# Patient Record
Sex: FEMALE | Race: WHITE | NOT HISPANIC OR LATINO | Employment: PART TIME | URBAN - METROPOLITAN AREA
[De-identification: names, ages, dates, MRNs, and addresses within clinical notes are randomized per-mention and may not be internally consistent; named-entity substitution may affect disease eponyms.]

---

## 2018-11-19 ENCOUNTER — OFFICE VISIT (OUTPATIENT)
Dept: FAMILY MEDICINE | Facility: CLINIC | Age: 23
End: 2018-11-19
Payer: COMMERCIAL

## 2018-11-19 VITALS
OXYGEN SATURATION: 97 % | SYSTOLIC BLOOD PRESSURE: 100 MMHG | WEIGHT: 156 LBS | HEART RATE: 101 BPM | DIASTOLIC BLOOD PRESSURE: 66 MMHG | RESPIRATION RATE: 20 BRPM

## 2018-11-19 DIAGNOSIS — N89.8 VAGINAL DISCHARGE: ICD-10-CM

## 2018-11-19 DIAGNOSIS — Z32.01 PREGNANCY TEST POSITIVE: Primary | ICD-10-CM

## 2018-11-19 DIAGNOSIS — Z13.9 SCREENING FOR CONDITION: ICD-10-CM

## 2018-11-19 DIAGNOSIS — Z00.00 PREVENTATIVE HEALTH CARE: ICD-10-CM

## 2018-11-19 PROBLEM — F32.5 MAJOR DEPRESSION IN REMISSION (H): Status: ACTIVE | Noted: 2018-03-14

## 2018-11-19 LAB
BACTERIA: NORMAL
CLUE CELLS: NORMAL
HCG UR QL: POSITIVE
MOTILE TRICHOMONAS: NEGATIVE
ODOR: NORMAL
PH WET PREP: <4.5
WBC WET PREP: NORMAL
YEAST: NORMAL

## 2018-11-19 RX ORDER — BUPROPION HYDROCHLORIDE 150 MG/1
150 TABLET ORAL
COMMUNITY
Start: 2018-11-16 | End: 2018-11-29

## 2018-11-19 RX ORDER — FLUTICASONE PROPIONATE 50 MCG
2 SPRAY, SUSPENSION (ML) NASAL
COMMUNITY
Start: 2018-02-05 | End: 2018-11-29

## 2018-11-19 RX ORDER — ONDANSETRON 4 MG/1
TABLET, ORALLY DISINTEGRATING ORAL
COMMUNITY
Start: 2015-11-01 | End: 2018-11-29

## 2018-11-19 RX ORDER — ALBUTEROL SULFATE 90 UG/1
2 AEROSOL, METERED RESPIRATORY (INHALATION)
COMMUNITY
End: 2018-11-29

## 2018-11-19 RX ORDER — MULTIVIT-MIN/FOLIC ACID/BIOTIN 66.7 MCG
TABLET ORAL
COMMUNITY
Start: 2016-02-29 | End: 2018-11-29

## 2018-11-19 RX ORDER — DIPHENOXYLATE HYDROCHLORIDE AND ATROPINE SULFATE 2.5; .025 MG/1; MG/1
1 TABLET ORAL
COMMUNITY
End: 2018-11-29

## 2018-11-19 RX ORDER — ALBUTEROL SULFATE 90 UG/1
AEROSOL, METERED RESPIRATORY (INHALATION)
COMMUNITY
End: 2018-11-29

## 2018-11-19 RX ORDER — LEVONORGESTREL AND ETHINYL ESTRADIOL 0.15-0.03
1 KIT ORAL
COMMUNITY
Start: 2018-06-25 | End: 2018-11-19

## 2018-11-19 ASSESSMENT — ENCOUNTER SYMPTOMS
EYES NEGATIVE: 1
NAUSEA: 0
VOMITING: 0
ROS GI COMMENTS: POSITIVE FOR HEARTBURN
BACK PAIN: 1
NERVOUS/ANXIOUS: 0
DYSPHORIC MOOD: 0
HEMATURIA: 0
ACTIVITY CHANGE: 0
HEADACHES: 1
DYSURIA: 0
NUMBNESS: 0
SHORTNESS OF BREATH: 0

## 2018-11-19 NOTE — PROGRESS NOTES
Unknown LMP, Aug or Sept  No BCP for 3 months due to filling issues, wrong doc  Usually beginning or end of month  Last BCP in august, no other forms of BCP  Lots of milky discharge for 3 weeks, no itchiness or discomfort  Pain after intercourse  Slight cramping 1 week, no nausea or vomiting  Itchy everywhere  Lower back pain, slight  Quit smoking now, would smoke occasionally with drinks  Alcohol week before last, 1/2 bottle wine  Cocaine in august, quit, not fun, has dabbled in drugs, now sober except mariuana, former opioids  Slight tension headaches recently  Breast tenderness 2 weeks  Heartburn with spicy food   Plant-based diet for 2 years, recently ate cheese, occasional eggs, no other recent changes  Pain in bilateral anterior hips  Weight , every day; running, cardio  US soon  Anxiety and depression , well-controlled now, on citalopram, hasn't used wellbutrin  Exercise asthma, not using albuterol  Not on seroquel  Dad high blood pressure  Takes iron supplements, used to have heavy periods, not on BCP, takes B12 too  On prenatal vitamin

## 2018-11-19 NOTE — PROGRESS NOTES
HPI       Blaire Owusu is a 23 year old  who presents for   Chief Complaint   Patient presents with     Pregnancy Test       Pregnancy Test/Confirmation      Blaire is a 23 year old Woman, .  with a significant past medical history of depression, anxiety, and exercise-induced asthma (well-controlled), who presents requesting a pregnancy test for confirmation of pregnancy and establishment of prenatal care.   Unknown LMP, August or 2018 after running out of OCP.Previous normal periods: No, irregular, had heavy periods, was on combined estrogen-progesterone OCPs which made periods lighter, ran out of OCPs in August and has not been on contraception since, had a couple weeks of bleeding after stopping and no vaginal bleeding since that time  Last unprotected intercourse on unknown - has been having unprotected intercourse since running out of OCPs in August  Contraceptive method : none  Symptoms of pregnancy: breast tenderness (2 weeks), mild suprapubic cramping (1 week), occasional mild headaches, heartburn (especially with spicy foods); no nausea, vomiting  Any Bleeding since LMP? no    If pregnant, pregnancy is Unplanned, Desired    Did smoke occasionally, usually while drinking alcohol, quit a few weeks ago. No cravings.   Last alcoholic drink week before last, 1/2 bottle wine, none since finding out she is pregnant.  History of opioid abuse, marijuana use, and cocaine use. Has not used cocaine or marijuana for a few months. No opioids for >2 years.     Has been on a plant-based diet for 2 years, no recent diet changes.    Exercises daily - lifts weights, cardio with running and jumping exercises    Takes iron and B12 supplements     On prenatal vitamin since finding out about pregnancy on     +++++++  Vaginal Symptoms  Onset: 3 weeks ago    Description:  Vaginal Discharge: white, milky   Itching (Pruritis): no  Burning sensation: no  Odor: not asked    Accompanying Signs & Symptoms:  Pain  with Urination:no  Abdominal Pain: Yes Details: mild suprapubic cramping  Fever: no  Pain after intercourse, no vaginal bleeding    History:   Sexually active: Yes  New Partner: unknown  Possibility of Pregnancy:  Yes, currently pregnant    Precipitating factors:   Recent Antibiotic Use: no     Alleviating factors:  none  Therapies Tried and outcome: none    Problem, Medication and Allergy Lists were      Patient Active Problem List    Diagnosis Date Noted     MILES (generalized anxiety disorder) 03/14/2018     Priority: Medium     Major depression in remission (H) 03/14/2018     Priority: Medium     Mixed anxiety depressive disorder 06/05/2014     Priority: Medium     Deliberate self-cutting 04/23/2012     Priority: Medium     Overview:   Long standing issue with multiple inpatient treatments.        Polysubstance abuse (H) 04/23/2012     Priority: Medium     Menorrhagia 09/02/2011     Priority: Medium     Overview:   On OCPs.       Oppositional defiant disorder 03/03/2011     Priority: Medium     Parent/child conflict 03/02/2011     Priority: Medium     ADHD (attention deficit hyperactivity disorder) 11/05/2010     Priority: Medium     Depressive disorder 11/05/2010     Priority: Medium         Current Outpatient Prescriptions   Medication Sig Dispense Refill     citalopram (CELEXA) 20 MG tablet Take 20 mg by mouth daily.       albuterol (PROAIR HFA/PROVENTIL HFA/VENTOLIN HFA) 108 (90 Base) MCG/ACT inhaler Inhale 2 puffs into the lungs       albuterol (PROVENTIL HFA) 108 (90 Base) MCG/ACT inhaler Inhale 2 Puffs by mouth 4 times daily if needed.    Indications: BRONCHOSPASM PREVENTION       buPROPion (WELLBUTRIN XL) 150 MG 24 hr tablet Take 150 mg by mouth       cyanocobalamin (VITAMIN  B-12) 100 MCG TABS tablet        fluticasone (FLONASE) 50 MCG/ACT spray 2 sprays       Margi, Zingiber officinalis, 500 MG CAPS        Margi, Zingiber officinalis, 500 MG CAPS Take  by mouth.       MARGI, ZINGIBER OFFICINALIS, PO         melatonin 3 MG tablet Take 3 mg by mouth nightly as needed.       Multiple Vitamin (MULTI-VITAMINS) TABS Take 1 tablet by mouth       Multiple Vitamin (MULTIVITAMINS PO)        Multiple Vitamins-Minerals (HAIR/SKIN/NAILS) TABS Take  by mouth.       Multiple Vitamins-Minerals (MULTI VITAMIN/MINERALS PO) Take  by mouth once daily.       ondansetron (ZOFRAN-ODT) 4 MG ODT tab Place 1 tablet on the tongue every 8 hours if needed for Nausea/Vomiting.           Allergies   Allergen Reactions     Lactose GI Disturbance   .    Patient is a new patient to this clinic and so  I reviewed/updated the Past Medical History, the Family History and the Social History .         Review of Systems:   Review of Systems   Constitutional: Negative for activity change.        Positive for diffuse itchiness   HENT: Negative.    Eyes: Negative.    Respiratory: Negative for shortness of breath.    Cardiovascular: Negative for chest pain.   Gastrointestinal: Negative for nausea and vomiting.        Positive for heartburn   Endocrine: Negative for cold intolerance and heat intolerance.   Genitourinary: Positive for vaginal discharge. Negative for dysuria, hematuria, pelvic pain and vaginal bleeding.   Musculoskeletal: Positive for back pain.        Positive for bilateral anterior hip pain bilaterally   Skin: Negative.    Neurological: Positive for headaches (occaional mild). Negative for numbness.   Psychiatric/Behavioral: Negative for dysphoric mood. The patient is not nervous/anxious.           Physical Exam:     Vitals:    11/19/18 1411   BP: 100/66   Pulse: 101   Resp: 20   SpO2: 97%   Weight: 156 lb (70.8 kg)     There is no height or weight on file to calculate BMI.  Vitals were reviewed and were normal     Physical Exam  General: well-appearing, no acute distress  HEENT: normocephalic, atraumatic  Respiratory: lungs clear to auscultation bilaterally, no wheezes, rhonchi, or rales  Cardiac: regular rate and rhythm, S1/S2 heard, no  murmurs, rubs, or gallops  : external genitalia appear normal, vaginal vault with large amount of creamy white discharge, no odor, normal-appearing external cervical os, mild bleeding from cervical os with Pap brush application  Musculoskeletal: no obvious deformities  Skin: no rashes on exposed skin areas. There are visible scars on medial thighs from history of cutting  Neurologic: cranial nerves grossly intact, moves all extremities equally, no obvious focal deficits  Psychiatric: pleasant, appropriate mood and affect      Results:      Results from this visit  Results for orders placed or performed in visit on 11/19/18   HCG Qualitative Urine (UPT) ([facility])   Result Value Ref Range    HCG Qual Urine POSITIVE Negative   Wet Prep ([facility])   Result Value Ref Range    Yeast Wet Prep None none    Motile Trichomonas Wet Prep Negative Negative    Clue Cells Wet Prep None NONE    WBC WET PREP 5-10 2 - 5    Bacteria Wet Prep Moderate None    pH Wet Prep <4.5 3.8 - 4.5    Odor Wet Prep None NONE       Assessment and Plan     Blaire was seen today for pregnancy test.    Diagnoses and all orders for this visit:    Screening for condition  -     HCG Qualitative Urine (UPT) ([facility]) - positive    Pregnancy test positive  Pregnancy was unplanned but is desired. Unknown LMP and has been having unprotected sex since she ran out of OCPs in 8/2018, so unable to estimate gestational age. Has stopped all substance use, including alcohol, tobacco, marijuana, and cocaine, since learning of pregnancy (or in the few months before). She started a prenatal vitamin after learning of her pregnancy on 11/16/18. Pregnancy would be considered high risk in setting of h/o polysubstance abuse, MDD and anxiety and h/o cutting and suicide attempts.   -     US OB <14 WKS SINGLE OR FIRST GESTATION (IN CLINIC); Future  -     Pap imaged thin layer screen only - recommended age 21 - 24 years  -  Advised patient on exercising - may continue regular  "exercise though suggested she lift no more than 50 lbs, continue cardio exercises as tolerated  -  Continue prenatal vitamin, bring bottle to first OB visit to ensure she is getting the right nutrients from the supplement  -  Schedule first OB visit - patient wants to [facility] receiving [facility]  -  Has already received a flu shot this year on 11/16/2018  -  At first OB visit, discuss further patient's social supports including info about father of the baby    Depression  Anxiety  Patient had been on citalopram 20mg daily. It was recently refilled on 11/16 when she was seen at [facility]. She was also given bropoprion at that time to help with symptoms of low motivation. Patient has not taken the bupropion and also stopped her citalopram when she discovered she was pregnant 3 days ago. She believes her symptoms were well-controlled on citalopram except for the motivation, though she is continuing to go to class and work and be productive without bupropion. Discussed with patient increased risk of taking bupropion in the setting of listed history of eating disorder. She says she never had an eating disorder but was thought to have one because she described herself being a \"picky eater.\"   Patient has an extensive mental health history including self-mutilating behavior with cutting, h/o suicide attempts at the age of 14y and 18y. She is established with a therapist who she sees on an as-needed basis. There are risks to the mother and fetus posed by untreated depression, such as suicidal behavior; anorexia leading to poor nutrition and poor weight gain or to weight loss; and cognitive impairment, anhedonia, or anergia leading to poor self-care as well as nonadherence with prenatal care. Complications may arise, including psychotic features, catatonia, and comorbid substance use disorders (eg, alcohol and tobacco). Citalopram is a category C medication for pregnancy risk " factor. SSRIs are not associated with specific patterns of birth defects across studies, which is reassuring in so far as teratogenicity is usually established by a consistent risk and pattern of malformations. Risks and benefits of citalopram during pregnancy were discussed with patient and she decided to re-start citalopram (which was stopped 3 days ago). Patient's mental health remains well controlled when on medication.   -  Okay to resume citalopram for depression/anxiety  -  Hold off on bupropion for now, may start this later if she feels she needs better control of her psychiatric symptoms vs consider another agent given possible history of eating disorder  -  Reconnect with established therapist if patient feels she needs extra support for her depression and anxiety during pregnancy    Vaginal discharge  Patient has had a large amount of milky white discharge for about 3 weeks without itchiness or vaginal pain. She does note some pain following intercourse. No vaginal bleeding. She has been having unprotected sex for the past 3 months. Differential includes vaginal yeast infection vs bacterial vaginosis vs Gonorrhea/Chlamydia infection vs Trichomonal infection. No itchiness and discharge has not been chunky, making yeast infection less likely. No odor to discharge suggestive of bacterial vaginosis, though this may be an atypical presentation of a bacterial infection. Gonorrhea/Chlamydia or Trichomonas are also likely given recent unprotected sex. Unknown if she has had any new partners recently, which would make STIs even more likely.  Yeast swab showed no yeast. Wet prep revealed normal pH, no motile Trichomonads, no clue cells, no odor, and no yeast. Moderate bacteria, but this is likely normal vaginal tejinder. Therefore, discharge is likely normal in the setting of pregnancy, which has likely increased the amount of discharge from her baseline. Will follow up results of gonorrhea and Chlamydia testing.  -      Neisseria gonorrhoeae PCR  -     Chlamydia trachomatis PCR  -     Wet Prep ([facility])  -  No treatment indicated at this point as wet prep and yeast swab are negative for infectious causes    Preventative health care  Patient has not had a Pap smear in the past. Since a speculum exam was being performed, a Pap smear was obtained for routine screening purposes.  -     Pap imaged thin layer screen only - recommended age 21 - 24 years         Medications Discontinued During This Encounter   Medication Reason     quetiapine (SEROQUEL) 100 MG tablet Stopped by Patient     levonorgestrel-ethinyl estradiol (EDINSON-28) 0.15-30 MG-MCG per tablet Stopped by Patient       Options for treatment and follow-up care were reviewed with the patient. Blaire Owusu  engaged in the decision making process and verbalized understanding of the options discussed and agreed with the final plan.    Michelle Espinosa, MS3  [employer]    I was present with the medical student who participated in the service and in the documentation of this note. I have verified the history and personally performed the physical exam and medical decision making, and have verified the content of the note, which accurately reflects my assessment of the patient and the plan of care.  Jena Armenta MD  Family Medicine PGY3 Resident

## 2018-11-19 NOTE — PROGRESS NOTES
Preceptor Attestation:   Patient seen, evaluated and discussed with the resident. I have verified the content of the note, which accurately reflects my assessment of the patient and the plan of care.   Supervising Physician:  Rosemarie Mejia MD

## 2018-11-19 NOTE — LETTER
November 20, 2018      Blaire Owusu  [address] [address]        Dear Blaire,    Thank you for getting your care at [facility]. Please see below for your test results. You tested negative for gonorrhea, chlamydia, bacterial vaginosis, yeast infection, or trichomonas. We are still waiting for your pap smear results.     Resulted Orders   HCG Qualitative Urine (UPT) (\[facility]\"")   Result Value Ref Range    HCG Qual Urine POSITIVE Negative   Neisseria gonorrhoeae PCR   Result Value Ref Range    Specimen Descrip Cervical     N Gonorrhea PCR Negative NEG^Negative      Comment:      Negative for N. gonorrhoeae rRNA by transcription mediated amplification.  A negative result by transcription mediated amplification does not preclude   the presence of N. gonorrhoeae infection because results are dependent on   proper and adequate collection, absence of inhibitors, and sufficient rRNA to   be detected.     Chlamydia trachomatis PCR   Result Value Ref Range    Specimen Description Cervical     Chlamydia Trachomatis PCR Negative NEG^Negative      Comment:      Negative for C. trachomatis rRNA by transcription mediated amplification.  A negative result by transcription mediated amplification does not preclude   the presence of C. trachomatis infection because results are dependent on   proper and adequate collection, absence of inhibitors, and sufficient rRNA to   be detected.     Wet Prep ([facility])   Result Value Ref Range    Yeast Wet Prep None none    Motile Trichomonas Wet Prep Negative Negative    Clue Cells Wet Prep None NONE    WBC WET PREP 5-10 2 - 5    Bacteria Wet Prep Moderate None    pH Wet Prep <4.5 3.8 - 4.5    Odor Wet Prep None NONE         Sincerely,    Jena Armenta MD

## 2018-11-19 NOTE — MR AVS SNAPSHOT
After Visit Summary   11/19/2018    Blaire Owusu    MRN: [id number]           Patient Information     Date Of Birth          1995        Visit Information        Provider Department      11/19/2018 2:00 PM Maria L Armenta MD [facility]        Today's Diagnoses     Pregnancy test positive    -  1    Screening for condition        Vaginal discharge        Preventative health care           Follow-ups after your visit        Your next 10 appointments already scheduled     Nov 28, 2018  1:40 PM CST   US OB < 14 WEEKS SINGLE with SYUS1   [facility] ([facility])    [address]  [address] [place] [id number]   [phone]           How do I prepare for my exam? (Food and drink instructions) Drink four 8-ounce glasses of fluid. Finish drinking an hour before your exam, so you have a full bladder. If you need to empty your bladder before your exam, try to release only a little urine. Then, drink another glass of fluid.  How do I prepare for my exam? (Other instructions) You may have up to two family members in the exam room. If you bring a small child, an adult must be there to care for him or her. No video or camera photography during the procedure.  What should I wear: Wear comfortable clothes.  How long does the exam take: Most ultrasounds take 30 to 60 minutes.  What should I bring: Bring a list of your medicines, including vitamins, minerals and over-the-counter drugs. It is safest to leave personal items at home.  Do I need a :  No  is needed.  What do I need to tell my doctor: Tell your doctor about any allergies you may have.  What should I do after the exam: No restrictions, you may resume normal activities.  What is this test: An ultrasound uses sound waves to make pictures of the body. Sound waves do not cause pain. The only discomfort may be the pressure of the wand against your skin or full bladder.  Who should I call with  questions: If you have any questions, please call the Imaging Department where you will have your exam. Directions, parking instructions, and other information are available on our website, [email or web address].              Future tests that were ordered for you today     Open Future Orders        Priority Expected Expires Ordered    US OB <14 WKS SINGLE OR FIRST GESTATION (IN CLINIC) Routine 11/19/2018 11/19/2019 11/19/2018            Who to contact     Please call your clinic at [phone] to:    Ask questions about your health    Make or cancel appointments    Discuss your medicines    Learn about your test results    Speak to your doctor            Additional Information About Your Visit        Care EveryWhere ID     This is your Care EveryWhere ID. This could be used by other organizations to access your [facility] [place] medical records  [id number]        Your Vitals Were     Pulse Respirations Pulse Oximetry             101 20 97%          Blood Pressure from Last 3 Encounters:   11/19/18 100/66   04/02/11 106/70    Weight from Last 3 Encounters:   11/19/18 156 lb (70.8 kg)   04/01/11 127 lb (57.6 kg) (71 %)*     * Growth percentiles are based on CDC 2-20 Years data.              We Performed the Following     Chlamydia trachomatis PCR     HCG Qualitative Urine (UPT) ([facility])     Neisseria gonorrhoeae PCR     Pap imaged thin layer screen only - recommended age 21 - 24 years     Wet Prep ([facility])        Primary Care Provider Fax #    [facility] [phone]       [facility] [id number]        Equal Access to Services     AMBAR YAÑEZ : Hadii dino lux hadariano Sojanene, waaxda luqadaha, qaybta kaalmada litzyda, yann sanchez. So [facility] [phone].    ATENCIÓN: Si habla español, tiene a perez disposición servicios gratuitos de asistencia lingüística. Llame al [phone].    We comply with applicable federal civil rights laws and [place] laws. We do not  discriminate on the basis of race, color, national origin, age, disability, sex, sexual orientation, or gender identity.            Thank you!     Thank you for choosing [facility]  for your care. Our goal is always to provide you with excellent care. Hearing back from our patients is one way we can continue to improve our services. Please take a few minutes to complete the written survey that you may receive in the mail after your visit with us. Thank you!             Your Updated Medication List - Protect others around you: Learn how to safely use, store and throw away your medicines at www.disposemymeds.org.          This list is accurate as of 11/19/18 11:59 PM.  Always use your most recent med list.                   Brand Name Dispense Instructions for use Diagnosis    * albuterol 108 (90 Base) MCG/ACT inhaler    PROAIR HFA/PROVENTIL HFA/VENTOLIN HFA     Inhale 2 puffs into the lungs        * PROVENTIL  (90 Base) MCG/ACT inhaler   Generic drug:  albuterol      Inhale 2 Puffs by mouth 4 times daily if needed.    Indications: BRONCHOSPASM PREVENTION        buPROPion 150 MG 24 hr tablet    WELLBUTRIN XL     Take 150 mg by mouth        citalopram 20 MG tablet    celeXA     Take 20 mg by mouth daily.        cyanocobalamin 100 MCG Tabs tablet    vitamin  B-12          fluticasone 50 MCG/ACT spray    FLONASE     2 sprays        * Ginger (Zingiber officinalis) 500 MG Caps      Take  by mouth.        * Ginger (Zingiber officinalis) 500 MG Caps           * GINGER (ZINGIBER OFFICINALIS) PO           * MULTI VITAMIN/MINERALS PO      Take  by mouth once daily.        * HAIR/SKIN/NAILS Tabs      Take  by mouth.        melatonin 3 MG tablet      Take 3 mg by mouth nightly as needed.        MULTI-VITAMINS Tabs      Take 1 tablet by mouth        MULTIVITAMINS PO           ondansetron 4 MG ODT tab    ZOFRAN-ODT     Place 1 tablet on the tongue every 8 hours if needed for Nausea/Vomiting.        *  Notice:  This list has 7 medication(s) that are the same as other medications prescribed for you. Read the directions carefully, and ask your doctor or other care provider to review them with you.

## 2018-11-19 NOTE — LETTER
November 21, 2018      Blaire Owusu  [address] [facility] [address]        Dear Blaire,    Thank you for getting your care at [facility]. Please see below for your test results. Your pap smear is normal so your next one will be in 3 years.     Resulted Orders   HCG Qualitative Urine (UPT) ([facility])   Result Value Ref Range    HCG Qual Urine POSITIVE Negative   Neisseria gonorrhoeae PCR   Result Value Ref Range    Specimen Descrip Cervical     N Gonorrhea PCR Negative NEG^Negative      Comment:      Negative for N. gonorrhoeae rRNA by transcription mediated amplification.  A negative result by transcription mediated amplification does not preclude   the presence of N. gonorrhoeae infection because results are dependent on   proper and adequate collection, absence of inhibitors, and sufficient rRNA to   be detected.     Chlamydia trachomatis PCR   Result Value Ref Range    Specimen Description Cervical     Chlamydia Trachomatis PCR Negative NEG^Negative      Comment:      Negative for C. trachomatis rRNA by transcription mediated amplification.  A negative result by transcription mediated amplification does not preclude   the presence of C. trachomatis infection because results are dependent on   proper and adequate collection, absence of inhibitors, and sufficient rRNA to   be detected.     Wet Prep ([facility])   Result Value Ref Range    Yeast Wet Prep None none    Motile Trichomonas Wet Prep Negative Negative    Clue Cells Wet Prep None NONE    WBC WET PREP 5-10 2 - 5    Bacteria Wet Prep Moderate None    pH Wet Prep <4.5 3.8 - 4.5    Odor Wet Prep None NONE   Pap imaged thin layer screen only - recommended age 21 - 24 years   Result Value Ref Range    PAP NIL     Copath Report         Acc#: [id number]   Signed: 11/21/2018 09:01   MR#: [id number]    SPECIMEN/STAIN PROCESS:  Pap imaged thin layer prep screening (Surepath, FocalPoint with guided   screening)       Pap-Cyto x 1    SOURCE: Cervical,  endocervical  ----------------------------------------------------------------   Pap imaged thin layer prep screening (Surepath, FocalPoint with guided   screening)  SPECIMEN ADEQUACY:  Satisfactory for evaluation.  -Transformation zone component present.    CYTOLOGIC INTERPRETATION:    Negative for intraepithelial lesion or malignancy    Electronically signed by:  DOM Gale (ASCP)    CLINICAL HISTORY:    Pregnant,    Papanicolaou Test Limitations:  Cervical cytology is a screening test with   limited sensitivity; regular  screening is critical for cancer prevention; Pap tests are primarily   effective for the diagnosis/prevention of  squamous cell carcinoma, not adenocarcinomas or other cancers.  TESTING LAB LOCATION:  [facility] [address] [address], [phone]  Processed and screened at [facility],   [facility]             Sincerely,    Jena Armenta MD

## 2018-11-20 ENCOUNTER — TELEPHONE (OUTPATIENT)
Dept: FAMILY MEDICINE | Facility: CLINIC | Age: 23
End: 2018-11-20

## 2018-11-20 LAB
C TRACH DNA SPEC QL NAA+PROBE: NEGATIVE
N GONORRHOEA DNA SPEC QL NAA+PROBE: NEGATIVE
SPECIMEN SOURCE: NORMAL
SPECIMEN SOURCE: NORMAL

## 2018-11-20 NOTE — TELEPHONE ENCOUNTER
Confirmation of pregnancy visit: 11/19/2018    LMP: Unknown August or September  Gestational Age: Unknown, need US  Estimated Date of Delivery: Unknown, need US    Dating US has been ordered. Please call patient to schedule ASAP. GA is unknown per Dr. Armenta (8 weeks gestation).     NOB may be scheduled after US is completed. May be scheduled same day, but US must be completed first.    When calling to schedule NOB, please give scheduling preference to the following providers. If ok with male provider, please offer only male provider appointments (unless no availability for >2 weeks)    NOBs should NOT be scheduled or rescheduled with Zoran Armenta or Jeff    Male:  1. Dr. Mcelroy  2. Dr. Ch    Female:  1. Dr. Tierney  2. Dr. Desai    Other Notes:  GA unknown. US ordered by Dr. Armenta. OB provider schedule is not out yet.     Please document call and close encounter.    Viky Pierre RN

## 2018-11-21 LAB
COPATH REPORT: NORMAL
PAP: NORMAL

## 2018-11-21 NOTE — TELEPHONE ENCOUNTER
Inform patient: You will see two providers throughout your prenatal care. One of them may be a male, will that be a problem for you? No    Additional patient comments: none    Dating US scheduled: 11/28/18 at 1:40pm    NOB scheduled 11/28/18 at 2:20pm with Dr Mcelroy (this will be their primary OB provider).

## 2018-11-28 ENCOUNTER — RADIANT APPOINTMENT (OUTPATIENT)
Dept: ULTRASOUND IMAGING | Facility: CLINIC | Age: 23
End: 2018-11-28
Payer: COMMERCIAL

## 2018-11-28 DIAGNOSIS — Z32.01 PREGNANCY TEST POSITIVE: ICD-10-CM

## 2018-11-28 NOTE — TELEPHONE ENCOUNTER
Called patient to reschedule NOB appointment due to bump. Rescheduled to see Dr Tierney at 3:40pm on 11/29/18.

## 2018-11-29 ENCOUNTER — OFFICE VISIT (OUTPATIENT)
Dept: FAMILY MEDICINE | Facility: CLINIC | Age: 23
End: 2018-11-29
Payer: COMMERCIAL

## 2018-11-29 VITALS
TEMPERATURE: 98.4 F | HEART RATE: 89 BPM | SYSTOLIC BLOOD PRESSURE: 106 MMHG | DIASTOLIC BLOOD PRESSURE: 70 MMHG | WEIGHT: 155.4 LBS | OXYGEN SATURATION: 97 % | RESPIRATION RATE: 16 BRPM

## 2018-11-29 DIAGNOSIS — O09.90 SUPERVISION OF HIGH RISK PREGNANCY, ANTEPARTUM: ICD-10-CM

## 2018-11-29 DIAGNOSIS — O09.891: ICD-10-CM

## 2018-11-29 DIAGNOSIS — F12.10 CANNABIS ABUSE, EPISODIC: ICD-10-CM

## 2018-11-29 DIAGNOSIS — O09.91 HIGH-RISK PREGNANCY IN FIRST TRIMESTER: Primary | ICD-10-CM

## 2018-11-29 LAB
ABO + RH BLD: NORMAL
ABO + RH BLD: NORMAL
AMPHETAMINES QUAL: NEGATIVE
BARBITURATES QUAL URINE: NEGATIVE
BENZODIAZEPINE QUAL URINE: NEGATIVE
BILIRUBIN UR: NEGATIVE
BLD GP AB SCN SERPL QL: NORMAL
BLOOD BANK CMNT PATIENT-IMP: NORMAL
BLOOD UR: NEGATIVE
BUPRENORPHINE QUAL URINE: NEGATIVE
CANNABINOIDS UR QL SCN: POSITIVE
COCAINE QUAL URINE: NEGATIVE
GLUCOSE URINE: NEGATIVE
HEMOGLOBIN: 12.5 G/DL (ref 11.7–15.7)
KETONES UR QL: NEGATIVE
LEUKOCYTE ESTERASE UR: NEGATIVE
METHAMPHETAMINE: NEGATIVE
METHODONE QUAL: NEGATIVE
MORPHINE QUAL: NEGATIVE
NITRITE UR QL STRIP: NEGATIVE
OXYCODONE QUAL: NEGATIVE
PH UR STRIP: 6.5 [PH] (ref 5–7)
PHENCYCLIDINE: NEGATIVE
PROPOXYPHENE: NEGATIVE
PROTEIN UR: NEGATIVE
SP GR UR STRIP: 1.02
SPECIMEN EXP DATE BLD: NORMAL
TEMPERATURE OF URINE WAS BETWEEN 90-100 DEGREES F: YES
TRICYCLIC ANTIDEPRESSANTS: NEGATIVE
UROBILINOGEN UR STRIP-ACNC: NORMAL

## 2018-11-29 RX ORDER — PRENATAL VIT/IRON FUM/FOLIC AC 27MG-0.8MG
1 TABLET ORAL DAILY
COMMUNITY

## 2018-11-29 NOTE — PROGRESS NOTES
First Obstetric Visit       HPI       Blaire Owusu is a 23 year old woman who presents for an initial prenatal visit at Unknown weeks of pregnancy with LIEN of 2019 by LMP of Patient's last menstrual period was 2018 (approximate)..      She has not had bleeding since her LMP.   She has had mild nausea.   Weight loss has occurred, for a total of 1 pounds.    This was not a planned pregnancy.     OTHER CONCERNS: none     Labor Risk Assessment   Is the patient's age <18 or >40?     No  Patint's BMI is 23  Does patient have a BMI < 18.5?     No  Prior delivery within 6 months?      No  Ever delivered prior to 37 weeks gestation?  No  Pregnancy occur via In Vitro Fertilization?   No  Are you carrying twins?       No    The patient has the following additional risk factors for  labor:   Q13,15,17: History of Substance Abuse  Q14,16; Substance use this pregnancy  Q19: History of Depression, bi-polar, anxiety, schizophrenia   Q20: Depression, bi-polar, anxiety, schizophrenia this pregnancy       Labor Risk Summary:  Pt is high risk for  Labor  Yes     Past Medical History  Past Medical History:   Diagnosis Date     Anxiety      Depression      Eating disorder Bulemia     Mutilations, self     Cutting     Patient reports remote history of cutting, most recently 7 years ago. Had prior history of bulmia. Denies concerns today.     Do you have a history of any of the following medical conditions?    Condition Yes/No   Hypertension No   Heart disease, mitral valve prolapse, rheumatic fever No   Asthma or another chronic lung disease No   An autoimmune disorder No   Kidney disease No   Frequent urinary tract infections No   Migraine headaches No   Stroke, loss of sensation/function, seizures, or other neuro problem No   Diabetes No   Thyroid problems or have you taken thyroid medication No   Hepatitis, liver disease, jaundice No   Blood clots, phlebitis, pulmonary embolism or varicose  veins No   Excessive bleeding after surgery or dental work No   Heavy menstrual periods requiring treatment No   Anemia No   Blood transfusions No        Would you refuse a blood transfusion? No   Breast problems No   Abnormalities of the uterus No   Abnormal pap smear No   Have you been treated for an emotional disturbance? Yes - depression   Have you been physically, sexually, or emotionally hurt by someone? No   Have you been in a major accident or suffered serious trauma? No   Have you had surgical procedures? No        Have you ever had any complications from anesthesia? No    Have you ever been hospitalized for a nonsurgical reason? Yes - psychiatry/  mental health     Notes for positives   Restarted Citalopram recently as concerned that it could impact fetus. Educated that serotonin specific reuptake inhibitor per literature does not impact growing fetus. Restarted seeing therapist. Most recent hospitalization per chart review was in 2013.      Prenatal Genetic Screening    Do you have a history of any of the following Yes/No        A metabolic disorder (e.g. Insulin-dependent DM, PKU) No        Recurrent pregnancy loss No     Do you, the baby's father, or anyone in your families have Yes/No        Thalassemia AND MCV <80 No        Hemophilia No        Neural tube defect No        Congenital heart defect No        Sickle cell disease or trait No        Muscular dystrophy No        Cystic fibrosis No        Mental retardation or autism No        Down's syndrome No        Beck-Sach's disease No        Johanna's chorea No        Any other inherited genetic or chromosomal disorder No        A child with birth defects not listed above No     Infection History    At high risk for coming in contact with HIV No   Ever treated for tuberculosis No   Ever received the BCG vaccine for tuberculosis No   Ever had genital herpes (or has your partner) No   Had a rash or viral illness since LMP No   Ever had a sexually  transmitted infection Yes, chlamydia w/appropriate tx   Ever had chicken pox or the vaccine Yes   Ever had any other serious infectious disease No   Up to date with immunizations Yes       Habits  Have you ever used tobacco products (cigarettes, chewing tobacco)? No  Do you currently use tobacco products? No  Have you ever used alcohol? Some drinking at beginning of pregnancy, otherwise has stopped  Have you ever used any other drugs? Marijuana few uses at beginning of pregnancy, otherwise nothing since found out she was pregnant. Last cocaine use around Halloween time. Patient is agreeable to UDS.  Current medications are:  Current Outpatient Prescriptions   Medication Sig Dispense Refill     citalopram (CELEXA) 20 MG tablet Take 20 mg by mouth daily.       Prenatal Vit-Fe Fumarate-FA (PRENATAL MULTIVITAMIN W/IRON) 27-0.8 MG tablet Take 1 tablet by mouth daily         REVIEW OF SYSTEMS  ENT: NEGATIVE for ear, mouth and throat problems  CV: NEGATIVE for chest pain, palpitations or peripheral edema  GI: NEGATIVE for nausea, abdominal pain, heartburn, or change in bowel habits  : NEGATIVE for unusual urinary or vaginal symptoms.   C: NEGATIVE for fever, chills, change in weight  I: NEGATIVE for worrisome rashes, moles or lesions  E: NEGATIVE for vision changes or irritation  R: NEGATIVE for significant SOB; mild cough   B: NEGATIVE for masses or discharge; mild tenderness   M: NEGATIVE for significant arthralgias or myalgia  N: NEGATIVE for weakness, dizziness or paresthesias  P: NEGATIVE for changes in mood or affect  ====================================================    PHYSICAL EXAM:  /70  Pulse 89  Temp 98.4  F (36.9  C) (Oral)  Resp 16  Wt 155 lb 6.4 oz (70.5 kg)  LMP 09/24/2018 (Approximate)  SpO2 97%       GENERAL:  Pleasant pregnant female, alert, well groomed.   SKIN:  Warm and dry, without lesions or rashes. Multiple tattoos on visible skin appear well healed. Several healed self harm marks  on the inside of right wrist.   HEAD: Symmetrical features.  EYES:  EOMI.  NECK:  Thyroid without enlargement and nodules.  Lymph nodes not palpable.  LUNGS:  Clear to auscultation.  BREAST:  Deferred by patient  HEART:  RRR without murmur.  ABDOMEN: Soft without masses, tenderness or organomegaly. No scars noted.  MUSCULOSKELETAL:  Full range of motion  EXTREMITIES:  No edema. No significant varicosities.   GENITALIA: Deferred by patient  =========================================    ASSESSMENT & PLAN   1.High-risk pregnancy in first trimester   Will continue with routine prenatal cares including blood and urine testing. Provided with new OB  resource folder. Pap is UTD.   2. Cannabis abuse, episodic    Discussed with patient findings of UDS. Denies need for chemical treatment at this time. Highly  encouraged patient to abstain from usage during pregnancy as well as discussed risks of substance  use during pregnancy. Will need a repeat UDS at time of delivery.   3. Risk of  labor   Patient is considered high risk for  labor with mental health history along with polysubstance  use.     23 year old  7 weeks and 2 days of pregnancy with LIEN of 2019 by 1st trimester ultrasound.  Patient's last menstrual period was 2018 (approximate)..      Pregnancy Risk Assessment: High Risk pregnancy  Dating US ordered?   No  PNV/Folate ordered?  Yes and taking  Follow up:   1 month  Recommended weight gain:  15-25 lbs.    Instructed on best evidence for: weight gain for her BMI for pregnancy; healthy diet and foods to avoid; exercise and activity during pregnancy; avoiding exposure to toxoplasmosis; and maintenance of a generally healthy lifestyle.     Discussed the harms, benefits, side effects and alternative therapies for current prescribed and OTC medications.    Options for treatment and follow-up care were reviewed with the patient and/or guardian. Blaire Owusu and/or guardian engaged in the  decision making process and verbalized understanding of the options discussed and agreed with the final plan.    Margaret Tierney MD  [facility] Resident PGY-2  [phone]    Those present during this appointment were: patient and myself

## 2018-11-29 NOTE — MR AVS SNAPSHOT
After Visit Summary   2018    Blaire Owusu    MRN: [id number]           Patient Information     Date Of Birth          1995        Visit Information        Provider Department      2018 3:40 PM Margaret Tierney MD [facility]        Today's Diagnoses     High-risk pregnancy in first trimester    -  1    Cannabis abuse, episodic        Risk of  labor in first trimester           Follow-ups after your visit        Your next 10 appointments already scheduled     Dec 27, 2018  3:40 PM CST   RETURN OB with [facility] ([facility])     [address],  [address]  [place] [address]   [phone]              Who to contact     Please call your clinic at [phone] to:    Ask questions about your health    Make or cancel appointments    Discuss your medicines    Learn about your test results    Speak to your doctor            Additional Information About Your Visit        Care EveryWhere ID     This is your Care EveryWhere ID. This could be used by other organizations to access your [facility] medical records  [id number]        Your Vitals Were     Pulse Temperature Respirations Last Period Pulse Oximetry       89 98.4  F (36.9  C) (Oral) 16 2018 (Approximate) 97%        Blood Pressure from Last 3 Encounters:   18 106/70   18 100/66   11 106/70    Weight from Last 3 Encounters:   18 155 lb 6.4 oz (70.5 kg)   18 156 lb (70.8 kg)   11 127 lb (57.6 kg) (71 %)*     * Growth percentiles are based on CDC 2-20 Years data.              We Performed the Following     ABO/Rh type and screen     Chlamydia trachomatis PCR     Hemoglobin (HGB) ([facility])     Hepatitis B Surface Antibody     Hepatitis B surface antigen     HIV Antigen Antibody Combo     Neisseria gonorrhoeae PCR     Rapid Urine Drug Screen ([facility])     Rubella Antibody IgG Quantitative     Treponema Abs w Reflex  to RPR and Titer     Urinalysis([facility])     Urine Culture Aerobic Bacterial     Varicella Zoster Virus Antibody IgG          Today's Medication Changes          These changes are accurate as of 11/29/18 11:59 PM.  If you have any questions, ask your nurse or doctor.               Stop taking these medicines if you haven't already. Please contact your care team if you have questions.     albuterol 108 (90 Base) MCG/ACT inhaler   Commonly known as:  PROAIR HFA/PROVENTIL HFA/VENTOLIN HFA   Stopped by:  Margaret Tierney MD           buPROPion 150 MG 24 hr tablet   Commonly known as:  WELLBUTRIN XL   Stopped by:  Margaret Tierney MD           fluticasone 50 MCG/ACT nasal spray   Commonly known as:  FLONASE   Stopped by:  Margaret Tierney MD           Jeni (Zingiber officinalis) 500 MG Caps   Stopped by:  Margaret Tierney MD           JENI (ZINGIBER OFFICINALIS) PO   Stopped by:  Margaret Tierney MD           HAIR/SKIN/NAILS Tabs   Stopped by:  Margaret Tierney MD           melatonin 3 MG tablet   Stopped by:  Margaret Tierney MD           MULTI VITAMIN/MINERALS PO   Stopped by:  Margaret Tierney MD           MULTI-VITAMINS Tabs   Stopped by:  Margaret Tierney MD           MULTIVITAMINS PO   Stopped by:  Margaret Tierney MD           ondansetron 4 MG ODT tab   Commonly known as:  ZOFRAN-ODT   Stopped by:  Margaret Tierney MD           PROVENTIL  (90 Base) MCG/ACT inhaler   Generic drug:  albuterol   Stopped by:  Margaret Tierney MD           vitamin B-12 100 MCG tablet   Commonly known as:  CYANOCOBALAMIN   Stopped by:  Margaret Tierney MD                    Primary Care Provider Fax #    [facility] [phone]       [address] [place] [address]        Equal Access to Services     MAYRA YAÑEZ : Everette Castillo, jose daniel mccrary, qagiuseppe kayann fraga. [facility] [phone].    ATENCIÓN: Si annila español, tiene a perez disposición servicios  pietro de asistencia lingüística. Anastacia rodriguez [phone].    We comply with applicable federal civil rights laws and [place] laws. We do not discriminate on the basis of race, color, national origin, age, disability, sex, sexual orientation, or gender identity.            Thank you!     Thank you for choosing [facility]  for your care. Our goal is always to provide you with excellent care. Hearing back from our patients is one way we can continue to improve our services. Please take a few minutes to complete the written survey that you may receive in the mail after your visit with us. Thank you!             Your Updated Medication List - Protect others around you: Learn how to safely use, store and throw away your medicines at www.disposemymeds.org.          This list is accurate as of 11/29/18 11:59 PM.  Always use your most recent med list.                   Brand Name Dispense Instructions for use Diagnosis    citalopram 20 MG tablet    celeXA     Take 20 mg by mouth daily.        prenatal multivitamin w/iron 27-0.8 MG tablet      Take 1 tablet by mouth daily

## 2018-11-29 NOTE — PROGRESS NOTES
Preceptor Attestation:   Patient seen, evaluated and discussed with the resident. I have verified the content of the note, which accurately reflects my assessment of the patient and the plan of care.   Supervising Physician:  Tonia Villa MD

## 2018-11-30 LAB
BACTERIA SPEC CULT: NORMAL
BACTERIA SPEC CULT: NORMAL
C TRACH DNA SPEC QL NAA+PROBE: NEGATIVE
HBV SURFACE AB SERPL IA-ACNC: 61.81 M[IU]/ML
HBV SURFACE AG SERPL QL IA: NONREACTIVE
HIV 1+2 AB+HIV1 P24 AG SERPL QL IA: NONREACTIVE
Lab: NORMAL
N GONORRHOEA DNA SPEC QL NAA+PROBE: NEGATIVE
RUBV IGG SERPL IA-ACNC: 24 IU/ML
SPECIMEN SOURCE: NORMAL
T PALLIDUM AB SER QL: NONREACTIVE
VZV IGG SER QL IA: 2.2 AI (ref 0–0.8)

## 2018-12-27 ENCOUNTER — OFFICE VISIT (OUTPATIENT)
Dept: FAMILY MEDICINE | Facility: CLINIC | Age: 23
End: 2018-12-27
Payer: COMMERCIAL

## 2018-12-27 VITALS
HEART RATE: 94 BPM | OXYGEN SATURATION: 99 % | WEIGHT: 157.4 LBS | TEMPERATURE: 97.9 F | SYSTOLIC BLOOD PRESSURE: 94 MMHG | DIASTOLIC BLOOD PRESSURE: 68 MMHG

## 2018-12-27 DIAGNOSIS — O09.91 HIGH-RISK PREGNANCY IN FIRST TRIMESTER: Primary | ICD-10-CM

## 2018-12-27 NOTE — PROGRESS NOTES
Preceptor Attestation:  I discussed the patient with the resident. I have verified the content of the note, which accurately reflects my assessment of the patient and the plan of care.   Supervising Physician:  Dima Paez MD

## 2018-12-27 NOTE — PROGRESS NOTES
"Return OB visit  11-23 weeks    Subjective:   Blaire is a 23 year old  female at 11w2d who returns for prenatal care. LIEN 2019.  - Concerns today: None  - Patient reports no vaginal bleeding, no contractions/severe cramping, no leakage of fluid. Fetal movement is not present yet.    - Intermittent nausea/vomiting decline any intervention at this time. No heartburn.   - No vaginal discharge. No dysuria.   - No headache, vision changes, lower extremity swelling, upper abdominal pain, chest pain, shortness of breath.   - Mood has been \"every where.\"    Objective:   BP 94/68   Pulse 94   Temp 97.9  F (36.6  C) (Oral)   Wt 71.4 kg (157 lb 6.4 oz)   LMP 2018 (Approximate)   SpO2 99%    Const: No distress  Abd: Gravid.   See flowsheet for FH, FHTs, edema     Prenatal labs:   -   Hemoglobin   Date Value Ref Range Status   2018 12.5 11.7 - 15.7 g/dL Final       Assessment & plan:   IUP at 11w2d weeks by 1st trimester ultrasound not consistent with LMP.     - Prenatal labs reviewed: Rh+, hepatitis B, rubella, varicella immune; notable cannabinoids on UDS, already discussed with patient during last visit  - Pregnancy complicated by: +UDS in 1st trimester pregnancy and Hx of depression and anxiety currently on Celexa  - Patient does want to proceed with screening. Will obtain at next visit.   - Discussed screening for gestational diabetes at 24-28 weeks.  - Pregnancy weight gain has been 0.635 kg (1 lb 6.4 oz) to date, which is adequate.   - Provided counseling regarding  labor symptoms, depression, social support systems and breast feeding. The patient plans to deliver at [facility] with myself and/or OB partner.     Breastfeeding education provided:  - Benefits of Breastfeeding   - Patient will continue taking prenatal vitamins and avoiding cigarettes, substance use & alcohol.   - Return to clinic in 4 weeks.    - Specific concerns addressed today:   Depression, continues to use " Celexa. Will continue to monitor PHQ-9.     Options for treatment and follow-up care were reviewed with the patient and/or guardian. Blaire Owusu and/or guardian engaged in the decision making process and verbalized understanding of the options discussed and agreed with the final plan.    Margaret Tierney MD  [facility] Resident PGY-2  [phone]    Those present during this appointment were: patient, myself and patient's significant other

## 2019-01-28 ENCOUNTER — OFFICE VISIT (OUTPATIENT)
Dept: FAMILY MEDICINE | Facility: CLINIC | Age: 24
End: 2019-01-28
Payer: COMMERCIAL

## 2019-01-28 VITALS
SYSTOLIC BLOOD PRESSURE: 99 MMHG | TEMPERATURE: 98.8 F | WEIGHT: 161 LBS | DIASTOLIC BLOOD PRESSURE: 66 MMHG | HEART RATE: 110 BPM | OXYGEN SATURATION: 100 %

## 2019-01-28 DIAGNOSIS — N92.1 MENORRHAGIA WITH IRREGULAR CYCLE: ICD-10-CM

## 2019-01-28 DIAGNOSIS — O09.90 SUPERVISION OF HIGH RISK PREGNANCY, ANTEPARTUM: Primary | ICD-10-CM

## 2019-01-28 ASSESSMENT — PATIENT HEALTH QUESTIONNAIRE - PHQ9: SUM OF ALL RESPONSES TO PHQ QUESTIONS 1-9: 0

## 2019-01-28 NOTE — PROGRESS NOTES
"Return OB visit  12-23 weeks    Subjective:   Blaire is a 23 year old  female at 15w6d who returns for prenatal care. LIEN 2019.  - Concerns today: none  - Patient reports no vaginal bleeding, no contractions/severe cramping, no leakage of fluid. Fetal movement not appreciated yet.   - No nausea/vomiting. Mild heartburn, diet induced.   - No vaginal discharge. No dysuria.   - No headache, vision changes, lower extremity swelling, upper abdominal pain, chest pain, shortness of breath.   - Mood has been \"been good\".    Objective:   BP 99/66   Pulse 110   Temp 98.8  F (37.1  C) (Oral)   Wt 73 kg (161 lb)   LMP 2018 (Approximate)   SpO2 100%    Const: No distress  Card: RRR, no m/r/g  Resp: No resp distress, clear bilaterally   Abd: Gravid.   See flowsheet for FH, FHTs, edema     Prenatal labs:   -   Hemoglobin   Date Value Ref Range Status   2018 12.5 11.7 - 15.7 g/dL Final     Assessment & plan:   IUP at 15w6d weeks by 1st trimester ultrasound not consistent with LMP.     - Pregnancy complicated by: + UDS in 1st trimester pregnancy, Hx of depression and anxiety currently on Celexa  - Discussed quad screen. Patient does want to proceed with screening. Lab ordered today.   - Discussed and ordered ultrasound for fetal survey.   - Pregnancy weight gain has been 2.268 kg (5 lb) to date, which is adequate.   - Provided counseling regarding  labor symptoms, depression and social support systems, breast feeding, contraception options after delivery. The patient plans to deliver at [facility] with myself and/or OB partner.     Breastfeeding education provided:  - Cue Feeding  - Supply and Demand  - Exclusivity   - Good Latch  - Avoiding Artifical Nipples      - Patient will continue taking prenatal vitamins and avoiding cigarettes & alcohol.   - Return to clinic in 4 weeks.    - Specific concerns addressed today:   None    Options for treatment and follow-up care were reviewed with the " patient and/or guardian. Blaire Owusu and/or guardian engaged in the decision making process and verbalized understanding of the options discussed and agreed with the final plan.    Margaret Tierney MD  [facility] Resident PGY-2  [phone]    Those present during this appointment were: patient, myself, FOB, and MS3

## 2019-01-28 NOTE — LETTER
January 30, 2019      Blaire Owusu  [address] [facility] [id number]        Dear Blaire,    Thank you for getting your care at [facility]. Please see below for your test results.    Resulted Orders   Maternal Quad Marker 2nd Trimester   Result Value Ref Range    Pt Date of Birth 1995     Patient Weight 160     Weight Units Pounds       Comment:      CORRECTED ON 01/29 AT 1150: PREVIOUSLY REPORTED AS Low Blood Sugar    Due Date 07/16/2019       Comment:      CORRECTED ON 01/29 AT 1150: PREVIOUSLY REPORTED AS Ultrasound    Dating Method Ultrasound     Last Mens Period 09/24/2018     Number of Fetuses Ultrasound     Monochorionic Twins No       Comment:      CORRECTED ON 01/29 AT 1150: PREVIOUSLY REPORTED AS %NEG    Race of Mother MULTI (/CAUCASION)     Diabetic at Conception No       Comment:      CORRECTED ON 01/29 AT 1150: PREVIOUSLY REPORTED AS %NEG    Smoking Status No     Valproic/Carbamazepine Status YES/NO     Previous Trisomy Pregnancy No       Comment:      CORRECTED ON 01/29 AT 1150: PREVIOUSLY REPORTED AS Ultrasound    Fam History Of NTD No       Comment:      CORRECTED ON 01/29 AT 1150: PREVIOUSLY REPORTED AS %NEG    In Vitro Fertilization Egg Donor Age No       Comment:      CORRECTED ON 01/29 AT 1150: PREVIOUSLY REPORTED AS %NEG    Repeat Specimen No     Alpha Feto Protein 15 ng/mL    MoM for AFP 0.49     Estriol 0.68 ng/mL    MoM uE3 0.82     hCG 2nd Trimester 37,580 IU/L    MoM hCG 2nd Trimester 1.02     Dimeric Inhibin A 157 pg/mL    MoM Dimeric Inhibin A 0.94     AFP Interpretation Screen Neg       Comment:      (Note)  INTERPRETATION: SCREEN NEGATIVE                               Neural Tube Defects (NTD)   Negative       Down syndrome (DS)          Negative       Trisomy 18 (T18)            Negative                                                               Pre-Test     Post-Test      Cutoff                                      Neural Tube Defects Risks   1:52          < 1:1380     1:250           Down Syndrome Risks         1:1100       1:627        1:150           Trisomy 18 Risks            1:4280       < 1:02405    1:100                                        Comments:                                                  The risk of an open neural tube defect is less than the  screening cut-off.                                         The risk of Down syndrome is less than the screening  cut-off.                                                   The risk of trisomy 18 is less than the screening cut-off.                               Information provided indicates that the patient was expos  ed  to valproic acid during this pregnancy. Infants who have  been exposed to antiepileptic drugs (AEDs) in utero have an  increased risk of congenital malformations, including neural  tube defects (NTDs). Use of polytherapy with more than one  AED appears to be associated with a higher risk of birth  defects compared with monotherapy. Based on this  information, the risk of neural tube defect is increased 20  times. Genetic counseling is recommended.  Test developed and characteristics determined by [facility]. See Compliance Statement B: [employer]      Maternal Age at Delivery 23.1 yr    Patient Weight 160.0 lbs.     Estimated Due Date SEE NOTE       Comment:      (Note)  Results for Estimated Due Date: 07-16-19       Gestational Age Calculated 15 wks, 6 days     Dating Ultrasound     Num of Fetuses Unknown     Maternal Race Nonblack     Insulin Diabetic No     Smoking Status No     Fam History of NTD No     Family History of Aneuploidy No     Specimen Iteration SEE NOTE       Comment:      (Note)  Initial sample      Electronic Enhanced Report SEE NOTE       Comment:      (Note)  Access [employer] Enhanced Report using either link below:  -Direct access:   [email or web address]  -Enter Username, Password: [email or web address]  Username:  [email or web address]  Password: [email or web address]  Performed by [facility],  [address] [place] [address],[place] [address] [phone]  [email or web address], Thee Alarcon MD, Lab. Director         If you have any concerns about these results please call and leave a message for me or send a MyChart message to the clinic.    Sincerely,    Payal Morfin MD

## 2019-01-28 NOTE — PROGRESS NOTES
Preceptor Attestation:   Patient seen, evaluated and discussed with the resident. I have verified the content of the note, which accurately reflects my assessment of the patient and the plan of care.   Supervising Physician:  Brooks Villa MD

## 2019-01-30 LAB
# FETUSES US: NORMAL
# FETUSES: NORMAL
AFP ADJ MOM AMN: 0.49
AFP SERPL-MCNC: 15 NG/ML
AGE - REPORTED: 23.1 YR
CURRENT SMOKER: NO
CURRENT SMOKER: NO
DIABETES STATUS PATIENT: NO
FAMILY MEMBER DISEASES HX: NO
FAMILY MEMBER DISEASES HX: NO
GA METHOD: NORMAL
GA METHOD: NORMAL
GA: NORMAL WK
HCG MOM SERPL: 1.02
HCG SERPL-ACNC: NORMAL IU/L
HX OF HEREDITARY DISORDERS: NO
IDDM PATIENT QL: NO
INHIBIN A MOM SERPL: 0.94
INHIBIN A SERPL-MCNC: 157 PG/ML
INTEGRATED SCN PATIENT-IMP: NORMAL
IVF PREGNANCY: NO
LMP START DATE: NORMAL
MONOCHORIONIC TWINS: NO
PATHOLOGY STUDY: NORMAL
PREV FETUS DEFECT: NO
SERVICE CMNT-IMP: NO
SPECIMEN DRAWN SERPL: NORMAL
U ESTRIOL MOM SERPL: 0.82
U ESTRIOL SERPL-MCNC: 0.68 NG/ML
VALPROIC/CARBAMAZEPINE STATUS: NORMAL
WEIGHT UNITS: NORMAL

## 2019-02-25 ENCOUNTER — OFFICE VISIT (OUTPATIENT)
Dept: FAMILY MEDICINE | Facility: CLINIC | Age: 24
End: 2019-02-25
Payer: COMMERCIAL

## 2019-02-25 VITALS
WEIGHT: 161.2 LBS | HEART RATE: 89 BPM | DIASTOLIC BLOOD PRESSURE: 61 MMHG | OXYGEN SATURATION: 96 % | SYSTOLIC BLOOD PRESSURE: 95 MMHG | TEMPERATURE: 97.8 F

## 2019-02-25 DIAGNOSIS — O09.90 SUPERVISION OF HIGH RISK PREGNANCY, ANTEPARTUM: Primary | ICD-10-CM

## 2019-02-25 DIAGNOSIS — F12.90 MARIJUANA USE: ICD-10-CM

## 2019-02-25 DIAGNOSIS — Z87.898 HISTORY OF SUBSTANCE USE: ICD-10-CM

## 2019-02-25 LAB
AMPHETAMINES QUAL: NEGATIVE
BARBITURATES QUAL URINE: NEGATIVE
BENZODIAZEPINE QUAL URINE: NEGATIVE
BUPRENORPHINE QUAL URINE: NEGATIVE
CANNABINOIDS UR QL SCN: POSITIVE
COCAINE QUAL URINE: NEGATIVE
METHAMPHETAMINE: NEGATIVE
METHODONE QUAL: NEGATIVE
MORPHINE QUAL: NEGATIVE
OXYCODONE QUAL: NEGATIVE
PHENCYCLIDINE: NEGATIVE
PROPOXYPHENE: NEGATIVE
TEMPERATURE OF URINE WAS BETWEEN 90-100 DEGREES F: YES
TRICYCLIC ANTIDEPRESSANTS: NEGATIVE

## 2019-02-25 NOTE — PROGRESS NOTES
Preceptor Attestation:   Patient seen, evaluated and discussed with the resident. I have verified the content of the note, which accurately reflects my assessment of the patient and the plan of care.   Supervising Physician:  Shila Daugherty MD

## 2019-02-25 NOTE — PROGRESS NOTES
"Return OB visit  12-23 weeks    Subjective:   Blaire is a 23 year old  female at 19w6d who returns for prenatal care. LIEN 2019.  - Concerns today: none  - Patient reports no vaginal bleeding, no leakage of fluid. Fetal movement is present.   - Intermittent cramps, L>R  - No nausea/vomiting  - No heartburn. Has lightened up on the spicy food  - No vaginal discharge change.   - No dysuria.   - No headache, vision changes, lower extremity swelling, upper abdominal pain, chest pain, shortness of breath.   - Mood has been \"been fine\".    Objective:   LMP 2018 (Approximate)    Const: No distress  Abd: Gravid.   See flowsheet for FH, FHTs, edema     Prenatal labs:   -   Hemoglobin   Date Value Ref Range Status   2018 12.5 11.7 - 15.7 g/dL Final       Assessment & plan:   IUP at 19w6d weeks by 7w1d ultrasound not consistent with LMP.     - Prenatal labs reviewed: with no outstanding concerns  - Pregnancy complicated by: Depression and THC use in pregnancy   - Discussed quad screen. No elevated concerns on screens.    - Fetal survey ordered and to be completed this week. Will discuss findings at next appointment, unless concerning with needs for MFM referral.    - Discussed screening for gestational diabetes at 24-28 weeks.  - Pregnancy weight gain has been 2.268 kg (5 lb) to date, which is adequate.   - Provided counseling regarding  labor symptoms, depression and social support systems, breast feeding, contraception options after delivery. The patient plans to deliver at [facility] with myself and/or OB partner Dr. Maurer. Patient considering IUD placement post partum.      Breastfeeding education provided:  - Cue Feeding  - Supply and Demand  - Exclusivity   - Good Latch  - Avoiding Artifical Nipples    - Patient will continue taking prenatal vitamins and avoiding cigarettes & alcohol.   - Return to clinic in 4 weeks.    - Specific concerns addressed today:   1. History of substance " use  2. Marijuana use  Repeat UDS found to be positive for THC. Patient reports recently having an edible source of marijuana. Discussed with patient of mandatory screening for baby after delivery. Will continue to screen mother throughout pregnancy. Patient reports understanding and concerns substance use during pregnancy.   - Rapid Urine Drug Screen ([facility])    Options for treatment and follow-up care were reviewed with the patient and/or guardian. Blaire Owusu and/or guardian engaged in the decision making process and verbalized understanding of the options discussed and agreed with the final plan.    Margaret Tierney MD  [facility] Resident PGY-2  [phone]    Those present during this appointment were: patient, myself, MS3 and patient's significant other

## 2019-02-27 ENCOUNTER — ANCILLARY PROCEDURE (OUTPATIENT)
Dept: ULTRASOUND IMAGING | Facility: CLINIC | Age: 24
End: 2019-02-27
Attending: STUDENT IN AN ORGANIZED HEALTH CARE EDUCATION/TRAINING PROGRAM
Payer: COMMERCIAL

## 2019-02-27 DIAGNOSIS — O09.90 SUPERVISION OF HIGH RISK PREGNANCY, ANTEPARTUM: ICD-10-CM

## 2019-03-01 ENCOUNTER — PRE VISIT (OUTPATIENT)
Dept: MATERNAL FETAL MEDICINE | Facility: CLINIC | Age: 24
End: 2019-03-01

## 2019-03-05 ENCOUNTER — OFFICE VISIT (OUTPATIENT)
Dept: MATERNAL FETAL MEDICINE | Facility: CLINIC | Age: 24
End: 2019-03-05
Attending: OBSTETRICS & GYNECOLOGY
Payer: COMMERCIAL

## 2019-03-05 ENCOUNTER — HOSPITAL ENCOUNTER (OUTPATIENT)
Dept: ULTRASOUND IMAGING | Facility: CLINIC | Age: 24
Discharge: HOME | End: 2019-03-05
Attending: STUDENT IN AN ORGANIZED HEALTH CARE EDUCATION/TRAINING PROGRAM | Admitting: STUDENT IN AN ORGANIZED HEALTH CARE EDUCATION/TRAINING PROGRAM
Payer: COMMERCIAL

## 2019-03-05 ENCOUNTER — OFFICE VISIT (OUTPATIENT)
Dept: MATERNAL FETAL MEDICINE | Facility: CLINIC | Age: 24
End: 2019-03-05
Attending: STUDENT IN AN ORGANIZED HEALTH CARE EDUCATION/TRAINING PROGRAM
Payer: COMMERCIAL

## 2019-03-05 DIAGNOSIS — O35.BXX0 ECHOGENIC FOCUS OF HEART OF FETUS AFFECTING ANTEPARTUM CARE OF MOTHER, SINGLE OR UNSPECIFIED FETUS: Primary | ICD-10-CM

## 2019-03-05 DIAGNOSIS — O28.3 ABNORMAL PRENATAL ULTRASOUND: Primary | ICD-10-CM

## 2019-03-05 DIAGNOSIS — O28.3 ABNORMAL PRENATAL ULTRASOUND: ICD-10-CM

## 2019-03-05 DIAGNOSIS — O26.90 PREGNANCY RELATED CONDITION, ANTEPARTUM: ICD-10-CM

## 2019-03-05 PROCEDURE — 40000072 ZZH STATISTIC GENETIC COUNSELING, < 16 MIN: Mod: ZF | Performed by: GENETIC COUNSELOR, MS

## 2019-03-05 PROCEDURE — 36415 COLL VENOUS BLD VENIPUNCTURE: CPT | Performed by: OBSTETRICS & GYNECOLOGY

## 2019-03-05 PROCEDURE — 40000791 ZZHCL STATISTIC VERIFI PRENATAL TRISOMY 21,18,13: Performed by: OBSTETRICS & GYNECOLOGY

## 2019-03-05 PROCEDURE — 76811 OB US DETAILED SNGL FETUS: CPT

## 2019-03-05 NOTE — PROGRESS NOTES
"Please see \"Imaging\" tab under \"Chart Review\" for details of today's visit.    Gerard Elizabeth    "

## 2019-03-05 NOTE — PROGRESS NOTES
I met with Blaire today at the request of Dr. Gerard Elizabeth due to the intracardiac echogenic focus seen on her comprehensive ultrasound today. We discussed non-invasive prenatal screening and amniocentesis. Blaire currently declines invasive testing and would like to proceed with non-invasive prenatal screening via Innatal with [facility]. She will be informed of these results in approximately 7-10 days. She requested a detailed voice message be left at [phone] if she is unavailable. Sex chromosomes will not be analyzed.     Time spent: 15 minutes     Karely Park MS, [facility]  Maternal Fetal Medicine  [facility]  Ph: [phone]  [email or web address]

## 2019-03-11 ENCOUNTER — TELEPHONE (OUTPATIENT)
Dept: MATERNAL FETAL MEDICINE | Facility: CLINIC | Age: 24
End: 2019-03-11

## 2019-03-11 LAB — LAB SCANNED RESULT: NORMAL

## 2019-03-11 NOTE — TELEPHONE ENCOUNTER
"3/11/2019    I called Blaire to discuss her normal \"Innatal\" cell-free fetal DNA screening results.  Results came back negative for chromosome abnormalities in chromosomes 21, 18, & 13.  These normal results suggest the likelihood of fetal Down syndrome, trisomy 13, or trisomy 18 is very low. (See scanned report under lab tab in epic). Blaire opted out of sex chromosome analysis.    Discussed high detection rates for fetal Down syndrome, trisomies 13 and 18, and fetal sex chromosome abnormalities; however, not 100%. While this test is a highly accurate screen, it does not replace the diagnostic capabilities of standard prenatal diagnostic tests such as amniocentesis and CV[facility].     Tamera Soto MS, [facility]  Maternal Fetal Medicine  [facility]  Ph: [phone]  [email or web address]      "

## 2019-03-27 ENCOUNTER — OFFICE VISIT (OUTPATIENT)
Dept: FAMILY MEDICINE | Facility: CLINIC | Age: 24
End: 2019-03-27
Payer: COMMERCIAL

## 2019-03-27 VITALS
DIASTOLIC BLOOD PRESSURE: 67 MMHG | WEIGHT: 157.4 LBS | HEART RATE: 86 BPM | RESPIRATION RATE: 16 BRPM | SYSTOLIC BLOOD PRESSURE: 105 MMHG | TEMPERATURE: 97.6 F | OXYGEN SATURATION: 100 %

## 2019-03-27 DIAGNOSIS — O09.90 SUPERVISION OF HIGH RISK PREGNANCY, ANTEPARTUM: Primary | ICD-10-CM

## 2019-03-27 NOTE — Clinical Note
Patient's visit is an ELDON but wasn't scheduled that way so I can't put in fundal height or fetal heart tones. Please change the visit type so I can complete the visit and route to Payal Vanessa to sign and close visit. Thanks!Jena

## 2019-03-27 NOTE — PROGRESS NOTES
Preceptor Attestation:   Patient seen, evaluated and discussed with the resident. I have verified the content of the note, which accurately reflects my assessment of the patient and the plan of care.   Supervising Physician:  Payal Vanessa MD

## 2019-03-27 NOTE — PROGRESS NOTES
"Return OB visit  24-28 weeks    Subjective:   Blaire is a 23 year old  female at 24w1d who returns for prenatal care. LIEN 2019.  - Concerns today: had some dark brown discharge after intercourse 2 nights ago, now resolved. No associated contractions, is feeling baby moving. Fetal survey and comprehensive US show that placenta is anterior, no previa. She also tells me that her urine drug screen will be positive for cannabis because she smoked marijuana on . She says she intends to stop marijuana and that  was her last day.   - Patient reports no vaginal bleeding, no contractions, no leakage of fluid. Fetal movement is present.   - No nausea/vomiting. No heartburn.   - No dysuria.   - No headache, vision changes, lower extremity swelling, upper abdominal pain, chest pain, shortness of breath.   - Mood has been \"ok.\" Is doing well with the celexa, has good support systems in place.     Objective:   /67   Pulse 86   Temp 97.6  F (36.4  C) (Oral)   Resp 16   Wt 71.4 kg (157 lb 6.4 oz)   LMP 2018 (Approximate)   SpO2 100%    Const: No distress  Abd: Gravid.   See flowsheet for FH, FHTs, edema.    Prenatal labs:   Hemoglobin   Date Value Ref Range Status   2018 12.5 11.7 - 15.7 g/dL Final       Assessment & plan:   IUP at 24w1d weeks by 7w1d first trimester ultrasound.     - Pregnancy complicated by: depression on celexa, cannabis use in pregnancy  - Prenatal labs reviewed. Patient is Rh positive: antibody re-screen and rhogam is therefore not indicated.  - Reviewed fetal survey results with patient: echogenic intracardiac focus in LV and possible thickening of the nuchal fold. Seen by MFM for comprehensive US on 3/5/2019 for follow up US. Comp US showed an echogenic intracardiac focus, otherwise normal. She had a follow up NIPT which was negative to trisomy 13, 18 and 21.   - Discussed screening for gestational diabetes. 1 hour GCT will be obtained at next visit at 28wks " EGA.  - Discussed need for Tdap, patient agreed to vaccination at next visit. Patient has received flu shot this pregnancy.   - Pregnancy weight gain has been 2.359 kg (5 lb 3.2 oz) to date, which is adequate.   - Provided counseling regarding  labor symptoms, depression and social support systems, breast feeding, contraception options after delivery, baby supplies and car seat, proper seatbelt use during pregnancy. The patient plans to deliver at [facility] with OB partners Dr. Tierney and/or Dr. Troare.  care at [facility].   - Patient will continue taking prenatal vitamins and avoiding cigarettes & alcohol. We also discussed avoidance of cannabis during pregnancy.     -Breastfeeding education provided:  - Cue Feeding  - Supply and Demand  - Exclusivity   - Good Latch  - Avoiding Artifical Nipples    - Return to clinic in 4 weeks.     - Specific concerns addressed today:   1. Supervision of high risk pregnancy, antepartum  UDS positive or cannabis which was already anticipated by patient. She is aware that baby will need mandatory screening after delivery and that we will continue to check urine during pregnancy.   - Rapid Urine Drug Screen ([facility])    Options for treatment and follow-up care were reviewed with the patient and/or guardian. Blaire Owusu and/or guardian engaged in the decision making process and verbalized understanding of the options discussed and agreed with the final plan.    Jena Armenta MD  Family Medicine PGY3 Resident

## 2019-03-29 NOTE — PATIENT INSTRUCTIONS
Thank you for coming to [facility]!  - If you had lab testing today and your results are normal they will be be mailed to you or sent to your MyChart within seven days.   - If the lab tests need quick action we will call you with the results.  - The phone number we will call with results is # [phone] (home) . If this is not the best number please call our clinic and change the number.  - If any referrals were made to [facility] and you don't get a call, call central scheduling [phone]  - If you need any refills please call your pharmacy and they will contact us.  - If you had an XRay/CT/Ultrasound/MRI ordered the number is [phone] to schedule or change your appointment  - If you have any concerns about today's visit or wish to schedule another appointment please call our office [phone] (8-5:00 M-F)  - If you have urgent medical concerns call [phone] at any time of the day.  - If you have a medical emergency please call [phone].    Because you are pregnant, we have additional resources for you:  - You may call and ask to speak with one of our clinic nurses at [phone] during normal business hours, for non-urgent questions about your pregnancy.  - Immediate OB help is also available 24 hours a day, seven days a week via the [facility] - [phone]  located at [address] [place], [place] [id number]    Prenatal Reminders:  Before 14 weeks: dating ultrasound       This ultrasound helps us determine your dates accurately.  15 - 18 weeks: Optional genetic testing (quad screen)       This testing helps understand your baby's risk for some genetic abnormalities.  22 - 24 weeks: Ultrasound (fetal anatomy survey)       This testing will look for early growth abnormalities, and might tell the baby's sex.  24 - 28 weeks: One hour sugar test (GCT)        This test helps identify diabetes of pregnancy.  27 - 36 weeks: Tetanus shot  (Tdap)       This shot helps protect you and your baby from tetanus and whooping cough.  36 weeks and later: Group B Strep test (GBS)       This test helps predict if you need antibiotics in labor to prevent infection in your baby.  Anytime September to April: flu shot       This shot helps protect you and your family from the flu.  This is especially important during pregnancy!  Once every trimester: blood iron test (hemoglobin)       This test helps us know if you will need extra iron to support your baby.  At any time during or after your pregnancy: Some women experience baby blues.  We can help you with:  ? Feeling anxious, ? Overwhelmed or sad       ? Trouble sleeping ? Crying uncontrollably     ? Trouble caring for yourself or baby.  How frequently should you see your provider?  < 28 weeks: every 4 weeks  28-36 weeks: every 2 weeks  >36 weeks: every week

## 2019-04-24 ENCOUNTER — OFFICE VISIT (OUTPATIENT)
Dept: FAMILY MEDICINE | Facility: CLINIC | Age: 24
End: 2019-04-24
Payer: COMMERCIAL

## 2019-04-24 VITALS
HEIGHT: 69 IN | BODY MASS INDEX: 24.14 KG/M2 | OXYGEN SATURATION: 98 % | DIASTOLIC BLOOD PRESSURE: 68 MMHG | WEIGHT: 163 LBS | HEART RATE: 96 BPM | SYSTOLIC BLOOD PRESSURE: 107 MMHG

## 2019-04-24 DIAGNOSIS — O09.90 SUPERVISION OF HIGH RISK PREGNANCY, ANTEPARTUM: Primary | ICD-10-CM

## 2019-04-24 LAB
AMPHETAMINES QUAL: NEGATIVE
BARBITURATES QUAL URINE: NEGATIVE
BENZODIAZEPINE QUAL URINE: NEGATIVE
BUPRENORPHINE QUAL URINE: NEGATIVE
CANNABINOIDS UR QL SCN: POSITIVE
COCAINE QUAL URINE: NEGATIVE
GLU GEST SCREEN 1HR 50G: 60 (ref 0–129)
HGB BLD-MCNC: 11.7 G/DL (ref 11.7–15.7)
METHAMPHETAMINE: NEGATIVE
METHODONE QUAL: NEGATIVE
MORPHINE QUAL: NEGATIVE
OXYCODONE QUAL: NEGATIVE
PHENCYCLIDINE: NEGATIVE
PROPOXYPHENE: NEGATIVE
TEMPERATURE OF URINE WAS BETWEEN 90-100 DEGREES F: YES
TRICYCLIC ANTIDEPRESSANTS: NEGATIVE

## 2019-04-24 ASSESSMENT — MIFFLIN-ST. JEOR: SCORE: 1563.74

## 2019-04-24 NOTE — PROGRESS NOTES
Preceptor Attestation:   Patient seen, evaluated and discussed with the resident. I have verified the content of the note, which accurately reflects my assessment of the patient and the plan of care.   Supervising Physician:  Alyssa Álvarez MD

## 2019-04-24 NOTE — PATIENT INSTRUCTIONS
Thank you for coming to [facility]!  - If you had lab testing today and your results are normal they will be be mailed to you or sent to your MyChart within seven days.   - If the lab tests need quick action we will call you with the results.  - The phone number we will call with results is # [phone] (home) . If this is not the best number please call our clinic and change the number.  - If any referrals were made to [facility] and you don't get a call, call central scheduling [phone]  - If you need any refills please call your pharmacy and they will contact us.  - If you had an XRay/CT/Ultrasound/MRI ordered the number is [phone] to schedule or change your appointment  - If you have any concerns about today's visit or wish to schedule another appointment please call our office [phone] (8-5:00 M-F)  - If you have urgent medical concerns call [phone] at any time of the day.  - If you have a medical emergency please call [phone].    Because you are pregnant, we have additional resources for you:  - You may call and ask to speak with one of our clinic nurses at [phone] during normal business hours, for non-urgent questions about your pregnancy.  - Immediate OB help is also available 24 hours a day, seven days a week via the [facility] - [phone]  located at [address] [place], [place] [id number]    Prenatal Reminders:  Before 14 weeks: dating ultrasound       This ultrasound helps us determine your dates accurately.  15 - 18 weeks: Optional genetic testing (quad screen)       This testing helps understand your baby's risk for some genetic abnormalities.  22 - 24 weeks: Ultrasound (fetal anatomy survey)       This testing will look for early growth abnormalities, and might tell the baby's sex.  24 - 28 weeks: One hour sugar test (GCT)        This test helps identify diabetes of pregnancy.  27 - 36 weeks: Tetanus shot  (Tdap)       This shot helps protect you and your baby from tetanus and whooping cough.  36 weeks and later: Group B Strep test (GBS)       This test helps predict if you need antibiotics in labor to prevent infection in your baby.  Anytime September to April: flu shot       This shot helps protect you and your family from the flu.  This is especially important during pregnancy!  Once every trimester: blood iron test (hemoglobin)       This test helps us know if you will need extra iron to support your baby.  At any time during or after your pregnancy: Some women experience baby blues.  We can help you with:  ? Feeling anxious, ? Overwhelmed or sad   ? Trouble sleeping ? Crying uncontrollably? Trouble caring for yourself or baby.  How frequently should you see your provider?  < 28 weeks: every 4 weeks  28-36 weeks: every 2 weeks  >36 weeks: every week

## 2019-04-24 NOTE — PROGRESS NOTES
"Return OB visit  24-28 weeks    Subjective:   Blaire is a 23 year old  female at 28w1d who returns for prenatal care. LIEN 2019.  - Concerns today: none. She tells me she has continued to use marijuana since our last visit but has taken out any products she has at home to use marijuana, also no longer has any marijuana at home.   - Patient reports no vaginal bleeding, no contractions, no leakage of fluid. Fetal movement is present.   - No nausea/vomiting. No heartburn.   - No vaginal discharge. No dysuria.   - No headache, vision changes, lower extremity swelling, upper abdominal pain, chest pain, shortness of breath.   - Mood has been \"good.\"     Objective:   /68   Pulse 96   Ht 1.753 m (5' 9\")   Wt 73.9 kg (163 lb)   LMP 2018 (Approximate)   SpO2 98%   BMI 24.07 kg/m     Const: No distress  Abd: Gravid.   See flowsheet for FH, FHTs, edema.    Prenatal labs:   Hemoglobin   Date Value Ref Range Status   2018 12.5 11.7 - 15.7 g/dL Final       Assessment & plan:   IUP at 28w1d weeks by 7w1d first trimester ultrasound.     - Pregnancy complicated by: depression on celexa, cannabis use in pregnancy  - will check UDS today. Counseled patient on avoidance of cannabis during pregnancy.   - Prenatal labs reviewed. Patient is Rh positive: antibody re-screen and rhogam is therefore not indicated. Second trimester hemoglobin was ordered.   - Reviewed fetal survey results with patient: echogenic intracardiac focus in LV and possible thickening of the nuchal fold. Seen by [facility] for comprehensive US on 3/5/2019 for follow up US. Comp US showed an echogenic intracardiac focus, otherwise normal. She had a follow up NIPT which was negative to trisomy 13, 18 and 21.   - Discussed screening for gestational diabetes. 1 hour GCT will be obtained today.  - Per [facility] recommendations, repeat syphilis screening done today, Anti-treponema ordered  - Discussed need for Tdap, patient agreed to vaccination. Patient " has received flu shot this pregnancy.   - Pregnancy weight gain has been 3.175 kg (7 lb) to date, which is adequate.   - Provided counseling regarding  labor symptoms, depression and social support systems, breast feeding, contraception options after delivery, baby supplies and car seat, proper seatbelt use during pregnancy. The patient plans to deliver at [facility] with OB partner.  care at [facility].   - Patient will continue taking prenatal vitamins and avoiding cigarettes & alcohol.     -Breastfeeding education provided:  - Cue Feeding  - Supply and Demand  - Exclusivity   - Good Latch  - Avoiding Artifical Nipples    - Return to clinic in 4 weeks.     - Specific concerns addressed today:   1. Supervision of high risk pregnancy, antepartum  - Glucose Challenge  1 Hr  ([facility])  - Treponema Abs w Reflex to RPR and Titer  - Hemoglobin  - Rapid Urine Drug Screen (hospitals)      Options for treatment and follow-up care were reviewed with the patient and/or guardian. Blaire Owusu and/or guardian engaged in the decision making process and verbalized understanding of the options discussed and agreed with the final plan.    Jena Armenta MD  Family Medicine PGY3 Resident

## 2019-04-25 LAB — T PALLIDUM AB SER QL: NONREACTIVE

## 2019-05-24 ENCOUNTER — OFFICE VISIT (OUTPATIENT)
Dept: FAMILY MEDICINE | Facility: CLINIC | Age: 24
End: 2019-05-24
Payer: COMMERCIAL

## 2019-05-24 VITALS
HEART RATE: 101 BPM | OXYGEN SATURATION: 97 % | RESPIRATION RATE: 16 BRPM | SYSTOLIC BLOOD PRESSURE: 112 MMHG | BODY MASS INDEX: 24.11 KG/M2 | TEMPERATURE: 97.9 F | WEIGHT: 168.4 LBS | HEIGHT: 70 IN | DIASTOLIC BLOOD PRESSURE: 68 MMHG

## 2019-05-24 DIAGNOSIS — F19.10 SUBSTANCE ABUSE AFFECTING PREGNANCY, ANTEPARTUM (H): ICD-10-CM

## 2019-05-24 DIAGNOSIS — O99.320 SUBSTANCE ABUSE AFFECTING PREGNANCY, ANTEPARTUM (H): ICD-10-CM

## 2019-05-24 DIAGNOSIS — O09.90 SUPERVISION OF HIGH RISK PREGNANCY, ANTEPARTUM: Primary | ICD-10-CM

## 2019-05-24 ASSESSMENT — PAIN SCALES - GENERAL: PAINLEVEL: NO PAIN (0)

## 2019-05-24 ASSESSMENT — MIFFLIN-ST. JEOR: SCORE: 1596.17

## 2019-05-24 NOTE — PROGRESS NOTES
"Return OB visit  29-34 weeks    Subjective:   Blaire is a 23 year old  female at 32w3d who returns for prenatal care. LIEN 2019.  - Concerns today: got approved through an insurance program to get a free breast pump but needs the name of an attending at [facility] because she was told by her insurance company that residents cannot sign off on it.   - Patient reports no vaginal bleeding, no leakage of fluid. Contractions absent. Fetal movement is present.   - No nausea/vomiting. Occasional heartburn, relieved with prn tums.   - No vaginal discharge. No dysuria.   - No headache, vision changes, lower extremity swelling, upper abdominal pain, chest pain, shortness of breath.   - Mood has been \"nervous, worried.\" Worried about CPS involvement in post partum period for marijuana use in pregnancy.     Objective:   /68   Pulse 101   Temp 97.9  F (36.6  C) (Oral)   Resp 16   Ht 1.765 m (5' 9.5\")   Wt 76.4 kg (168 lb 6.4 oz)   LMP 2018 (Approximate)   SpO2 97%   Breastfeeding? No   BMI 24.51 kg/m     Const: No distress  Abd: Gravid.   See flowsheet for FH, FHTs, fetal presentation, edema.    Prenatal labs:   -   Hemoglobin   Date Value Ref Range Status   2019 11.7 11.7 - 15.7 g/dL Final       Assessment & plan:   IUP at 32w3d weeks by 7w1d first trimester ultrasound.     - Pregnancy complicated by: depression on celexa, cannabis use in pregnancy  - Prenatal labs reviewed. Patient did pass 1 hour GCT. Fetal survey showed echogenic intracardiac focus in LV and possible thickening of nuchal fold. Seen by [facility] for comprehensive US on 3/5/2019 which showed an echogenic intracardiac focus, otherwise normal. NIPT negative for trisomy 13,18, and 21.    - Patient is measuring appropriately for gestational age. Pregnancy weight gain has been 5.625 kg (12 lb 6.4 oz) to date, which is adequate.   - Patient plans to breast feed and use IUD for contraception after delivery.   - Discussed circumcision: " undecided at this time.   - Provided counseling regarding:  labor signs, hospital readiness, sibling preparation, parenting resources (WIC, etc).   - Patient will continue taking prenatal vitamins and avoiding cigarettes & alcohol. Tdap and flu shot have been given.     -Breastfeeding education provided:  - Supporting a non-medicated birth  - Skin to Skin  - Non-separation of mother and baby    - Return to clinic in 2 weeks.     - Specific concerns addressed today:   1. Supervision of high risk pregnancy, antepartum  2. Substance abuse affecting pregnancy, antepartum  - Rapid Urine Drug Screen ([facility])  UDS positive for cannabinoids. Continue to recommend abstinence.       Options for treatment and follow-up care were reviewed with the patient and/or guardian. Blaire Owusu and/or guardian engaged in the decision making process and verbalized understanding of the options discussed and agreed with the final plan.    Jena Armenta MD  Family Medicine PGY3 Resident

## 2019-05-24 NOTE — PATIENT INSTRUCTIONS
You can write the name Dr. iHll, who is my advisor for your breast pump.     Thank you for coming to [facility]!  - If you had lab testing today and your results are normal they will be be mailed to you or sent to your MyChart within seven days.   - If the lab tests need quick action we will call you with the results.  - The phone number we will call with results is # [phone] (home) . If this is not the best number please call our clinic and change the number.  - If any referrals were made to [facility] and you don't get a call, call central scheduling [phone]  - If you need any refills please call your pharmacy and they will contact us.  - If you had an XRay/CT/Ultrasound/MRI ordered the number is [phone] to schedule or change your appointment  - If you have any concerns about today's visit or wish to schedule another appointment please call our office [phone] (8-5:00 M-F)  - If you have urgent medical concerns call [phone] at any time of the day.  - If you have a medical emergency please call [phone].    Because you are pregnant, we have additional resources for you:  - You may call and ask to speak with one of our clinic nurses at [phone] during normal business hours, for non-urgent questions about your pregnancy.  - Immediate OB help is also available 24 hours a day, seven days a week via the [facility] - [phone]  located at [address] [place], [place] [id number]    Prenatal Reminders:  Before 14 weeks: dating ultrasound       This ultrasound helps us determine your dates accurately.  15 - 18 weeks: Optional genetic testing (quad screen)       This testing helps understand your baby's risk for some genetic abnormalities.  22 - 24 weeks: Ultrasound (fetal anatomy survey)       This testing will look for early growth abnormalities, and might tell the baby's sex.  24 - 28 weeks: One hour sugar test (GCT)        This  test helps identify diabetes of pregnancy.  27 - 36 weeks: Tetanus shot (Tdap)       This shot helps protect you and your baby from tetanus and whooping cough.  36 weeks and later: Group B Strep test (GBS)       This test helps predict if you need antibiotics in labor to prevent infection in your baby.  Anytime September to April: flu shot       This shot helps protect you and your family from the flu.  This is especially important during pregnancy!  Once every trimester: blood iron test (hemoglobin)       This test helps us know if you will need extra iron to support your baby.  At any time during or after your pregnancy: Some women experience baby blues.  We can help you with:  ? Feeling anxious, ? Overwhelmed or sad   ? Trouble sleeping ? Crying uncontrollably? Trouble caring for yourself or baby.  How frequently should you see your provider?  < 28 weeks: every 4 weeks  28-36 weeks: every 2 weeks  >36 weeks: every week

## 2019-05-24 NOTE — PROGRESS NOTES
Preceptor Attestation:   Patient seen, evaluated and discussed with the resident. I have verified the content of the note, which accurately reflects my assessment of the patient and the plan of care.   Supervising Physician:  Ezio Rosa MD

## 2019-06-07 ENCOUNTER — OFFICE VISIT (OUTPATIENT)
Dept: FAMILY MEDICINE | Facility: CLINIC | Age: 24
End: 2019-06-07
Payer: COMMERCIAL

## 2019-06-07 VITALS
TEMPERATURE: 98.4 F | DIASTOLIC BLOOD PRESSURE: 70 MMHG | RESPIRATION RATE: 18 BRPM | HEIGHT: 68 IN | HEART RATE: 95 BPM | OXYGEN SATURATION: 97 % | BODY MASS INDEX: 25.52 KG/M2 | WEIGHT: 168.4 LBS | SYSTOLIC BLOOD PRESSURE: 109 MMHG

## 2019-06-07 DIAGNOSIS — O99.320 SUBSTANCE ABUSE AFFECTING PREGNANCY, ANTEPARTUM (H): ICD-10-CM

## 2019-06-07 DIAGNOSIS — F19.10 SUBSTANCE ABUSE AFFECTING PREGNANCY, ANTEPARTUM (H): ICD-10-CM

## 2019-06-07 DIAGNOSIS — R12 HEARTBURN DURING PREGNANCY, ANTEPARTUM: ICD-10-CM

## 2019-06-07 DIAGNOSIS — O26.899 HEARTBURN DURING PREGNANCY, ANTEPARTUM: ICD-10-CM

## 2019-06-07 DIAGNOSIS — O09.90 SUPERVISION OF HIGH RISK PREGNANCY, ANTEPARTUM: Primary | ICD-10-CM

## 2019-06-07 ASSESSMENT — MIFFLIN-ST. JEOR: SCORE: 1569.11

## 2019-06-07 NOTE — PROGRESS NOTES
"Return OB visit  29-34 weeks      Subjective:   Blaire is a 24 year old  female at 34w3d who returns for prenatal care. LIEN 2019.  - Concerns today: acid reflux giving her a lack of appetite  - Patient reports no vaginal bleeding, no leakage of fluid. Contractions absent. Fetal movement is present.   - No nausea/vomiting.   - No abnormal vaginal discharge. No dysuria.   - No headache, vision changes, lower extremity swelling, upper abdominal pain, chest pain, shortness of breath.   - Mood has been \"good.\"    Objective:   /70 (BP Location: Left arm, Patient Position: Sitting, Cuff Size: Adult Regular)   Pulse 95   Temp 98.4  F (36.9  C) (Oral)   Resp 18   Ht 1.73 m (5' 8.11\")   Wt 76.4 kg (168 lb 6.4 oz)   LMP 2018 (Approximate)   SpO2 97%   BMI 25.52 kg/m     Const: No distress  Abd: Gravid.   See flowsheet for FH, FHTs, fetal presentation, edema.    Prenatal labs:   -   Hemoglobin   Date Value Ref Range Status   2019 11.7 11.7 - 15.7 g/dL Final       Assessment & plan:   IUP at 34w3d weeks by 7w1d first trimester ultrasound.     - Pregnancy complicated by: depression on celexa, cannabis use in pregnancy  - Prenatal labs reviewed. Patient did pass 1 hour GCT. Fetal survey showed echogenic intracardiac focus in LV and possible thickening of nuchal fold. Seen by [facility] for comprehensive US on 3/5/2019 which showed an echogenic intracardiac focus, otherwise normal. NIPT negative for trisomy 13,18, and 21  - Patient is measuring appropriately for gestational age. Pregnancy weight gain has been 5.625 kg (12 lb 6.4 oz) to date, which is not adequate but will monitor for improvement with Zantac use to suppress acid and improve appetite.   - Patient plans to breast feed and use IUD for contraception after delivery.   - Discussed circumcision: undecided at this time, FOB wants, Blaire not sure yet.   - Provided counseling regarding:  labor signs, hospital readiness, sibling preparation, " parenting resources (WIC, etc).   - Patient will continue taking prenatal vitamins and avoiding cigarettes & alcohol. Tdap and flu shot have been given.       -Breastfeeding education provided:  - Supporting a non-medicated birth  - Skin to Skin  - Non-separation of mother and baby    - Return to clinic in 2 weeks.     - Specific concerns addressed today:   1. Substance abuse affecting pregnancy, antepartum  UDS positive for cannabinoids. Patient also reports ongoing use to cope with anxiety related to FOB although she is motivated to stop. Will plan to have behavioral health team assist with next visit in regards to coping with anxiety and additional resources to manage mental health intra- and post- partum.   - Rapid Urine Drug Screen ([facility])    2. Heartburn  -zantac 150mg BID      Options for treatment and follow-up care were reviewed with the patient and/or guardian. Blaire Owusu and/or guardian engaged in the decision making process and verbalized understanding of the options discussed and agreed with the final plan.    Jena Armenta MD  Family Medicine PGY3 Resident

## 2019-06-07 NOTE — PROGRESS NOTES
Preceptor Attestation:   Patient seen, evaluated and discussed with the resident. I have verified the content of the note, which accurately reflects my assessment of the patient and the plan of care.   Supervising Physician:  Lesa Linton MD

## 2019-06-10 ENCOUNTER — TELEPHONE (OUTPATIENT)
Dept: FAMILY MEDICINE | Facility: CLINIC | Age: 24
End: 2019-06-10

## 2019-06-10 DIAGNOSIS — O99.619 GASTROESOPHAGEAL REFLUX IN PREGNANCY: Primary | ICD-10-CM

## 2019-06-10 DIAGNOSIS — K21.9 GASTROESOPHAGEAL REFLUX IN PREGNANCY: Primary | ICD-10-CM

## 2019-06-10 NOTE — TELEPHONE ENCOUNTER
Prior Authorization Retail Medication Request    Medication/Dose: ranitidine (ZANTAC) 150 MG tablet  ICD code (if different than what is on RX):  Heartburn during pregnancy, antepartum [O26.899, R12]   Previously Tried and Failed:  See chart  Rationale:  See chart    Insurance Name:  [facility]  Insurance ID:  [id number]       Pharmacy Information (if different than what is on RX)  Name:  [facility]  Phone:  [phone]     LAUREL Herrmann 8:04 AM Chelo 10, 2019

## 2019-06-11 NOTE — TELEPHONE ENCOUNTER
Central Prior Authorization Team   Phone: [phone]    PA Initiation    Medication: ranitidine (ZANTAC) 150 MG tablet  Insurance Company: [employer] [place] - Phone [phone] Fax [phone]  Pharmacy Filling the Rx: [id number] - [place], [place] - [address] [place] [address] & [facility]  Filling Pharmacy Phone: [phone]  Filling Pharmacy Fax:    Start Date: 6/11/2019

## 2019-06-14 NOTE — TELEPHONE ENCOUNTER
Called insurance at [phone] to check status of Prior Auth. It is currently under review, and can take up to 10 calendar days. I will follow up again mid next week.

## 2019-06-18 ENCOUNTER — OFFICE VISIT (OUTPATIENT)
Dept: FAMILY MEDICINE | Facility: CLINIC | Age: 24
End: 2019-06-18
Payer: COMMERCIAL

## 2019-06-18 VITALS
RESPIRATION RATE: 16 BRPM | DIASTOLIC BLOOD PRESSURE: 64 MMHG | WEIGHT: 172.4 LBS | HEIGHT: 70 IN | HEART RATE: 90 BPM | TEMPERATURE: 98.4 F | BODY MASS INDEX: 24.68 KG/M2 | OXYGEN SATURATION: 96 % | SYSTOLIC BLOOD PRESSURE: 106 MMHG

## 2019-06-18 DIAGNOSIS — O09.90 SUPERVISION OF HIGH RISK PREGNANCY, ANTEPARTUM: Primary | ICD-10-CM

## 2019-06-18 DIAGNOSIS — O99.320 SUBSTANCE ABUSE AFFECTING PREGNANCY, ANTEPARTUM (H): ICD-10-CM

## 2019-06-18 DIAGNOSIS — F19.10 SUBSTANCE ABUSE AFFECTING PREGNANCY, ANTEPARTUM (H): ICD-10-CM

## 2019-06-18 ASSESSMENT — MIFFLIN-ST. JEOR: SCORE: 1609.31

## 2019-06-18 NOTE — PATIENT INSTRUCTIONS
Thank you for coming to [facility]!  - If you had lab testing today and your results are normal they will be be mailed to you or sent to your MyChart within seven days.   - If the lab tests need quick action we will call you with the results.  - The phone number we will call with results is # [phone] (home) . If this is not the best number please call our clinic and change the number.  - If any referrals were made to [facility] and you don't get a call, call central scheduling [phone]  - If you need any refills please call your pharmacy and they will contact us.  - If you had an XRay/CT/Ultrasound/MRI ordered the number is [phone] to schedule or change your appointment  - If you have any concerns about today's visit or wish to schedule another appointment please call our office [phone] (8-5:00 M-F)  - If you have urgent medical concerns call [phone] at any time of the day.  - If you have a medical emergency please call [phone].    Because you are pregnant, we have additional resources for you:  - You may call and ask to speak with one of our clinic nurses at [phone] during normal business hours, for non-urgent questions about your pregnancy.  - Immediate OB help is also available 24 hours a day, seven days a week via the [facility] - [phone]  located at [address] [place], [place] [id number]    Prenatal Reminders:  Before 14 weeks: dating ultrasound       This ultrasound helps us determine your dates accurately.  15 - 18 weeks: Optional genetic testing (quad screen)       This testing helps understand your baby's risk for some genetic abnormalities.  22 - 24 weeks: Ultrasound (fetal anatomy survey)       This testing will look for early growth abnormalities, and might tell the baby's sex.  24 - 28 weeks: One hour sugar test (GCT)        This test helps identify diabetes of pregnancy.  27 - 36 weeks: Tetanus shot  (Tdap)       This shot helps protect you and your baby from tetanus and whooping cough.  36 weeks and later: Group B Strep test (GBS)       This test helps predict if you need antibiotics in labor to prevent infection in your baby.  Anytime September to April: flu shot       This shot helps protect you and your family from the flu.  This is especially important during pregnancy!  Once every trimester: blood iron test (hemoglobin)       This test helps us know if you will need extra iron to support your baby.  At any time during or after your pregnancy: Some women experience baby blues.  We can help you with:  ? Feeling anxious, ? Overwhelmed or sad   ? Trouble sleeping ? Crying uncontrollably? Trouble caring for yourself or baby.  How frequently should you see your provider?  < 28 weeks: every 4 weeks  28-36 weeks: every 2 weeks  >36 weeks: every week

## 2019-06-18 NOTE — PROGRESS NOTES
"Return OB visit  35-39 weeks    Subjective:   Blaire is a 24 year old  female at 36w0d who returns for prenatal care. LIEN 2019.  - Concerns today: none  - Patient reports no vaginal bleeding, no leakage of fluid. Contractions none. Fetal movement is present.   - No vaginal discharge. No dysuria.   - No fevers, chest pain, shortness of breath, diarrhea, constipation.   - Mood has been good.    Objective:   /64 (BP Location: Left arm, Patient Position: Sitting, Cuff Size: Adult Regular)   Pulse 90   Temp 98.4  F (36.9  C) (Oral)   Resp 16   Ht 1.765 m (5' 9.5\")   Wt 78.2 kg (172 lb 6.4 oz)   LMP 2018 (Approximate)   SpO2 96%   BMI 25.09 kg/m     Const: No distress  Abd: Gravid.   See flowsheet for FH, FHTs, fetal presentation, EFW, edema.     Prenatal labs:   Hemoglobin   Date Value Ref Range Status   2019 11.7 11.7 - 15.7 g/dL Final       Assessment & plan:   IUP at 36w0d weeks by first trimester ultrasound.     - Pregnancy complicated by: depression on celexa, cannabis use in pregnancy  - Prenatal labs reviewed. Patient did pass 1 hour GCT. Fetal survey showed showed echogenic intracardiac focus in LV and possible thickening of nuchal fold. Seen by [facility] for comprehensive US on 3/5/2019 which showed an echogenic intracardiac focus, otherwise normal. NIPT negative for trisomy 13,18, and 21  - GBS obtained today. 3rd trimester Hb obtained 19 - 11.7.   - Patient is measuring appropriately for gestational age. Pregnancy weight gain has been 7.439 kg (16 lb 6.4 oz) to date, which is adequate.   - Patient plans to breast feed and use IUD for contraception after delivery.   - Circumcision: did not discuss today  - Discussed  care & plan for vaccinations.   - Provided counseling regarding labor signs, hospital readiness (transportation, bags packed), pain control options during labor.   - Patient will continue taking prenatal vitamins and avoiding cigarettes & alcohol. Tdap and " "flu shot have been given.     -Breastfeeding education provided:  - Supporting a non-medicated birth  - Skin to Skin  - Non-separation of mother and baby    - Specific concerns addressed today:   1. Substance abuse affecting pregnancy, antepartum  UDS positive for cannabinoids. Patient's last use was 6/14/19. She is trying to stop but has had trouble breaking the \"habit.\" She has stopped smoking cigarettes and using cocaine, though. Have previously discussed with patient that she and baby will be tested for drugs in the hospital at delivery and CPS may be involved.   - Rapid Urine Drug Screen ([facility])    2. Heartburn  Heartburn and appetite have greatly improved since last visit. She is taking ranitidine once daily in the evening, which relieves her symptoms.  - Continue ranitidine 150 mg daily or BID    - Return to clinic in 1 week.    Options for treatment and follow-up care were reviewed with the patient and/or guardian. Blaire Owusu and/or guardian engaged in the decision making process and verbalized understanding of the options discussed and agreed with the final plan.    Michelle Espinosa, MS4  [employer]    I was present with the medical student who participated in the service and in the documentation of this note. I have verified the history and personally performed the physical exam and medical decision making, and have verified the content of the note, which accurately reflects my assessment of the patient and the plan of care.  Jena Armenta MD  Family Medicine PGY3 Resident          "

## 2019-06-19 LAB
GP B STREP DNA SPEC QL NAA+PROBE: POSITIVE
SPECIMEN SOURCE: ABNORMAL

## 2019-06-20 ENCOUNTER — TELEPHONE (OUTPATIENT)
Dept: FAMILY MEDICINE | Facility: CLINIC | Age: 24
End: 2019-06-20

## 2019-06-20 DIAGNOSIS — O26.899 HEARTBURN DURING PREGNANCY, ANTEPARTUM: Primary | ICD-10-CM

## 2019-06-20 DIAGNOSIS — R12 HEARTBURN DURING PREGNANCY, ANTEPARTUM: Primary | ICD-10-CM

## 2019-06-20 RX ORDER — FAMOTIDINE 20 MG/1
20 TABLET, FILM COATED ORAL 2 TIMES DAILY
Qty: 60 TABLET | Refills: 1 | Status: SHIPPED | OUTPATIENT
Start: 2019-06-20 | End: 2019-08-13

## 2019-06-20 NOTE — TELEPHONE ENCOUNTER
Prior Authorization:     Denied Reason: see chart    Alternatives: None available    Pharmacy notified.  Routing to MD    Please advise if you would like to move forward with the appeal process or plan to  prescribe an alternative medication. If Appeal is desired a letter of medical necessity with denial rationale is needed to start the appeal process.   Route to PA Pool when completed.    LAUREL Herrmann 10:50 AM June 20, 2019

## 2019-06-20 NOTE — TELEPHONE ENCOUNTER
Formulary alternative ordered. Will not proceed with PA.     Jena Armenta MD  Family Medicine PGY3 Resident

## 2019-06-20 NOTE — TELEPHONE ENCOUNTER
Prior Authorization Retail Medication Request    Medication/Dose: famotidine (PEPCID) 20 MG tablet  ICD code (if different than what is on RX):  Gastroesophageal reflux in pregnancy [O99.619, K21.9]  Previously Tried and Failed:  See chart  Rationale:  See chart    Insurance Name:  [facility]  Insurance ID:  [id number]       Pharmacy Information (if different than what is on RX)  Name:  [facility]  Phone:  [phone]    LAUREL Herrmann 5:04 PM June 20, 2019

## 2019-06-20 NOTE — TELEPHONE ENCOUNTER
PRIOR AUTHORIZATION DENIED    Medication: ranitidine (ZANTAC) 150 MG tablet - P/A DENIED    Denial Date: 6/19/2019    Denial Rational:         Appeal Information:

## 2019-06-24 LAB
BACTERIA SPEC CULT: ABNORMAL
BACTERIA SPEC CULT: ABNORMAL
SPECIMEN SOURCE: ABNORMAL

## 2019-06-25 ENCOUNTER — HOSPITAL ENCOUNTER (INPATIENT)
Facility: CLINIC | Age: 24
LOS: 3 days | Discharge: HOME HEALTH | End: 2019-06-28
Attending: FAMILY MEDICINE | Admitting: FAMILY MEDICINE
Payer: COMMERCIAL

## 2019-06-25 ENCOUNTER — OFFICE VISIT (OUTPATIENT)
Dept: FAMILY MEDICINE | Facility: CLINIC | Age: 24
End: 2019-06-25
Payer: COMMERCIAL

## 2019-06-25 VITALS
RESPIRATION RATE: 18 BRPM | TEMPERATURE: 98.2 F | SYSTOLIC BLOOD PRESSURE: 115 MMHG | DIASTOLIC BLOOD PRESSURE: 72 MMHG | BODY MASS INDEX: 24.57 KG/M2 | HEIGHT: 70 IN | WEIGHT: 171.6 LBS | HEART RATE: 91 BPM | OXYGEN SATURATION: 96 %

## 2019-06-25 DIAGNOSIS — O99.320 SUBSTANCE ABUSE AFFECTING PREGNANCY, ANTEPARTUM (H): ICD-10-CM

## 2019-06-25 DIAGNOSIS — F19.10 SUBSTANCE ABUSE AFFECTING PREGNANCY, ANTEPARTUM (H): ICD-10-CM

## 2019-06-25 DIAGNOSIS — O09.90 SUPERVISION OF HIGH RISK PREGNANCY, ANTEPARTUM: Primary | ICD-10-CM

## 2019-06-25 PROBLEM — O42.90 PROM (PREMATURE RUPTURE OF MEMBRANES): Status: ACTIVE | Noted: 2019-06-25

## 2019-06-25 PROBLEM — Z36.89 ENCOUNTER FOR TRIAGE IN PREGNANT PATIENT: Status: ACTIVE | Noted: 2019-06-25

## 2019-06-25 PROCEDURE — 85049 AUTOMATED PLATELET COUNT: CPT | Performed by: FAMILY MEDICINE

## 2019-06-25 PROCEDURE — 25000128 H RX IP 250 OP 636: Performed by: FAMILY MEDICINE

## 2019-06-25 PROCEDURE — 85018 HEMOGLOBIN: CPT | Performed by: FAMILY MEDICINE

## 2019-06-25 PROCEDURE — 86900 BLOOD TYPING SEROLOGIC ABO: CPT | Performed by: FAMILY MEDICINE

## 2019-06-25 PROCEDURE — 25800030 ZZH RX IP 258 OP 636: Performed by: FAMILY MEDICINE

## 2019-06-25 PROCEDURE — 86780 TREPONEMA PALLIDUM: CPT | Performed by: FAMILY MEDICINE

## 2019-06-25 PROCEDURE — 36415 COLL VENOUS BLD VENIPUNCTURE: CPT | Performed by: FAMILY MEDICINE

## 2019-06-25 PROCEDURE — 12000001 ZZH R&B MED SURG/OB UMMC

## 2019-06-25 PROCEDURE — 86901 BLOOD TYPING SEROLOGIC RH(D): CPT | Performed by: FAMILY MEDICINE

## 2019-06-25 PROCEDURE — G0463 HOSPITAL OUTPT CLINIC VISIT: HCPCS

## 2019-06-25 RX ORDER — CITALOPRAM HYDROBROMIDE 20 MG/1
20 TABLET ORAL DAILY
Status: DISCONTINUED | OUTPATIENT
Start: 2019-06-26 | End: 2019-06-28 | Stop reason: HOSPADM

## 2019-06-25 RX ORDER — OXYTOCIN/0.9 % SODIUM CHLORIDE 30/500 ML
100-340 PLASTIC BAG, INJECTION (ML) INTRAVENOUS CONTINUOUS PRN
Status: DISCONTINUED | OUTPATIENT
Start: 2019-06-25 | End: 2019-06-26

## 2019-06-25 RX ORDER — NALOXONE HYDROCHLORIDE 0.4 MG/ML
.1-.4 INJECTION, SOLUTION INTRAMUSCULAR; INTRAVENOUS; SUBCUTANEOUS
Status: DISCONTINUED | OUTPATIENT
Start: 2019-06-25 | End: 2019-06-26

## 2019-06-25 RX ORDER — CARBOPROST TROMETHAMINE 250 UG/ML
250 INJECTION, SOLUTION INTRAMUSCULAR
Status: DISCONTINUED | OUTPATIENT
Start: 2019-06-25 | End: 2019-06-26

## 2019-06-25 RX ORDER — IBUPROFEN 800 MG/1
800 TABLET, FILM COATED ORAL
Status: DISCONTINUED | OUTPATIENT
Start: 2019-06-25 | End: 2019-06-26

## 2019-06-25 RX ORDER — LIDOCAINE 40 MG/G
CREAM TOPICAL
Status: DISCONTINUED | OUTPATIENT
Start: 2019-06-25 | End: 2019-06-26

## 2019-06-25 RX ORDER — METHYLERGONOVINE MALEATE 0.2 MG/ML
200 INJECTION INTRAVENOUS
Status: DISCONTINUED | OUTPATIENT
Start: 2019-06-25 | End: 2019-06-26

## 2019-06-25 RX ORDER — OXYCODONE AND ACETAMINOPHEN 5; 325 MG/1; MG/1
1 TABLET ORAL
Status: DISCONTINUED | OUTPATIENT
Start: 2019-06-25 | End: 2019-06-26

## 2019-06-25 RX ORDER — FENTANYL CITRATE 50 UG/ML
50-100 INJECTION, SOLUTION INTRAMUSCULAR; INTRAVENOUS
Status: DISCONTINUED | OUTPATIENT
Start: 2019-06-25 | End: 2019-06-26

## 2019-06-25 RX ORDER — ACETAMINOPHEN 325 MG/1
650 TABLET ORAL EVERY 4 HOURS PRN
Status: DISCONTINUED | OUTPATIENT
Start: 2019-06-25 | End: 2019-06-26

## 2019-06-25 RX ORDER — ONDANSETRON 2 MG/ML
4 INJECTION INTRAMUSCULAR; INTRAVENOUS EVERY 6 HOURS PRN
Status: DISCONTINUED | OUTPATIENT
Start: 2019-06-25 | End: 2019-06-26

## 2019-06-25 RX ORDER — SODIUM CHLORIDE, SODIUM LACTATE, POTASSIUM CHLORIDE, CALCIUM CHLORIDE 600; 310; 30; 20 MG/100ML; MG/100ML; MG/100ML; MG/100ML
INJECTION, SOLUTION INTRAVENOUS CONTINUOUS
Status: DISCONTINUED | OUTPATIENT
Start: 2019-06-25 | End: 2019-06-26

## 2019-06-25 RX ORDER — PENICILLIN G POTASSIUM 5000000 [IU]/1
5 INJECTION, POWDER, FOR SOLUTION INTRAMUSCULAR; INTRAVENOUS ONCE
Status: COMPLETED | OUTPATIENT
Start: 2019-06-25 | End: 2019-06-26

## 2019-06-25 RX ORDER — OXYTOCIN 10 [USP'U]/ML
10 INJECTION, SOLUTION INTRAMUSCULAR; INTRAVENOUS
Status: DISCONTINUED | OUTPATIENT
Start: 2019-06-25 | End: 2019-06-26

## 2019-06-25 RX ADMIN — SODIUM CHLORIDE, POTASSIUM CHLORIDE, SODIUM LACTATE AND CALCIUM CHLORIDE: 600; 310; 30; 20 INJECTION, SOLUTION INTRAVENOUS at 23:50

## 2019-06-25 RX ADMIN — PENICILLIN G POTASSIUM 5 MILLION UNITS: 5000000 POWDER, FOR SOLUTION INTRAMUSCULAR; INTRAPLEURAL; INTRATHECAL; INTRAVENOUS at 23:50

## 2019-06-25 ASSESSMENT — MIFFLIN-ST. JEOR
SCORE: 1608.62
SCORE: 1595.03

## 2019-06-25 NOTE — PROGRESS NOTES
"Return OB visit  35-39 weeks    Subjective:   Blaire is a 24 year old  female at 37w0d who returns for prenatal care. LIEN 2019.  - Concerns today: none  Patient eager for delivery. Reports she was born ~37 to 38 weeks.   - Patient reports no vaginal bleeding, no leakage of fluid. [place] fontaine. Fetal movement is present.   - No vaginal discharge. No dysuria.   - No headache, vision changes, lower extremity swelling, upper abdominal pain, chest pain, shortness of breath.   - Mood has been \"fine\".    Objective:   /72 (BP Location: Left arm, Patient Position: Sitting, Cuff Size: Adult Regular)   Pulse 91   Temp 98.2  F (36.8  C) (Oral)   Resp 18   Ht 1.77 m (5' 9.69\")   Wt 77.8 kg (171 lb 9.6 oz)   LMP 2018 (Approximate)   SpO2 96%   BMI 24.85 kg/m     Const: No distress  Abd: Gravid.   See flowsheet for FH, FHTs, fetal presentation, EFW, edema.     Prenatal labs:   Hemoglobin   Date Value Ref Range Status   2019 11.7 11.7 - 15.7 g/dL Final       Assessment & plan:   IUP at 37w0d weeks by LMP consistent with first trimester ultrasound.     - Pregnancy complicated by: depression on celexa, cannabis use throughout pregnancy   - Prenatal labs reviewed. Patient did pass 1 hour GCT. Fetal survey showed no abnormalities requiring follow up ultrasound.   - GBS obtained at prior visit and is positive.    - Patient is measuring appropriately for gestational age. Pregnancy weight gain has been 7.076 kg (15 lb 9.6 oz) to date, which is adequate.   - Patient plans to breast feed and use IUD, most considering Mirena, for contraception after delivery.   - Discussed circumcision: yes, desires  - Discussed  care & plan for vaccinations.   - Provided counseling regarding labor signs, hospital readiness (transportation, bags packed), pain control options during labor (desires gas, unsure for epidural).   - Patient will continue taking prenatal vitamins and avoiding cigarettes & alcohol. Tdap " and flu shot have been given.     -Breastfeeding education provided:  - Supporting a non-medicated birth  - Skin to Skin  - Non-separation of mother and baby    - Return to clinic in 1 week.    - Specific concerns addressed today:   Substance abuse affecting pregnancy, antepartum - reports decreasing frequency of marijuana usage with most recent use last Friday. Continue to encourage abstaining from use. UDS continues to be positive for cannabinoids     Options for treatment and follow-up care were reviewed with the patient and/or guardian. Blaire Owusu and/or guardian engaged in the decision making process and verbalized understanding of the options discussed and agreed with the final plan.    Margaret Tierney MD  [facility] Resident PGY-2  [phone]    Those present during this appointment were: patient and myself

## 2019-06-26 ENCOUNTER — ANESTHESIA (OUTPATIENT)
Dept: OBGYN | Facility: CLINIC | Age: 24
End: 2019-06-26
Payer: COMMERCIAL

## 2019-06-26 ENCOUNTER — ANESTHESIA EVENT (OUTPATIENT)
Dept: OBGYN | Facility: CLINIC | Age: 24
End: 2019-06-26
Payer: COMMERCIAL

## 2019-06-26 LAB
ABO + RH BLD: NORMAL
ABO + RH BLD: NORMAL
AMPHETAMINES UR QL SCN: NEGATIVE
CANNABINOIDS UR QL: ABNORMAL
COCAINE UR QL: NEGATIVE
CREAT UR-MCNC: 87 MG/DL
HGB BLD-MCNC: 10.9 G/DL (ref 11.7–15.7)
OPIATES UR QL SCN: NEGATIVE
PCP UR QL SCN: NEGATIVE
PLATELET # BLD AUTO: 318 10E9/L (ref 150–450)
SPECIMEN EXP DATE BLD: NORMAL
T PALLIDUM AB SER QL: NONREACTIVE

## 2019-06-26 PROCEDURE — 3E0R3BZ INTRODUCTION OF ANESTHETIC AGENT INTO SPINAL CANAL, PERCUTANEOUS APPROACH: ICD-10-PCS | Performed by: ADVANCED PRACTICE MIDWIFE

## 2019-06-26 PROCEDURE — 25000125 ZZHC RX 250: Performed by: STUDENT IN AN ORGANIZED HEALTH CARE EDUCATION/TRAINING PROGRAM

## 2019-06-26 PROCEDURE — 72200001 ZZH LABOR CARE VAGINAL DELIVERY SINGLE

## 2019-06-26 PROCEDURE — 25000128 H RX IP 250 OP 636: Performed by: ANESTHESIOLOGY

## 2019-06-26 PROCEDURE — 80307 DRUG TEST PRSMV CHEM ANLYZR: CPT | Performed by: FAMILY MEDICINE

## 2019-06-26 PROCEDURE — 25000125 ZZHC RX 250: Performed by: ANESTHESIOLOGY

## 2019-06-26 PROCEDURE — 25000128 H RX IP 250 OP 636: Performed by: FAMILY MEDICINE

## 2019-06-26 PROCEDURE — 25800030 ZZH RX IP 258 OP 636: Performed by: FAMILY MEDICINE

## 2019-06-26 PROCEDURE — 80349 CANNABINOIDS NATURAL: CPT | Performed by: FAMILY MEDICINE

## 2019-06-26 PROCEDURE — 12000001 ZZH R&B MED SURG/OB UMMC

## 2019-06-26 PROCEDURE — 25000132 ZZH RX MED GY IP 250 OP 250 PS 637: Performed by: FAMILY MEDICINE

## 2019-06-26 PROCEDURE — 25000132 ZZH RX MED GY IP 250 OP 250 PS 637: Performed by: STUDENT IN AN ORGANIZED HEALTH CARE EDUCATION/TRAINING PROGRAM

## 2019-06-26 RX ORDER — MISOPROSTOL 200 UG/1
TABLET ORAL
Status: DISCONTINUED
Start: 2019-06-26 | End: 2019-06-26 | Stop reason: HOSPADM

## 2019-06-26 RX ORDER — OXYTOCIN/0.9 % SODIUM CHLORIDE 30/500 ML
100 PLASTIC BAG, INJECTION (ML) INTRAVENOUS CONTINUOUS
Status: DISCONTINUED | OUTPATIENT
Start: 2019-06-26 | End: 2019-06-28 | Stop reason: HOSPADM

## 2019-06-26 RX ORDER — OXYTOCIN/0.9 % SODIUM CHLORIDE 30/500 ML
PLASTIC BAG, INJECTION (ML) INTRAVENOUS
Status: DISCONTINUED
Start: 2019-06-26 | End: 2019-06-26 | Stop reason: HOSPADM

## 2019-06-26 RX ORDER — LIDOCAINE HYDROCHLORIDE 10 MG/ML
INJECTION, SOLUTION EPIDURAL; INFILTRATION; INTRACAUDAL; PERINEURAL
Status: DISCONTINUED
Start: 2019-06-26 | End: 2019-06-26 | Stop reason: HOSPADM

## 2019-06-26 RX ORDER — LANOLIN 100 %
OINTMENT (GRAM) TOPICAL
Status: DISCONTINUED | OUTPATIENT
Start: 2019-06-26 | End: 2019-06-28 | Stop reason: HOSPADM

## 2019-06-26 RX ORDER — NALOXONE HYDROCHLORIDE 0.4 MG/ML
.1-.4 INJECTION, SOLUTION INTRAMUSCULAR; INTRAVENOUS; SUBCUTANEOUS
Status: DISCONTINUED | OUTPATIENT
Start: 2019-06-26 | End: 2019-06-27

## 2019-06-26 RX ORDER — AMOXICILLIN 250 MG
1 CAPSULE ORAL 2 TIMES DAILY
Status: DISCONTINUED | OUTPATIENT
Start: 2019-06-26 | End: 2019-06-28 | Stop reason: HOSPADM

## 2019-06-26 RX ORDER — NALBUPHINE HYDROCHLORIDE 10 MG/ML
2.5-5 INJECTION, SOLUTION INTRAMUSCULAR; INTRAVENOUS; SUBCUTANEOUS EVERY 6 HOURS PRN
Status: DISCONTINUED | OUTPATIENT
Start: 2019-06-26 | End: 2019-06-28 | Stop reason: HOSPADM

## 2019-06-26 RX ORDER — OXYTOCIN 10 [USP'U]/ML
10 INJECTION, SOLUTION INTRAMUSCULAR; INTRAVENOUS
Status: DISCONTINUED | OUTPATIENT
Start: 2019-06-26 | End: 2019-06-28 | Stop reason: HOSPADM

## 2019-06-26 RX ORDER — ACETAMINOPHEN 325 MG/1
650 TABLET ORAL EVERY 4 HOURS PRN
Status: DISCONTINUED | OUTPATIENT
Start: 2019-06-26 | End: 2019-06-28 | Stop reason: HOSPADM

## 2019-06-26 RX ORDER — BISACODYL 10 MG
10 SUPPOSITORY, RECTAL RECTAL DAILY PRN
Status: DISCONTINUED | OUTPATIENT
Start: 2019-06-28 | End: 2019-06-28 | Stop reason: HOSPADM

## 2019-06-26 RX ORDER — TERBUTALINE SULFATE 1 MG/ML
0.25 INJECTION, SOLUTION SUBCUTANEOUS
Status: DISCONTINUED | OUTPATIENT
Start: 2019-06-26 | End: 2019-06-26

## 2019-06-26 RX ORDER — OXYTOCIN/0.9 % SODIUM CHLORIDE 30/500 ML
340 PLASTIC BAG, INJECTION (ML) INTRAVENOUS CONTINUOUS PRN
Status: DISCONTINUED | OUTPATIENT
Start: 2019-06-26 | End: 2019-06-28 | Stop reason: HOSPADM

## 2019-06-26 RX ORDER — EPHEDRINE SULFATE 50 MG/ML
INJECTION, SOLUTION INTRAMUSCULAR; INTRAVENOUS; SUBCUTANEOUS
Status: DISCONTINUED
Start: 2019-06-26 | End: 2019-06-26 | Stop reason: HOSPADM

## 2019-06-26 RX ORDER — HYDROCORTISONE 2.5 %
CREAM (GRAM) TOPICAL 3 TIMES DAILY PRN
Status: DISCONTINUED | OUTPATIENT
Start: 2019-06-26 | End: 2019-06-28 | Stop reason: HOSPADM

## 2019-06-26 RX ORDER — AMOXICILLIN 250 MG
2 CAPSULE ORAL 2 TIMES DAILY
Status: DISCONTINUED | OUTPATIENT
Start: 2019-06-26 | End: 2019-06-28 | Stop reason: HOSPADM

## 2019-06-26 RX ORDER — MISOPROSTOL 200 UG/1
400 TABLET ORAL
Status: DISCONTINUED | OUTPATIENT
Start: 2019-06-26 | End: 2019-06-28 | Stop reason: HOSPADM

## 2019-06-26 RX ORDER — LIDOCAINE HYDROCHLORIDE AND EPINEPHRINE 15; 5 MG/ML; UG/ML
INJECTION, SOLUTION EPIDURAL PRN
Status: DISCONTINUED | OUTPATIENT
Start: 2019-06-26 | End: 2019-06-28 | Stop reason: HOSPADM

## 2019-06-26 RX ORDER — IBUPROFEN 600 MG/1
600 TABLET, FILM COATED ORAL EVERY 6 HOURS PRN
Status: DISCONTINUED | OUTPATIENT
Start: 2019-06-26 | End: 2019-06-28 | Stop reason: HOSPADM

## 2019-06-26 RX ORDER — OXYTOCIN/0.9 % SODIUM CHLORIDE 30/500 ML
1-24 PLASTIC BAG, INJECTION (ML) INTRAVENOUS CONTINUOUS
Status: DISCONTINUED | OUTPATIENT
Start: 2019-06-26 | End: 2019-06-26

## 2019-06-26 RX ORDER — EPHEDRINE SULFATE 50 MG/ML
5 INJECTION, SOLUTION INTRAMUSCULAR; INTRAVENOUS; SUBCUTANEOUS
Status: DISCONTINUED | OUTPATIENT
Start: 2019-06-26 | End: 2019-06-28 | Stop reason: HOSPADM

## 2019-06-26 RX ORDER — OXYTOCIN 10 [USP'U]/ML
INJECTION, SOLUTION INTRAMUSCULAR; INTRAVENOUS
Status: DISCONTINUED
Start: 2019-06-26 | End: 2019-06-26 | Stop reason: HOSPADM

## 2019-06-26 RX ORDER — NALOXONE HYDROCHLORIDE 0.4 MG/ML
.1-.4 INJECTION, SOLUTION INTRAMUSCULAR; INTRAVENOUS; SUBCUTANEOUS
Status: DISCONTINUED | OUTPATIENT
Start: 2019-06-26 | End: 2019-06-28 | Stop reason: HOSPADM

## 2019-06-26 RX ORDER — FENTANYL/BUPIVACAINE/NS/PF 2-1250MCG
PLASTIC BAG, INJECTION (ML) INJECTION
Status: COMPLETED
Start: 2019-06-26 | End: 2019-06-26

## 2019-06-26 RX ADMIN — IBUPROFEN 600 MG: 600 TABLET ORAL at 20:42

## 2019-06-26 RX ADMIN — Medication 2.5 MILLION UNITS: at 09:17

## 2019-06-26 RX ADMIN — Medication 10 ML/HR: at 01:34

## 2019-06-26 RX ADMIN — ONDANSETRON 4 MG: 2 INJECTION INTRAMUSCULAR; INTRAVENOUS at 12:42

## 2019-06-26 RX ADMIN — SENNOSIDES AND DOCUSATE SODIUM 1 TABLET: 8.6; 5 TABLET ORAL at 20:42

## 2019-06-26 RX ADMIN — Medication 2 MILLI-UNITS/MIN: at 10:16

## 2019-06-26 RX ADMIN — Medication 2.5 MILLION UNITS: at 13:35

## 2019-06-26 RX ADMIN — SODIUM CHLORIDE, POTASSIUM CHLORIDE, SODIUM LACTATE AND CALCIUM CHLORIDE: 600; 310; 30; 20 INJECTION, SOLUTION INTRAVENOUS at 01:40

## 2019-06-26 RX ADMIN — IBUPROFEN 600 MG: 600 TABLET ORAL at 14:47

## 2019-06-26 RX ADMIN — SODIUM CHLORIDE, POTASSIUM CHLORIDE, SODIUM LACTATE AND CALCIUM CHLORIDE 1000 ML: 600; 310; 30; 20 INJECTION, SOLUTION INTRAVENOUS at 01:03

## 2019-06-26 RX ADMIN — LIDOCAINE HYDROCHLORIDE,EPINEPHRINE BITARTRATE 3 ML: 15; .005 INJECTION, SOLUTION EPIDURAL; INFILTRATION; INTRACAUDAL; PERINEURAL at 01:29

## 2019-06-26 RX ADMIN — SODIUM CHLORIDE, POTASSIUM CHLORIDE, SODIUM LACTATE AND CALCIUM CHLORIDE: 600; 310; 30; 20 INJECTION, SOLUTION INTRAVENOUS at 11:36

## 2019-06-26 RX ADMIN — Medication 2.5 MILLION UNITS: at 05:34

## 2019-06-26 RX ADMIN — Medication 100 ML/HR: at 13:50

## 2019-06-26 RX ADMIN — CITALOPRAM HYDROBROMIDE 20 MG: 20 TABLET ORAL at 07:55

## 2019-06-26 NOTE — PROGRESS NOTES
"[facility]  2019 6:41 AM  Date of service: 2019.    SUBJECTIVE:  Doing well. Good pain control now.  Feels occasional pressure in her bottom, no urge to push yet.  Passing clear fluid. No concerns.    OBJECTIVE:   Patient Vitals for the past 8 hrs:   BP Temp Temp src Resp SpO2 Height Weight   19 0610 120/71 -- -- -- 98 % -- --   19 0540 -- -- -- 18 98 % -- --   19 0510 99/55 98.5  F (36.9  C) Oral 18 98 % -- --   19 0415 120/76 -- -- 18 97 % -- --   19 0345 -- -- -- -- 98 % -- --   19 0311 115/68 97.9  F (36.6  C) Oral 18 98 % -- --   19 0246 114/67 -- -- 18 99 % -- --   19 0211 110/56 -- -- 18 -- -- --   19 0208 116/60 98.1  F (36.7  C) Oral 18 -- -- --   195 125/73 -- -- 18 99 % -- --   19 0157 118/61 -- -- 18 97 % -- --   19 0152 130/79 -- -- 18 97 % -- --   197 117/64 -- -- 18 99 % -- --   19 0141 127/67 -- -- 18 99 % -- --   19 0139 130/65 -- -- 18 99 % -- --   197 121/60 -- -- 18 98 % -- --   195 131/59 -- -- 18 99 % -- --   193 138/81 -- -- 18 99 % -- --   19 143/82 -- -- 18 99 % -- --   06/26/19 0129 138/78 97.9  F (36.6  C) Oral 18 99 % -- --   19 -- -- -- -- -- 1.753 m (5' 9\") 77.6 kg (171 lb)   19 120/57 98.5  F (36.9  C) Oral 20 -- -- --     Gen: no apparent distress, resting comfortably  Abd: Gravid.  EFW: 3000g    Sterile Vaginal Exam:  Membranes: PROM  Cervix: 9/100/0   Consistency: soft  Presentation:Vertex    FHT: Baseline 140 bpm. Variability is  moderate (5-25 bpm),  Accelerations are Present, intermittent variable decelerations are present  TOCO: Contractions every 2-4 minutes.  Tracing is Category 2        .    ASSESSMENT and PLAN:  Blaire Owusu is a 24 year old  at 37w1d in active labor.   Patient Active Problem List   Diagnosis     ADHD (attention deficit hyperactivity " disorder)     Deliberate self-cutting     Depressive disorder     MILES (generalized anxiety disorder)     Major depression in remission (H)     Menorrhagia     Mixed anxiety depressive disorder     Oppositional defiant disorder     Parent/child conflict     Substance abuse affecting pregnancy, antepartum     Risk of  labor     Supervision of high risk pregnancy, antepartum     Encounter for triage in pregnant patient     PROM (premature rupture of membranes)       1.  Labor: spontaneous. Progressing well.  Likely will labor down well.       Continue expectant management.  Plan to reassess in two hours.   2.  Fetal Heart Rate Tracing: category two, occasional bassam decels  3.  GBS positive.  Antibiotics are indicated, has received 2 doses PCN.  4.  Pain Management: epidural working well  5.  THC in pregnancy - f/u with SW after delivery (pt aware), will test cord blood    # Pain Assessment:  Current Pain Score 2019   Patient currently in pain? denies   Pain score (0-10) -   - Please see the plan for pain management as documented above, good control with epidural currently        Payal Vanessa MD

## 2019-06-26 NOTE — PLAN OF CARE
FHT's up to 170's when patient started having emesis ongoing each time she was having a contraction with pushing. Provider aware. Tennille candelaria call to room at 1340 for delsaragabriel, she arrived at 1343. Pt delivered at 1346, baby boy. Baby went to NICU for respiratory distress, requiring cpap. See delivery summary

## 2019-06-26 NOTE — L&D DELIVERY NOTE
OB Vaginal Delivery Note    Blaire Owusu MRN# [id number]   Age: 24 year old YOB: 1995       GA: 37w1d  GP:   Labor Complications: None   EBL:    mL  QBL:    Delivery Type: Vaginal, Spontaneous   ROM to Delivery Time: 15h 15m   Weight:      1 Minute 5 Minute 10 Minute   Apgar Totals: 7    4    8        Delivery Details:  The patient arrived in active labor, received an epidural for pain management.  Blaire Owusu, a 24 year old  female delivered a viable infant with apgars of 7   and 4  , and 8 at 10 min,  brought to NICU. Delivery was via vaginal, spontaneous  to a sterile field under epidural anesthesia. Infant delivered in vertex  left  occiput     position. Anterior and posterior shoulders delivered without difficulty. The cord was clamped, cut twice and 3 vessels  were noted. Cord blood was obtained in routine fashion with the following disposition: lab .  Section of cord sent for cord gases given last 10 min of tachycardia    Cord complications: none   Placenta delivered at 2019  1:55 PM . Placental disposition was Hospital disposal . Fundal massage performed and fundus found to be firm.     Episiotomy: none    Perineum, vagina, cervix were inspected, and the following lacerations were noted:   Perineal lacerations: none   periurethral laceration: bilateral , very shallow hemostatic tearing present, discussed with patient, no repair. Encouraged tub soak     Excellent hemostasis was noted. Needle count correct. Infant requiring NICU support with CPAP with increased respiratory effort and was transferred to NICU for further cares.   Tennille Payan, WESLY CNM       Labor Event Times    Labor onset date:  19 Onset time:   1:00 AM   Dilation complete date:  19 Complete time:  11:52 AM   Start pushing date/time:  2019 1152      Labor Events     labor?:  No  Labor Type:  Spontaneous  Predominate monitoring during 1st stage:  continuous electronic fetal  monitoring     Antibiotics received during labor?:  Yes  Reason for Antibiotics:  GBS  Antibiotics received for GBS:  Penicillin  Antibiotics Given (GBS):  Greater than 4 hours prior to delivery     Rupture date/time: 190   Rupture type:  Spontaneous rupture of membranes occuring during spontaneous labor or augmentation  Fluid color:  Clear, Pink  Fluid odor:  Normal     1:1 continuous labor support provided by?:  RN Labor partogram used?:  no      Delivery/Placenta Date and Time    Delivery Date:  19 Delivery Time:   1:45 PM   Placenta Date/Time:  2019  1:55 PM     Vaginal Counts     Initial count performed by 2 team members:   Two Team Members   Dr Niall Jose RN       Needles Suture [employer] Sponges Instruments   Initial counts 2  5    Added to count 0 0 0    Final counts 2 0 5    Placed during labor Accounted for at the end of labor   NA NA   NA NA   NA NA    Final count performed by 2 team members:   Two Team Members   Dr Niall Jose RN      Final count correct?:  Yes     Apgars    Living status:  Living   1 Minute 5 Minute 10 Minute 15 Minute 20 Minute   Skin color: 0  0  1      Heart rate: 2  1  2      Reflex irritability: 2  1  2      Muscle tone: 1  1  2      Respiratory effort: 2  1  1      Total: 7  4  8      Apgars assigned by:  1 MINUTE LYNETTE JOSE RN 5 MINUTE,NICU     Cord    Vessels:  3 Vessels Complications:  None   Cord Blood Disposition:  Lab Gases Sent?:  Yes      [place] Resuscitation    Methods:  Oxygen, Suctioning, Oximetry, Temp Skin Control, Robert Puff  [place] Care at Delivery:  Infant delivered to maternal abdomen, with spontaneous respirations.  Infant with weak cry, stimulated by RN, bulb suctioning to clear secretions. Dusky color, cord clamped and cut, brought to warmer.  Infant with lusty cry following stimulation, began retracting and grunting x3 respirations.  Decrease in tone, became apneic.  Infant code blue called at 2 minutes 20 seconds of life, heart  rate auscultated ~40bpm. PPV started.  See NNP note for further care.    Giuliana Moreland, RN    Responded to code blue. Term infant on warmer at 3 minutes of life with minimal tone and color, nurses with shy puff and pulse oximeter (not reading) in place. Heart rate upon first ascultation ~40 bpm. Readjusted mask, suctioned mouth, and started PPV 20/6 via neopuff, fi02 increased to 100%, pulse oximeter not picking up, heart rate improved to >100 bpm within the first minute of arrival (4 minutes of life).  Spontaneous breathing started at 5-6 minutes of life. PPV stopped and CPAP +6 via neopuff continued. fi02 titrated down to 40%, with saturations 85%. Baby transferred to the NICU for further management of respiratory distress.     This resuscitation and all procedures were performed by this provider/author of this note.   WESLY Williamson, CNP-BC 6/26/2019 2:51 PM    Output in Delivery Room:  Voided     Skin to Skin and Feeding Plan    Skin to skin initiation date/time: 1/6/1841    Skin to skin with:  Mother  Skin to skin end date/time: 1/6/1841    How do you plan to feed your baby:  Breastfeeding     Labor Events and Shoulder Dystocia    Fetal Tracing Prior to Delivery:  Category 2  Fetal Tracing Comments:  tachycardia for 10 min before delivery   Shoulder dystocia present?:  Neg     Delivery (Maternal) (Provider to Complete) ([id number])    Episiotomy:  None  Perineal lacerations:  None    Periurethral laceration:  bilateral Repaired?:  No   Vaginal laceration?:  No    Cervical laceration?:  No       Blood Loss  Mother: Blaire Owusu #[id number]   Start of Mother's Information    IO Blood Loss  06/26/19 0100 - 06/26/19 1658    Total QBL Blood Loss (mL) Hospital Encounter 415 mL    Total  415 mL         End of Mother's Information  Mother: Blaire Owusu #[id number]         Delivery - Provider to Complete ([id number])    Delivering clinician:  Tennille Payan APRN CNM  CNM Care:  Any CNM care in  labor  Attempted Delivery Types (Choose all that apply):  Spontaneous Vaginal Delivery  Delivery Type (Choose the 1 that will go to the Birth History):  Vaginal, Spontaneous   Other personnel:   Provider Role   Margaret Tierney MD Resident         Placenta    Delayed Cord Clamping:  Done  Date/Time:  6/26/2019  1:55 PM  Removal:  Spontaneous  Disposition:  Hospital disposal     Anesthesia    Method:  Nitrous Oxide, Epidural  Cervical dilation at placement:  4-7   Analgesic:   BIRTH HISTORY: ANALGESIC   FENTANYL CITRATE   BUPIVACAINE HCL IJ         Presentation and Position    Presentation:  Vertex  Position:  Left Occiput

## 2019-06-26 NOTE — PROGRESS NOTES
"[facility]  Intrapartum Progress Note  2019 1:15 AM  Date of service: 2019.    SUBJECTIVE:  Uncomfortable with CTX, attempting to use nitrous. Asking for epidural.    OBJECTIVE:   Patient Vitals for the past 8 hrs:   BP Temp Temp src Resp Height Weight   19 2259 -- -- -- -- 1.753 m (5' 9\") 77.6 kg (171 lb)   19 2253 120/57 98.5  F (36.9  C) Oral 20 -- --     Gen: uncomfortable, writhing in the bed    Sterile Vaginal Exam:  Membranes: PROM  Cervix: 5/80/-1   Consistency: soft  Presentation:Cephalic    FHT: Baseline 140 bpm. Variability is moderate (5-25 bpm),  Accelerations are Present, rare variable decels noted    TOCO: Contractions every 2-3 minutes and palpate strong.  Tracing is Category 2        .    ASSESSMENT and PLAN:  Blaire Owusu is a 24 year old  at 37w1d in active labor.   Patient Active Problem List   Diagnosis     ADHD (attention deficit hyperactivity disorder)     Deliberate self-cutting     Depressive disorder     MILES (generalized anxiety disorder)     Major depression in remission (H)     Menorrhagia     Mixed anxiety depressive disorder     Oppositional defiant disorder     Parent/child conflict     Substance abuse affecting pregnancy, antepartum     Risk of  labor     Supervision of high risk pregnancy, antepartum     Encounter for triage in pregnant patient     PROM (premature rupture of membranes)       1.  Labor: spontaneous, SROM. Active labor and good progress/       Continue expectant management.  Plan to reassess in two hours.   2.  Fetal Heart Rate Tracing: category two (rare variable decels)  3.  GBS positive.  Antibiotics are indicated.  4.  Pain Management: desires epidural, fluid bolus initiated and anesthesia aware  5.  THC use in pregnancy - follow per protocol (baby to have cord tox screen), SW    # Pain Assessment:  Current Pain Score 2019   Patient currently in pain? yes   Pain score (0-10) -   - Blaire is experiencing pain " due to labor. Pain management was discussed with Blaire and her family and the plan was created in a collaborative fashion.  David's response to the current recommendations: engaged  - Please see the plan for pain management as documented above        Payal Vanessa MD

## 2019-06-26 NOTE — PROGRESS NOTES
Pt uncomfortable, cervix anterior lip, straight cath and then complete, started pushing.anticipate

## 2019-06-26 NOTE — PROGRESS NOTES
[facility]  Intrapartum Progress Note  2019 10:26 AM  Date of service: 2019.    SUBJECTIVE:  Doing well. Anterior lip noted. Bladder was emptied with lip reduced. Started pushing. With minimal descent noted, anterior lip noted again on exam, though reducible. Contractions spacing to every 5 minutes. Patient noting pressure and discomfort in her back.     OBJECTIVE:   Patient Vitals for the past 8 hrs:   BP Temp Temp src Resp SpO2   19 0920 128/74 98.1  F (36.7  C) Oral 16 --   19 0750 -- -- -- -- 98 %   19 0720 120/70 98.5  F (36.9  C) Oral 18 97 %   19 0635 -- -- -- -- 98 %   19 0610 120/71 -- -- -- 98 %   19 0540 -- -- -- 18 98 %   19 0510 99/55 98.5  F (36.9  C) Oral 18 98 %   19 0415 120/76 -- -- 18 97 %   19 0345 -- -- -- -- 98 %   19 0311 115/68 97.9  F (36.6  C) Oral 18 98 %   19 0246 114/67 -- -- 18 99 %     Gen: no apparent distress, resting comfortably  Abd: Gravid.  EFW: 3300g    Sterile Vaginal Exam:  Membranes: PROM  Cervix: 10 w/lip/100/+1   Presentation:Cephalic    FHT: Baseline 140 bpm. Variability is  moderate (5-25 bpm),  Accelerations are Present, decelerations are Absent  TOCO: Contractions every 2.5-5 minutes.  Tracing is Category 1        .    ASSESSMENT and PLAN:  Blaire Owusu is a 24 year old  at 37w1d in active labor.   Patient Active Problem List   Diagnosis     ADHD (attention deficit hyperactivity disorder)     Deliberate self-cutting     Depressive disorder     MILES (generalized anxiety disorder)     Major depression in remission (H)     Menorrhagia     Mixed anxiety depressive disorder     Oppositional defiant disorder     Parent/child conflict     Substance abuse affecting pregnancy, antepartum     Risk of  labor     Supervision of high risk pregnancy, antepartum     Encounter for triage in pregnant patient     PROM (premature rupture of membranes)       1.  Labor: spontaneous.  Active labor with noted reducible anterior lip. Will start pitocin and labor down on side. Recheck in 30 minutes. If complete will push, otherwise will reassess.    2.  Fetal Heart Rate Tracing: category one  3.  GBS positive.  Antibiotics are indicated and receiving Q4H.  4.  Pain Management: epidural     # Pain Assessment:  Current Pain Score 6/26/2019   Patient currently in pain? denies   Pain score (0-10) -   - Blaire is experiencing pain due to active labor. Pain management was discussed with Blaire and her significant other and the plan was created in a collaborative fashion.  Blaire's response to the current recommendations: agreeable  - Please see the plan for pain management as documented above        Margaret Tierney MD  [facility] Resident PGY-3  [phone]

## 2019-06-26 NOTE — PROGRESS NOTES
[facility]  Intrapartum Progress Note  2019 4:41 AM  Date of service: 2019.    SUBJECTIVE:  Resting comfortably per RN after receiving new bolus through epidural with exchange of machines (hadn't been working).  No concerns.        OBJECTIVE:   Patient Vitals for the past 8 hrs:   BP Temp Temp src Resp SpO2 Height Weight   19 0415 120/76 -- -- 18 97 % -- --   19 0345 -- -- -- -- 98 % -- --   19 0311 115/68 -- -- 18 98 % -- --   19 0246 114/67 -- -- 18 99 % -- --   19 0211 110/56 -- -- 18 -- -- --   19 0208 116/60 -- -- 18 -- -- --   19 0205 125/73 -- -- 18 99 % -- --     FHT: Baseline 140 bpm. Variability is  moderate (5-25 bpm), accelerations are present, decelerations are absent  TOCO: Contractions every 2-3 minutes  Tracing is Category 1        .    ASSESSMENT and PLAN:  Blaire Owusu is a 24 year old  at 37w1d in active labor after PROM at home.   Patient Active Problem List   Diagnosis     ADHD (attention deficit hyperactivity disorder)     Deliberate self-cutting     Depressive disorder     MILES (generalized anxiety disorder)     Major depression in remission (H)     Menorrhagia     Mixed anxiety depressive disorder     Oppositional defiant disorder     Parent/child conflict     Substance abuse affecting pregnancy, antepartum     Risk of  labor     Supervision of high risk pregnancy, antepartum     Encounter for triage in pregnant patient     PROM (premature rupture of membranes)       1.  Labor: spontaneous. Progressing well.         Continue expectant management.  Plan to reassess in two hours.   2.  Fetal Heart Rate Tracing: category one  3.  GBS positive.  Antibiotics are indicated, has received one dose of PCN so far.  4.  Pain Management: epidural in place  5.  THC use: +THC on UDS  in clinic    # Pain Assessment:  Current Pain Score 2019   Patient currently in pain? sleeping: patient not able to self report   Pain  score (0-10) -         Payal Vanessa MD

## 2019-06-26 NOTE — PROVIDER NOTIFICATION
Dr Johnston assessing pushing, persistant anterior lip, consider pitocin augmentation. Patient agrees.

## 2019-06-26 NOTE — PROGRESS NOTES
MD at bedside, reviewed EFM tracing and pt status. Also reviewing plan of care for delivery. Anticipate

## 2019-06-26 NOTE — PLAN OF CARE
Pt slept 2hrs, comfortable with epidural, will try to continue resting.   VSS afebrile, straight cath'd for 300cc @ 0530.  Will continue to monitor, pt instructed to call for cares/concerns. Pt agrees with plan.

## 2019-06-26 NOTE — ANESTHESIA PROCEDURE NOTES
Epidural Procedure Note    Staff:     Anesthesiologist:  Kenya Rosario MD  Location: OB   Pre-procedure checklist:   patient identified, IV checked, site marked, risks and benefits discussed, informed consent, monitors and equipment checked, pre-op evaluation, at physician/surgeon's request and post-op pain management      Correct Patient: Yes      Correct Position: Yes      Correct Site: Yes      Correct Procedure: Yes      Correct Laterality:  Yes    Site Marked:  Yes  Procedure:     Procedure:  Epidural catheter    ASA:  2    Position:  Sitting    Sterile Prep: chloraprep      Insertion site:  L3-4    Local skin infiltration:  1% lidocaine    amount (mL):  3    Approach:  Midline    Needle gauge (G):  17    Needle Length (in):  3.5    Block Needle Type:  Touhy    Injection Technique:  LORT saline    BAM at (cm):  4    Attempts:  1    Redirects:  0    Catheter gauge (G):  19    Threaded to cm at skin:  8    Threaded in epidural space (cm):  4    Paresthesias:  No    Aspiration negative for Heme or CSF: Yes      Test dose (mL):  3     Local anesthetic:  Lidocaine 1.5% w/ 1:200,000 epinephrine    Test dose time:  01:29    Test dose negative for signs of intravascular, subdural or intrathecal injection: Yes

## 2019-06-26 NOTE — PLAN OF CARE
Mother transferred to postpartum unit at 1545 when room was ready  via wheelchair.  Bedside report given to Karine Mason RN who assumes care.  Pt had visited the NICU epdro while in recovery period in labor and delivery. Call light in reach.

## 2019-06-26 NOTE — ANESTHESIA PREPROCEDURE EVALUATION
Anesthesia Pre-Procedure Evaluation    Patient: Blaire Owusu   MRN:     [id number] Gender:   female   Age:    24 year old :      1995        Preoperative Diagnosis: * No surgery found *        Past Medical History:   Diagnosis Date     Anxiety      Depression      Depressive disorder      Eating disorder Bulemia     Mutilations, self     Cutting      Past Surgical History:   Procedure Laterality Date     DENTAL SURGERY Bilateral     [place] teeth removal, all 4          Anesthesia Evaluation       history and physical reviewed .             ROS/MED HX    ENT/Pulmonary:  - neg pulmonary ROS     Neurologic:  - neg neurologic ROS     Cardiovascular:  - neg cardiovascular ROS       METS/Exercise Tolerance:     Hematologic:         Musculoskeletal:         GI/Hepatic:  - neg GI/hepatic ROS       Renal/Genitourinary:         Endo:         Psychiatric:         Infectious Disease:         Malignancy:         Other:                     [facility] AN PHYSICAL EXAM    Lab Results   Component Value Date    WBC 6.1 2011    HGB 10.9 (L) 2019    HCT 34.1 (L) 2011     2019     2011    POTASSIUM 3.7 2011    CHLORIDE 107 2011    CO2 26 2011    BUN 11 2011    CR 0.58 2011    GLC 80 2011    MUKUND 8.4 (L) 2011    ALBUMIN 3.8 (L) 2011    PROTTOTAL 6.5 (L) 2011    ALT <6 2011    AST 25 2011    ALKPHOS 114 2011    BILITOTAL 0.4 2011    TSH 1.61 2011    HCG POSITIVE 2018       Preop Vitals  BP Readings from Last 3 Encounters:   19 120/57   19 115/72   19 106/64    Pulse Readings from Last 3 Encounters:   19 91   19 90   19 95      Resp Readings from Last 3 Encounters:   19 20   19 18   19 16    SpO2 Readings from Last 3 Encounters:   19 96%   19 96%   19 97%      Temp Readings from Last 1 Encounters:   19 36.9  C (98.5  F)  "(Oral)    Ht Readings from Last 1 Encounters:   06/25/19 1.753 m (5' 9\")      Wt Readings from Last 1 Encounters:   06/25/19 77.6 kg (171 lb)    Estimated body mass index is 25.25 kg/m  as calculated from the following:    Height as of this encounter: 1.753 m (5' 9\").    Weight as of this encounter: 77.6 kg (171 lb).     LDA:  Peripheral IV 06/25/19 Distal;Right;Dorsal Lower forearm (Active)   Site Assessment WDL 6/25/2019 11:50 PM   Line Status Infusing 6/25/2019 11:50 PM   Phlebitis Scale 0-->no symptoms 6/25/2019 11:50 PM   Infiltration Scale 0 6/25/2019 11:50 PM   Number of days: 1            ROSANGELA F[facility] AN PLAN NO PONV RULE    neg OB JOSAFAT Rosario MD  "

## 2019-06-26 NOTE — PLAN OF CARE
Patient arrived to [facility] unit via wheelchair at 1745 after visiting infant in NICU,with belongings, accompanied by spouse/ significant other and family.  Received report from [facility] Unit and room orientation started. Call light given; no concerns present at this time. Continue with plan of care.

## 2019-06-26 NOTE — H&P
[facility]  Ob Admit /Triage Note    Blaire Owusu MRN# [id number]   Age: 24 year old YOB: 1995     Date of Admission: 2019 11:36 PM  Date of service: 2019.       History of Present Illness (Resident / Clinician):   Blaire Owusu is a patient of Dr. Kendall from [facility].   She is a 24 year old  who is 37w0d pregnant with LIEN  2019, by 7wk US (inconsistent with LMP).    [facility], noting LOF since 0 per RN report.  Per RN, she is grossly ruptured.  Pt notices CTX and increasing discomfort.  No VB, normal FM.     Her prenatal course has been complicated by THC use with +UDS during pregnancy.  No other substance use.    Prenatal labs   Lab Results   Component Value Date    ABO O 2018    RH Pos 2018    AS Neg 2018    HEPBANG Nonreactive 2018    CHPCRT Negative 2018    GCPCRT Negative 2018    HGB 11.7 2019    GBS Positive (A) 2019              Obstetrical History:   She is a 24 year old   Her OB history:   OB History    Para Term  AB Living   1 0 0 0 0 0   SAB TAB Ectopic Multiple Live Births   0 0 0 0 0      # Outcome Date GA Lbr Warren/2nd Weight Sex Delivery Anes PTL Lv   1 Current                     Immunzations:     Most Recent Immunizations   Administered Date(s) Administered     FLU 6-35 months 2011     HPV Quadrivalent 2011     Influenza (IIV3) PF 2011     Influenza Vaccine IM 3yrs+ 4 Valent IIV4 2018     MMR 2008     Meningococcal (Menactra ) 2008     TDAP Vaccine (Boostrix) 2019            Past Medical History:     Past Medical History:   Diagnosis Date     Anxiety      Depression      Depressive disorder      Eating disorder Bulemia     Mutilations, self     Cutting            Past Surgical History:     Past Surgical History:   Procedure Laterality Date     DENTAL  "SURGERY Bilateral 2012    [place] teeth removal, all 4            Family History:     Family History   Problem Relation Age of Onset     Hypertension Father             Social History:   no current ETOH or cigarette use; pt has used THC in the pregnancy  Here with her partner, Manjeet, and her parents         Medications:     No current facility-administered medications on file prior to encounter.   Current Outpatient Medications on File Prior to Encounter:  citalopram (CELEXA) 20 MG tablet Take 20 mg by mouth daily.   famotidine (PEPCID) 20 MG tablet Take 1 tablet (20 mg) by mouth 2 times daily   Prenatal Vit-Fe Fumarate-FA (PRENATAL MULTIVITAMIN W/IRON) 27-0.8 MG tablet Take 1 tablet by mouth daily            Allergies:   Lactose         Review of Systems:   CONSTITUTIONAL: no fatigue, no unexpected change in weight  SKIN: no worrisome rashes or lesions  EYES: no acute vision problems or changes  ENT: no ear problems, no mouth problems, no throat problems  RESP: no significant cough, no shortness of breath  CV: no chest pain, no palpitations, no new or worsening peripheral edema  GI: no nausea, no vomiting, no constipation, no diarrhea  : no frequency, no dysuria, no hematuria; +ROM by history   NEURO: no weakness, no dizziness, no headaches  ENDOCRINE: no temperature intolerance, no skin/hair changes  PSYCHIATRIC: NEGATIVE for changes in mood or trouble with sleep         Physical Exam:   Vitals:   /57   Temp 98.5  F (36.9  C) (Oral)   Resp 20   Ht 1.753 m (5' 9\")   Wt 77.6 kg (171 lb)   LMP 09/24/2018 (Approximate)   BMI 25.25 kg/m    171 lbs 0 oz  Estimated body mass index is 25.25 kg/m  as calculated from the following:    Height as of this encounter: 1.753 m (5' 9\").    Weight as of this encounter: 77.6 kg (171 lb).    GEN: Awake, alert, uncomfortable  HEENT: grossly normal  NECK: no lymphadenopathy  RESPIRATORY: clear to auscultation bilaterally, no increased work of breathing  BACK:  no " costovertebral angle tenderness   CARDIOVASCULAR: RRR, no murmur  Abd: gravid, VTX by Leopold's.  Cervix: 2-3/70/-1 per RN on admit; 5/80/-1 by me at 0100  EXT:  no edema or calf tenderness  EFW on Exam: 3000g  Confirmed VTX by Ultrasound? YES    NST interpretation:  Ordered by  Payal Vanessa MD  Start time 2330   Stop time 2350  External Monitor  FHT: Baseline 140 bpm.  Variability is moderate (5-25 bpm)  Accelerations are Present, decelerations are Absent   TOCO: Contractions every 2-3 minutes   Tracing is Category 1.   Interpretation: reactive        Assessment and Plan:   Assessment:   Blaire Owusu is a 24 year old  at 37w0d with PROM and early labor, but becoming uncomfortable   Patient Active Problem List   Diagnosis     ADHD (attention deficit hyperactivity disorder)     Deliberate self-cutting     Depressive disorder     MILES (generalized anxiety disorder)     Major depression in remission (H)     Menorrhagia     Mixed anxiety depressive disorder     Oppositional defiant disorder     Parent/child conflict     Substance abuse affecting pregnancy, antepartum     Risk of  labor     Supervision of high risk pregnancy, antepartum     Encounter for triage in pregnant patient     PROM (premature rupture of membranes)         Plan:   Admit - see IP orders  Pain medication - pt is considering nitrous, possibly epidural  - will inform anesthesia of potential epidural   Prophylactic antibiotic for +GBS status  - PCN ordered  THC use during pregnancy  - UDS done today in clinic (+THC noted), will need to test baby's cord blood after delivery  - SW consult after delivery  Anticipate   - Dr. Tierney notified of patient's status      # Pain Assessment:  Current Pain Score 2019   Patient currently in pain? yes   Pain score (0-10) -   - Blaire is experiencing pain due to labor. Pain management was discussed with Blaire and her partner and parents and the plan was created in a collaborative fashion.  Blaire's  response to the current recommendations: engaged  - Please see the plan for pain management as documented above    Payal Vanessa MD

## 2019-06-26 NOTE — PLAN OF CARE
Data: Patient presented to [facility] at .   Reason for maternal/fetal assessment per patient is Spontaneous Rupture of Membrane  .  Patient is a . Prenatal record reviewed.      OB History    Para Term  AB Living   1 0 0 0 0 0   SAB TAB Ectopic Multiple Live Births   0 0 0 0 0      # Outcome Date GA Lbr Warren/2nd Weight Sex Delivery Anes PTL Lv   1 Current            . Medical history:   Past Medical History:   Diagnosis Date     Anxiety      Depression      Depressive disorder      Eating disorder Bulemia     Mutilations, self     Cutting   . Gestational Age 37w0d. VSS. Fetal movement present. Patient denies cramping, backache, vaginal discharge, pelvic pressure, UTI symptoms, GI problems, bloody show, vaginal bleeding, edema, headache, visual disturbances, epigastric or URQ pain, abdominal pain, pt. Reports leaking clear fluid, since . Support persons are present.  Action: Verbal consent for EFM. Triage assessment completed. EFM applied for uterin/fetal assessment.  Fetal assessment: Presumed adequate fetal oxygenation documented (see flow record).   Response: Dr. Vanessa  informed of . Plan per provider is admit for labor. Patient verbalized agreement with plan. Patient transferred to room 444 ambulatory, oriented to room and call light.

## 2019-06-26 NOTE — PLAN OF CARE
Pt noted pain increasing on Lside despite using PCEA remote on epidural. Epidural history noted no boluses given. Instructed pt to push PCEA and no bolus was distributed as well as when this RN attempted to exchange remote for PCEA use, it was noted the remote was stuck in the outlet. PCEA dose given per RN by button on epidural pump, machine exchanged, new PCEA remote used successfully, and pt now noting decrease in pain/increasing comfort.   Pt to attempt nap for 1hour. Partner gonzález and pt mom present, lights dimmed, quiet environment promoted.   Pt to call with requests/concerns.

## 2019-06-27 LAB — HGB BLD-MCNC: 10.2 G/DL (ref 11.7–15.7)

## 2019-06-27 PROCEDURE — 85018 HEMOGLOBIN: CPT | Performed by: ADVANCED PRACTICE MIDWIFE

## 2019-06-27 PROCEDURE — 36415 COLL VENOUS BLD VENIPUNCTURE: CPT | Performed by: ADVANCED PRACTICE MIDWIFE

## 2019-06-27 PROCEDURE — 25000132 ZZH RX MED GY IP 250 OP 250 PS 637: Performed by: STUDENT IN AN ORGANIZED HEALTH CARE EDUCATION/TRAINING PROGRAM

## 2019-06-27 PROCEDURE — 12000001 ZZH R&B MED SURG/OB UMMC

## 2019-06-27 RX ORDER — AMOXICILLIN 250 MG
1 CAPSULE ORAL 2 TIMES DAILY
Qty: 28 TABLET | Refills: 0 | Status: SHIPPED | OUTPATIENT
Start: 2019-06-27 | End: 2019-08-13

## 2019-06-27 RX ORDER — IBUPROFEN 600 MG/1
600 TABLET, FILM COATED ORAL EVERY 6 HOURS PRN
Qty: 84 TABLET | Refills: 0 | Status: SHIPPED | OUTPATIENT
Start: 2019-06-27 | End: 2022-05-10

## 2019-06-27 RX ADMIN — SENNOSIDES AND DOCUSATE SODIUM 1 TABLET: 8.6; 5 TABLET ORAL at 19:25

## 2019-06-27 RX ADMIN — IBUPROFEN 600 MG: 600 TABLET ORAL at 08:59

## 2019-06-27 RX ADMIN — IBUPROFEN 600 MG: 600 TABLET ORAL at 22:39

## 2019-06-27 RX ADMIN — CITALOPRAM HYDROBROMIDE 20 MG: 20 TABLET ORAL at 08:59

## 2019-06-27 RX ADMIN — IBUPROFEN 600 MG: 600 TABLET ORAL at 14:56

## 2019-06-27 NOTE — PROGRESS NOTES
"                                                          [facility] Postpartum Progress Note  2019 7:50 AM  Date of service: 2019          Interval History:   Blaire Owusu is doing well, PPD1 from a  at 37+1, doing well, visiting her baby in the NICU and pumping.    Pain controlled? Yes, with ibuprofen   Ambulating? Yes  Regular diet? yes  Voiding? yes  Lochia? \"Normal period\"  Breastfeeding? Yes, pumping and bottle feeding due to  tubes in the NICU has not fed at breast  Contraception? Plans for mirena IUD         Physical Exam:     Vitals:    19 1615 19 1800 19 2345 19 0400   BP: 132/62 120/77 114/70 121/70   Resp: 16 16 16 16   Temp:  98.1  F (36.7  C) 98.1  F (36.7  C) 97.8  F (36.6  C)   TempSrc:  Oral Oral Oral   SpO2: 99% 100%     Weight:       Height:           GENERAL:         healthy, alert and no distress  RESPIRATORY:   No increased work of breathing, good air exchange, clear to auscultation bilaterally, no crackles or wheezing   CARDIOVASCULAR:   Normal apical impulse, regular rate and rhythm, normal S1 and S2, no S3 or S4, and no murmur noted   ABDOMEN:   No scars, normal bowel sounds, soft, non-distended, non-tender, no masses palpated, no hepatosplenomegally  Fundal height at level of umbilicus + 1 cm.  Firm and non tender.   EXTREMITIES:          no edema, non-tender          Data:     Hemoglobin   Date Value Ref Range Status   2019 10.9 (L) 11.7 - 15.7 g/dL Final   2019 11.7 11.7 - 15.7 g/dL Final     Lab Results   Component Value Date    RH Pos 2019            Assessment and Plan:    Blaire Owusu is a 24 year old  PPD#1 s/p . Her prenatal course has been complicated by THC use with +UDS during pregnancy.  No other substance use.  L&D complications: , predelivery Hg 10.9  GBS +, adequately treated with PCN     Patient Active Problem List   Diagnosis     ADHD (attention deficit hyperactivity disorder) "     Deliberate self-cutting     Depressive disorder     MILES (generalized anxiety disorder)     Major depression in remission (H)     Menorrhagia     Mixed anxiety depressive disorder     Oppositional defiant disorder     Parent/child conflict     Substance abuse affecting pregnancy, antepartum     Risk of  labor     Supervision of high risk pregnancy, antepartum     Encounter for triage in pregnant patient     PROM (premature rupture of membranes)      (normal spontaneous vaginal delivery)       Pain: ibuprofen  Anemia?Yes: iron supplements during pregnancy, pre delivery hemoglobin 10.9, recheck pending  Diet: Normal   Encourage ambulation  Baby feeding by bottle  Contraception: Mirena IUD   Influenza vaccine: 2018   Rh positive: Rhogam not indicated  Rubella immune: MMR not indicated  Tdap (for pertussis): 2019  SW consulted for: baby in NICU, THC + UDS during pregnancy  Counseled about: breastfeeding and coping with baby in the NICU  Dispo: Routine post-partum cares, anticipate DC home PPD# 2.  Senna and ibuprofen ordered for discharge, patient to continue prenatal and iron supplementation, has this at home.    # Pain Assessment:  Current Pain Score 2019   Patient currently in pain? denies   Pain score (0-10) -   Pain descriptors -   - Blaire is experiencing pain due to . Pain management was discussed and the plan was created in a collaborative fashion.  Blaire's response to the current recommendations: engaged  - Please see the plan for pain management as documented above          Sherri Dimas MD  [facility] Family Medicine Resident, [facility]  [phone]

## 2019-06-27 NOTE — CONSULTS
[facility]  MATERNAL CHILD HEALTH SOCIAL WORK PROGRESS NOTE    SW attempted visit with couple at pt's bedside on [facility] this morning, couple were asleep. SW attempted to follow-up this afternoon, pt was in the NICU. During third attempted visit, pt was visiting with extended family. SW will meet with family tomorrow.    Due to positive maternal u-tox at delivery for THC, SW completed verbal and written mandated report to [facility]. SW will update pt re: report upon meeting.    SW will continue to assess needs and provide ongoing psychosocial support and access to appropriate resources/referrals. SW will continue to collaborate with the multidisciplinary team.    GEORGETTE Da Silva, [facility] Coast HospitalSW  Clinical   Maternal Child Health  [facility]  Phone:   [phone]  Pager:    [phone]

## 2019-06-27 NOTE — PLAN OF CARE
Data: VSS, postpartum assessments WNL. She is voiding without difficulty, up ad rahul, passing gas, eating and drinking normally. Perineum appears to be healing well, lochia WNL, no s/s infection. She is independent with self cares. Pumping for baby in the NICU with no assistance. Has declined interventions/for pain. Reports very little discomfort.    Action: Education provided on discharge goals.   Response: Pt is agreeable with her plan of care. Positive attachment behaviors observed with infant. Support person present. Anticipate discharge 6/28.

## 2019-06-27 NOTE — PLAN OF CARE
Oriented to room and unit routines and welcome folder.  VSS and postpartum assessments WDL.  Up ad rahul with steady gait and walked down to NICU to visit infant.  Independent with cares.  Pumping with assist initially and now independently.  Pain managed with ibuprofen per MAR.  Voiding without difficulty.  Significant other, Manjeet present and supportive.  Will continue with postpartum cares and education per plan of care.

## 2019-06-27 NOTE — LACTATION NOTE
"D: Blaire states she has no history of breast/chest surgery or trauma.  She takes Celexa (Hale L2). Her medical record indicates a history ADHD, deliberate self-cutting, anxiety, depression, oppositional defiant disorder,   She has a positive maternal u-tox at [facility] for THC. Cord tox screen in process. Social Work assessment is pending. Zafar is her first child. She has already started to pump.   I:  I gave her a folder of introductory materials, reviewed physiology of colostrum and milk production, pumping guidelines, and I gave her a log and encouraged her to use it.  I explained how to access the videos \"Hand Expression\" and \"Maximizing Milk Production\"; as well as other helpful books and websites.  We discussed hands-on pumping techniques and usefulness of a hands-free pumping bra.  We discussed skin to skin holding and how to reach breastfeeding goals.  We talked about medications during breastfeeding. Discussed risks of using marijuana while breastfeeding and discouraged use.  She verbalized understanding. She has pump coverage through her insurance company.    A:  Mom has information she needs to initiate her supply.   P:  Will continue to provide lactation support.  Carmencita Roque, RNC, IBCLC        "

## 2019-06-27 NOTE — TELEPHONE ENCOUNTER
Called insurance to check status of P/A request. It is currently in review. Can take up to 10 calendar days. I will follow up next week.

## 2019-06-27 NOTE — PLAN OF CARE
Patient vital signs and assessment findings. Denies pain and declines the ibuprofen or tylenol that is available. Bleeding scant, fundus firm. Using tucks, encouraged to use icepacks. Pumping independently x1, encouraged to more frequently. Reinforced education of 8-12x in 24 hr period. Scarring at wrists and upper thighs. Supported by boyfriend at bedside. Up ad rahul, voiding and stooling. Appears hopeful and optimistic about baby.

## 2019-06-28 ENCOUNTER — TELEPHONE (OUTPATIENT)
Dept: FAMILY MEDICINE | Facility: CLINIC | Age: 24
End: 2019-06-28

## 2019-06-28 VITALS
RESPIRATION RATE: 16 BRPM | WEIGHT: 171 LBS | BODY MASS INDEX: 25.33 KG/M2 | SYSTOLIC BLOOD PRESSURE: 124 MMHG | HEIGHT: 69 IN | DIASTOLIC BLOOD PRESSURE: 78 MMHG | OXYGEN SATURATION: 100 % | TEMPERATURE: 97.8 F

## 2019-06-28 LAB
CANNABINOIDS UR CFM-MCNC: 886 NG/ML
CARBOXYTHC/CREAT UR: 1018 NG/MG{CREAT}

## 2019-06-28 PROCEDURE — 25000132 ZZH RX MED GY IP 250 OP 250 PS 637: Performed by: STUDENT IN AN ORGANIZED HEALTH CARE EDUCATION/TRAINING PROGRAM

## 2019-06-28 RX ADMIN — CITALOPRAM HYDROBROMIDE 20 MG: 20 TABLET ORAL at 08:57

## 2019-06-28 RX ADMIN — IBUPROFEN 600 MG: 600 TABLET ORAL at 08:55

## 2019-06-28 NOTE — TELEPHONE ENCOUNTER
Please call patient to schedule 2 week and 6 week postpartum visits:    Date of Delivery: 6/26/19  Anticipated Date of Discharge: 6/28/19    Please document all calls. Close encounter after appointment is scheduled or after last attempt has been made    Michaela Bullard RN

## 2019-06-28 NOTE — PLAN OF CARE
Data: Vital signs stable, postpartum assessments within normal limits.   Eating and drinking without N/V.  Up ad rahul, and voiding without difficulty.  Pumping for baby in NICU.   Perineum appears to be healing well, no s/s infection.  Discharge outcomes on care plan met.   Pain managed with prn ibuprofen . Pt states she is comfortable.  Mood is stable and Pt is followed by a therapist.   Action: Review of care plan, teaching, and discharge instructions done.  Response: Patient states understanding and comfort with her discharge instructions and plan of care. All questions addressed. She will discharge home.

## 2019-06-28 NOTE — PLAN OF CARE
Patient vital signs and assessment findings normal. Pain managed with ibuprofen. Mild swelling at perineum, using ice. Bleeding scant, fundus firm. Scarring at wrists and upper thighs. Pumping independently. Supported by boyfriengeorgia Burroughs at bedside. EDS of 9, last question 0. Sw consult in and pending for utox and NICU admission. Pt pleasant and optimistic about baby. Continue POC.

## 2019-06-28 NOTE — PROGRESS NOTES
D: Social Work    I: Met with parents bedside. Discussed with parents that CPS report was made due to positive THC screen. Mom was aware and was understanding that CPS would be contacting her.   She states she has a good support system and will be staying with her parents for the first few days after discharge. She and her partner have an apartment and she will return home after a few days. Mom is student and works as a . She is not taking summer courses and will be off work for a few months. Father is working.   Parents have all the necessary needs for baby including car seat, crib etc....   Reviewed post partum depression with mom and she states she is well versed and would not hesitate to reach out for help if needed. Provided her with PPSM resource and encouraged her to call if needed.    A/P: Parents understand a CPS report was made due to positive THC screen. Mom has past history of other drug use but states she stopped those right after learning she was PG. She states this is not an on going issue and that she does not feel she needs any further supports on other drug/CD issues.

## 2019-07-01 NOTE — TELEPHONE ENCOUNTER
Called insurance to check status of P/A request. It is currently in review. There should be a determination by 07/04/19.

## 2019-07-03 NOTE — TELEPHONE ENCOUNTER
PRIOR AUTHORIZATION DENIED    Medication: famotidine (PEPCID) 20 MG tablet- P/A DENIED    Denial Date: 7/2/2019    Denial Rational: Appears Famotidine (40mg) looks to be covered, as well as Ranitidine 300mg tabs or syrup        Appeal Information:

## 2019-07-03 NOTE — TELEPHONE ENCOUNTER
Do you have any questions/concerns regarding yourself for the nurse today? No.    2 week postpartum scheduled on 7/10/19 with Dr Maurer    6 week postpartum scheduled on 8/13/19 with Dr Moses    IUD insertion scheduled with Dr Bell on 8/13/19.

## 2019-07-05 PROBLEM — O26.899 HEARTBURN DURING PREGNANCY, ANTEPARTUM: Status: ACTIVE | Noted: 2019-07-05

## 2019-07-05 PROBLEM — R12 HEARTBURN DURING PREGNANCY, ANTEPARTUM: Status: ACTIVE | Noted: 2019-07-05

## 2019-07-05 NOTE — TELEPHONE ENCOUNTER
07/05/19    2:32 PM    Patient was prescribed famotidine 20 mg BID prior to delivery, P/A failed, but ranitidine 300 mg is an option.     Will prescribe 30 days of 300 mg HS, advise patient she can buy OTC and take 150 mg BID as alternative.     Suggest discussing long-term plan during upcoming appointment. Will forward to refill pool.      Sayra Maria MD  Family Medicine Resident [facility]  Pager: [phone]

## 2019-07-10 ENCOUNTER — OFFICE VISIT (OUTPATIENT)
Dept: FAMILY MEDICINE | Facility: CLINIC | Age: 24
End: 2019-07-10
Payer: COMMERCIAL

## 2019-07-10 VITALS
BODY MASS INDEX: 21.18 KG/M2 | TEMPERATURE: 97.7 F | WEIGHT: 143 LBS | RESPIRATION RATE: 16 BRPM | OXYGEN SATURATION: 96 % | DIASTOLIC BLOOD PRESSURE: 73 MMHG | HEART RATE: 96 BPM | HEIGHT: 69 IN | SYSTOLIC BLOOD PRESSURE: 111 MMHG

## 2019-07-10 DIAGNOSIS — D64.9 ANEMIA, UNSPECIFIED TYPE: Primary | ICD-10-CM

## 2019-07-10 LAB
HCT VFR BLD AUTO: 46 % (ref 35–47)
HEMOGLOBIN: 14 G/DL (ref 11.7–15.7)
MCH RBC QN AUTO: 28.6 PG (ref 26.5–35)
MCHC RBC AUTO-ENTMCNC: 30.4 G/DL (ref 32–36)
MCV RBC AUTO: 94 FL (ref 78–100)
PLATELET # BLD AUTO: 585 K/UL (ref 150–450)
RBC # BLD AUTO: 4.89 M/UL (ref 3.8–5.2)
WBC # BLD AUTO: 11.9 K/UL (ref 4–11)

## 2019-07-10 ASSESSMENT — MIFFLIN-ST. JEOR: SCORE: 1463.27

## 2019-07-10 NOTE — LETTER
July 13, 2019      Blaire Owusu  [address]  [place] [address]        Dear Blaire,    Thank you for getting your care at [facility]. Please see below for your test results.    Resulted Orders   CBC with Plt ([facility])   Result Value Ref Range    WBC 11.9 (H) 4.0 - 11.0 K/uL    RBC 4.89 3.80 - 5.20 M/uL    Hemoglobin 14.0 11.7 - 15.7 g/dL    Hematocrit 46.0 35.0 - 47.0 %    MCV 94.0 78.0 - 100.0 fL    MCH 28.6 26.5 - 35.0 pg    MCHC 30.4 (L) 32.0 - 36.0 g/dL    Platelets 585.0 (H) 150.0 - 450.0 K/uL           Sincerely,    Eduarda Maurer MD

## 2019-07-10 NOTE — PROGRESS NOTES
HPI       Blaire Owusu is a 24 year old  who presents for   Chief Complaint   Patient presents with     Post Partum Exam     Here for 2 week post partum check up, no concerns per pt.     Post partum follow up  -Negative maternal screen  -Sleeping well  -Breast feeding well  -Healthy relationship with partner well.      +++++++    Problem, Medication and Allergy Lists were   reviewed and updated if needed.     Patient Active Problem List    Diagnosis Date Noted     Heartburn during pregnancy, antepartum 2019     Priority: Medium      (normal spontaneous vaginal delivery) 2019     Priority: Medium     Encounter for triage in pregnant patient 2019     Priority: Medium     PROM (premature rupture of membranes) 2019     Priority: Medium     Supervision of high risk pregnancy, antepartum 2018     Priority: Medium     HIGH RISK  with LIEN  2019  MATERNAL HISTORY/DIAGNOSES   1. Depression, stable on Celexa  2. Cannibus use in pregnancy, most recent UDS THC+ 19  Blood Type: O +  OB PROVIDERS   1. Margaret Tierney, pager [phone]  2. Eduarda Maurer, pager [phone]  3. Michelle Espinosa, pager [phone]  PRENATAL CARE PLAN     Consults: MFM    Ultrasounds: 7w1d dating US not c/w LMP, fetal survey : thickened nuchal fold and echogenic intracardiac focus, MFM comp US 3/5: echogenic intracardiac focus, otherwise normal    Normal cell-free DNA test  DELIVERY PLAN     Planned mode/timing of delivery:     Notifications during labor: Page resident OB providers above    Post Partum Contraception: IUD  GBS:         Risk of  labor 2018     Priority: Medium     Q13,15,17: History of Substance Abuse  Q14,16; Substance use this pregnancy  Q19: History of Depression, bi-polar, anxiety, schizophrenia   Q20: Depression, bi-polar, anxiety, schizophrenia this pregnancy       MILES (generalized anxiety disorder) 2018     Priority: Medium     Major depression in  remission (H) 03/14/2018     Priority: Medium     Mixed anxiety depressive disorder 06/05/2014     Priority: Medium     Deliberate self-cutting 04/23/2012     Priority: Medium     Overview:   Long standing issue with multiple inpatient treatments.        Substance abuse affecting pregnancy, antepartum 04/23/2012     Priority: Medium     Cannabis use       Menorrhagia 09/02/2011     Priority: Medium     Overview: Previously on OCPs.       Oppositional defiant disorder 03/03/2011     Priority: Medium     Parent/child conflict 03/02/2011     Priority: Medium     ADHD (attention deficit hyperactivity disorder) 11/05/2010     Priority: Medium     Depressive disorder 11/05/2010     Priority: Medium   ,     Current Outpatient Medications   Medication Sig Dispense Refill     citalopram (CELEXA) 20 MG tablet Take 20 mg by mouth daily.       ibuprofen (ADVIL/MOTRIN) 600 MG tablet Take 1 tablet (600 mg) by mouth every 6 hours as needed for other (cramping) 84 tablet 0     Prenatal Vit-Fe Fumarate-FA (PRENATAL MULTIVITAMIN W/IRON) 27-0.8 MG tablet Take 1 tablet by mouth daily       senna-docusate (SENOKOT-S/PERICOLACE) 8.6-50 MG tablet Take 1 tablet by mouth 2 times daily 28 tablet 0     famotidine (PEPCID) 20 MG tablet Take 1 tablet (20 mg) by mouth 2 times daily (Patient not taking: Reported on 7/10/2019) 60 tablet 1     ranitidine (ZANTAC) 300 MG tablet Take 1 tablet (300 mg) by mouth At Bedtime (Patient not taking: Reported on 7/10/2019) 30 tablet 0   ,     Allergies   Allergen Reactions     Lactose GI Disturbance   .    Patient is   an established patient of this clinic.  Past Medical History:   Diagnosis Date     Anxiety      Depression      Depressive disorder      Eating disorder Bulemia     Mutilations, self     Cutting     Family History   Problem Relation Age of Onset     Hypertension Father      Social History     Socioeconomic History     Marital status: Single     Spouse name: None     Number of children: None      Years of education: None     Highest education level: None   Occupational History     None   Social Needs     Financial resource strain: None     Food insecurity:     Worry: None     Inability: None     Transportation needs:     Medical: None     Non-medical: None   Tobacco Use     Smoking status: Former Smoker     Packs/day: 0.10     Years: 1.00     Pack years: 0.10     Last attempt to quit: 2018     Years since quittin.6     Smokeless tobacco: Never Used   Substance and Sexual Activity     Alcohol use: Yes     Comment: bottle of Vodka/month     Drug use: Yes     Types: Marijuana     Comment: extasy     Sexual activity: Yes     Partners: Female, Male     Birth control/protection: Condom   Lifestyle     Physical activity:     Days per week: None     Minutes per session: None     Stress: None   Relationships     Social connections:     Talks on phone: None     Gets together: None     Attends Gnosticist service: None     Active member of club or organization: None     Attends meetings of clubs or organizations: None     Relationship status: None     Intimate partner violence:     Fear of current or ex partner: None     Emotionally abused: None     Physically abused: None     Forced sexual activity: None   Other Topics Concern     None   Social History Narrative     None   .         Review of Systems:   Review of Systems  Skin: negative except as above  Respiratory: negative except as above  Cardiovascular: negative except as above  Gastrointestinal: negative except as above  Genitourinary: negative except as above  Musculoskeletal: negative except as above  Neurologic: negative except as above  Psychiatric: negative except as above  Hematologic/Lymphatic/Immunologic: negative except as above  Endocrine: negative except as above         Physical Exam:     Vitals:    07/10/19 1307   BP: 111/73   BP Location: Left arm   Patient Position: Sitting   Cuff Size: Adult Regular   Pulse: 96   Resp: 16   Temp: 97.7  F  "(36.5  C)   TempSrc: Oral   SpO2: 96%   Weight: 64.9 kg (143 lb)   Height: 1.745 m (5' 8.7\")     Body mass index is 21.3 kg/m .  Vitals were reviewed and were normal     Physical Exam  Constitutional: Oriented to person, place, and time. Appears well-developed and well-nourished.   HENT:   Head: Normocephalic and atraumatic.   Eyes: Conjunctivae are normal.   Neck: Normal range of motion.   Cardiovascular: Normal rate, regular rhythm, normal heart sounds and intact distal pulses.    Pulmonary/Chest: Effort normal and breath sounds normal. No respiratory distress. No wheezes.   Musculoskeletal: Normal range of motion.   Neurological: Alert and oriented to person, place, and time.   Skin: Skin is warm and dry.   Psychiatric: Has a normal mood and affect. Behavior is normal.       Results:   No testing ordered today    Assessment and Plan        1. Anemia, unspecified type  Patient has a history of anemia as per the last check when she was admitted to the hospital and there is some concern for it being symptomatic with intermittent dizziness, fatigue, and other mild symptoms.  She is otherwise breast-feeding well and denies any signs of depression or anxiety and states her partner is very supportive.  Denies any current complaints otherwise however at this time would repeat a CBC to further evaluate.  At this time there is no indication to work-up beyond a CBC.  - CBC with Plt ([facility])    2.  Routine post partum care  -Patient is planning to get a Mirena IUD for contraception in the future  -Negative postpartum's screening.  -Patient to follow-up at routine 6-week.    Please call or return to clinic if your symptoms don't go away.    Follow up plan  Please make a clinic appointment for follow up with me in 1 week.     Thank you for coming to [facility] today.       There are no discontinued medications.    Options for treatment and follow-up care were reviewed with the patient. Blaire Owusu  engaged in the " decision making process and verbalized understanding of the options discussed and agreed with the final plan.    Eduarda Maurer MD

## 2019-07-10 NOTE — TELEPHONE ENCOUNTER
Called patient and informed her of the plan. Patient stated understanding.  Nurys Cardenas, [facility]

## 2019-07-10 NOTE — PATIENT INSTRUCTIONS
Here is the plan from today's visit    1. Anemia, unspecified type  - CBC with Plt ([place])    Please call or return to clinic if your symptoms don't go away.    Follow up plan  Please make a clinic appointment for follow up with me in 1 week.     Thank you for coming to [facility] today.  Lab Testing:  **If you had lab testing today and your results are reassuring or normal they will be mailed to you or sent through [employer] within 7 days.   **If the lab tests need quick action we will call you with the results.  The phone number we will call with results is # [phone] (home) . If this is not the best number please call our clinic and change the number.  Medication Refills:  If you need any refills please call your pharmacy and they will contact us.   If you need to  your refill at a new pharmacy, please contact the new pharmacy directly. The new pharmacy will help you get your medications transferred faster.   Scheduling:  If you have any concerns about today's visit or wish to schedule another appointment please call our office during normal business hours [phone] (8-5:00 M-F)  If a referral was made to a [facility] and you don't get a call from central scheduling please call [phone].  If a Mammogram was ordered for you at [facility] call [phone] to schedule or change your appointment.  If you had an XRay/CT/Ultrasound/MRI ordered the number is [phone] to schedule or change your radiology appointment.   Medical Concerns:  If you have urgent medical concerns please call [phone] at any time of the day.    Eduarda Maurer MD

## 2019-08-13 ENCOUNTER — OFFICE VISIT (OUTPATIENT)
Dept: FAMILY MEDICINE | Facility: CLINIC | Age: 24
End: 2019-08-13
Payer: COMMERCIAL

## 2019-08-13 VITALS
HEART RATE: 80 BPM | DIASTOLIC BLOOD PRESSURE: 64 MMHG | BODY MASS INDEX: 21.89 KG/M2 | TEMPERATURE: 97.5 F | RESPIRATION RATE: 16 BRPM | WEIGHT: 147.8 LBS | HEIGHT: 69 IN | OXYGEN SATURATION: 98 % | SYSTOLIC BLOOD PRESSURE: 102 MMHG

## 2019-08-13 VITALS
TEMPERATURE: 97.7 F | RESPIRATION RATE: 16 BRPM | OXYGEN SATURATION: 98 % | HEART RATE: 79 BPM | WEIGHT: 148.2 LBS | DIASTOLIC BLOOD PRESSURE: 68 MMHG | BODY MASS INDEX: 21.95 KG/M2 | HEIGHT: 69 IN | SYSTOLIC BLOOD PRESSURE: 103 MMHG

## 2019-08-13 DIAGNOSIS — D50.9 IRON DEFICIENCY ANEMIA, UNSPECIFIED IRON DEFICIENCY ANEMIA TYPE: Primary | ICD-10-CM

## 2019-08-13 DIAGNOSIS — Z30.430 ENCOUNTER FOR IUD INSERTION: Primary | ICD-10-CM

## 2019-08-13 DIAGNOSIS — Z30.430 ENCOUNTER FOR INSERTION OF INTRAUTERINE CONTRACEPTIVE DEVICE: ICD-10-CM

## 2019-08-13 LAB — HCG UR QL: NEGATIVE

## 2019-08-13 RX ORDER — FERROUS SULFATE 325(65) MG
325 TABLET, DELAYED RELEASE (ENTERIC COATED) ORAL DAILY
Qty: 90 TABLET | Refills: 3 | Status: SHIPPED | OUTPATIENT
Start: 2019-08-13 | End: 2022-04-19

## 2019-08-13 RX ORDER — COPPER 313.4 MG/1
1 INTRAUTERINE DEVICE INTRAUTERINE ONCE
Status: COMPLETED
Start: 2019-08-13 | End: 2019-08-13

## 2019-08-13 RX ADMIN — COPPER 1 EACH: 313.4 INTRAUTERINE DEVICE INTRAUTERINE at 14:28

## 2019-08-13 ASSESSMENT — PATIENT HEALTH QUESTIONNAIRE - PHQ9: SUM OF ALL RESPONSES TO PHQ QUESTIONS 1-9: 0

## 2019-08-13 ASSESSMENT — MIFFLIN-ST. JEOR
SCORE: 1489.8
SCORE: 1496.22

## 2019-08-13 NOTE — PROGRESS NOTES
"       HPI       Blaire Owusu is a 24 year old  who presents for   Chief Complaint   Patient presents with     Post Partum Exam     No concerns per pt., states everything has been going well.     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   *** Father retired, will watch.     {:397123}   +++++++  {Additional Concerns  (FM):305467}    Problem, Medication and Allergy Lists were {Reviewed Lists:438066}.    Patient is {New/Established:016027::\"an established patient of this clinic.\"}.         Review of Systems:   Review of Systems         Physical Exam:     Vitals:    08/13/19 1457   BP: 103/68   BP Location: Left arm   Patient Position: Sitting   Cuff Size: Adult Regular   Pulse: 79   Resp: 16   Temp: 97.7  F (36.5  C)   TempSrc: Oral   SpO2: 98%   Weight: 67.2 kg (148 lb 3.2 oz)   Height: 1.76 m (5' 9.29\")     Body mass index is 21.7 kg/m .  {[facility] VITALS OPTIONS:123360::\"Vitals were reviewed and were normal\"}     Physical Exam  {  Detailed Ortho and mental status exam:644722}    Results:   {UMP FM result choices :988819}    Assessment and Plan    {Assessment/Plan Pick List :723580}       There are no discontinued medications.    Options for treatment and follow-up care were reviewed with the patient. Blaire Owusu  engaged in the decision making process and verbalized understanding of the options discussed and agreed with the final plan.    Elsi Moses, DO  "

## 2019-08-13 NOTE — PROGRESS NOTES
HPI-Post Partum Visit -       Blaire Owusu is a 24 year old  female  now 6 weeks s/p NVSD after PROM at 37w1d with a significant past medical history of iron deficiency anemia, depression, deliberate self cutting, and anxiety who presents for a postpartum visit. Patient reports that she is doing well since delivery and feels very supported by boyfriend/father of child, with whom she lives. 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 Also has help from father who is retired, as well as boyfriend.     Obstetric History:  OB History    Para Term  AB Living   1 1 1 0 0 1   SAB TAB Ectopic Multiple Live Births   0 0 0 0 1      # Outcome Date GA Lbr [place]/2nd Weight Sex Delivery Anes PTL Lv   1 Term 19 37w1d 10:52 / 01:53 3.856 kg (8 lb 8 oz) M Vag-Spont Nitrous, EPI N RL      Name: MARQUEZ,MALE-BLAIRE      Apgar1: 7  Apgar5: 4     Patient Active Problem List   Diagnosis     ADHD (attention deficit hyperactivity disorder)     Deliberate self-cutting     Depressive disorder     MILES (generalized anxiety disorder)     Major depression in remission (H)     Menorrhagia     Mixed anxiety depressive disorder     Oppositional defiant disorder     Parent/child conflict     Substance abuse affecting pregnancy, antepartum     Risk of  labor     Supervision of high risk pregnancy, antepartum     Encounter for triage in pregnant patient     PROM (premature rupture of membranes)      (normal spontaneous vaginal delivery)     Heartburn during pregnancy, antepartum     Current Outpatient Medications   Medication Sig Dispense Refill     citalopram (CELEXA) 20 MG tablet Take 20 mg by mouth daily.       ibuprofen (ADVIL/MOTRIN) 600 MG tablet Take 1 tablet (600 mg) by mouth every 6 hours as needed for other (cramping) 84 tablet 0     Prenatal Vit-Fe Fumarate-FA (PRENATAL MULTIVITAMIN W/IRON) 27-0.8 MG tablet Take 1 tablet by mouth  daily                        Delivery date: 19      Patient had a  of viable boy, weight 8 lbs 8 ozs,           with PROM at 37w1d and no other complications.          Accompanying Signs & Symptoms:                        Breast feeding: breastfeeding going well, every 1-3 hrs, 8-12 times/24 hours. Having some nipple irritation that she is managing adequately with lotion   Abdominal pain: no                       Bleeding since delivery: return of bleeding last week that she believes is her period. Bleeding was present for about 5 days and then stopped. Suspects this was first menstrual period after delivering.        Contraception choice: IUD just placed prior to this office visit during a separate procedure visit        Last PAP:   Lab Results   Component Value Date    PAP NIL 2018     Questions for Caregiver to screen for Post Partum Depression:    During the past month, have you often been bothered by feeling down, depressed, or hopeless? No  During the past month, have you often been bothered by having little interest or pleasure in doing things? No    Pospartum Depression screen:    Performed and negative, but given patient's history of depression and deliberate cutting, mother completed PHQ-9. Result:  PHQ-9 SCORE 2019   PHQ-9 Total Score 0 0     Allergies   Allergen Reactions     Lactose GI Disturbance          Review of Systems:   CONSTITUTIONAL: no fatigue, no unexpected change in weight  SKIN: no worrisome rashes or lesions  EYES: no acute vision problems or changes  ENT: no ear problems, no mouth problems, no throat problems  RESP: no significant cough, no shortness of breath  CV: no chest pain, no palpitations, no new or worsening peripheral edema  GI: no nausea, no vomiting, no constipation, no diarrhea  : no frequency, no dysuria, no hematuria   female: no vaginal discharge or pelvic pain  NEURO: no weakness, no dizziness, no headaches  PSYCHIATRIC: NEGATIVE for  "changes in mood or trouble with sleep   Reports some emotional lability and \"crying for no reason\" for first couple of weeks post-partum but denies since then. Denies SI or HI.            Physical Exam:     Vitals:    19 1457   BP: 103/68   BP Location: Left arm   Patient Position: Sitting   Cuff Size: Adult Regular   Pulse: 79   Resp: 16   Temp: 97.7  F (36.5  C)   TempSrc: Oral   SpO2: 98%   Weight: 67.2 kg (148 lb 3.2 oz)   Height: 1.76 m (5' 9.29\")     GENERAL: healthy, alert, well nourished, well hydrated, no distress  NECK: no adenopathy, no asymmetry, no masses, no stiffness  RESP: lungs clear to auscultation - no rales, no rhonchi, no wheezes  CV: regular rates and rhythm, normal S1 S2, no S3 or S4 and no murmur, no click or rub. Radial and DP pulses are 2+.   ABDOMEN: soft, no tenderness, no  hepatosplenomegaly, no masses, normal bowel sounds  PSYCH: Alert and oriented times 3; speech- coherent , normal rate and volume; able to articulate logical thoughts, able to abstract reason, no tangential thoughts, no hallucinations or delusions, affect- normal.    Office Visit on 2019   Component Date Value Ref Range Status     HCG Qual Urine 2019 NEGATIVE  Negative Final     Assessment and Plan   Patient is a 24 year old  now 6 weeks s/p NVSD with PROM at 37w1d presenting for 6 week post-partum follow up. No concerns from patient today.     1. Post-partum care  - Reinforced patient's decision to breastfeed and encouraged continuation of breastfeeding as sole source of nutrition for child. Encouraged her to return if nipple pain and irritation with breastfeeding worsens.  - Did discuss with patient her prior psychiatric history and the risks it poses for postpartum depression. Reminded patient that she is safe to discuss any concerns with her mental health with us at any time, but no concerns from patient today.  - Pap reviewed from pre-partum OB visits; not due again until .   - " Contraception discussed; patient had Paraguard IUD placed just prior to encounter today. Reiterated that she may contact clinic with any concerns of cramping or pain following IUD placement.   - Encouraged continuing prenatal vitamin    2. Iron deficiency anemia  - Reviewed patient's past hemoglobins: 14.0 on 7/10; 10.2 on 6/27. Patient did switch to chewable prenatal vitamin several weeks ago and does not believe it contains iron, but is not certain. Encouraged patient to continue on current prenatal vitamin and placed prescription for ferrous sulfate 325 mg every day. Reviewed with patient potential for constipation with iron supplementation and she has stool softeners at home; will take as needed.     Options for treatment and follow-up care were reviewed with the patient and/or guardian. Blaire Owusu and/or guardian engaged in the decision making process and verbalized understanding of the options discussed and agreed with the final plan.    This note was created in part by Garrick Vilchis, MS-3.     Resident/Fellow Attestation   I, Elsi Moses, was present with the medical student who participated in the service and in the documentation of the note.  I have verified the history and personally performed the physical exam and medical decision making.  I agree with the assessment and plan of care as documented in the note.      Elsi Moses, DO  PGY3

## 2019-08-13 NOTE — PROGRESS NOTES
Preceptor Attestation:   Patient seen, evaluated and discussed with the resident. I was present for and supervised the entire procedure. I have verified the content of the note, which accurately reflects my assessment of the patient and the plan of care.   Supervising Physician:  Sheryl Bell MD

## 2019-08-13 NOTE — RESULT ENCOUNTER NOTE
Discussed the following results during the clinic visit. See progress note for details.     Sheryl Bell MD

## 2019-08-13 NOTE — PROGRESS NOTES
[facility]  IUD Insertion Note    Blaire Owusu is a patient of Margaret Kemp here for an IUD insertion   Indication: Contraception    Counseling: Discussed potential side effects of Paraguard, including increased bleeding and cramping, as well as the Mirena, including unpredictable spotting and amenorrhea.  Patient aware to check for strings every month.    Consent: Affirmation of informed consent was signed and scanned into the medical record. Risks, benefits and alternatives were discussed including risk of infection, bleeding and small risk of uterine perforation.  Patient's questions were elicited and answered.   Procedure safety checklist was completed:  Yes  Time Out (Pause for the Cause) completed: No    Labs: UPT negative    Technique:   Patient was premedicated with ibuprofen:   Yes  Uterine position was confirmed by bimanual exam: Yes   Using a sterile speculum and equipment, the cervix was examined.     The cervix was cleansed with Betadine prep. A tenaculum was applied to the cervix with tension to straighten the cervical canal.  The uterus was sounded to 7 cm.  A ParaGard IUD was inserted in the usual fashion and strings trimmed 2.5 cm below the cervix.  EBL: minimal  Complications: No  Tolerance: Pt tolerated procedure well and was in stable condition.  She was observed in the clinic for 15 min.     Follow up: Pt was instructed to call if fever, chills, heavy bleeding, severe cramping or foul smelling discharge. May take 600 mg ibuprofen QID prn for mild cramping.  She was advised to check the IUD strings monthly.  Recommended that she return in 1 month for IUD string check.    Resident: Nakia West  Faculty: Sheryl Bell MD present for and supervised this entire procedure.

## 2019-08-13 NOTE — PATIENT INSTRUCTIONS
IUD AFTERCARE INSTRUCTIONS     1. Uterine cramping is common after IUD placement. You can help relieve the discomfort with heating pads, Tylenol (acetaminophen), Aspirin or Advil (ibuprofen). If your cramping becomes very painful, please call the clinic.     2. Irregular bleeding and spotting is normal for the first few months after the IUD is placed. In some cases, women may experience irregular bleeding or spotting for up to six months after the IUD is placed. This bleeding can be annoying at first but usually will become lighter with the Mirena IUD quickly. Call the clinic if your bleeding is excessive and not getting better.     3. Your period will likely be shorter and lighter with a Mirena IUD. Approximately 40% of women will stop having periods altogether with the Mirena IUD. Your period may be heavier and longer with the Paragard IUD.     4. IUDs do not protect against sexually transmitted infections including the AIDS virus (HIV), warts (HPV), gonorrhea, Chlamydia, and herpes. Condoms should be used to decrease the risk sexually transmitted infections. If you think that you have been exposed to a sexually transmitted infection, please call the clinic.     5. If you had the IUD placed for birth control, the Paragard IUD is effective immediately. The Mirena IUD is effective immediately if it was inserted within seven days after the start of your period. If you have Mirena inserted at any other time during your menstrual cycle, use another method of birth control, like condoms for at least 7 days.     6. It is possible for the IUD to come out of the uterus. If it does slip out of place, it is most likely to happen in the first few months after being put in. To make sure your IUD is in place, you can feel for the IUD strings between periods. To check for strings, wash your hands. Then, sit or squat down. Place one finger into your vagina until you feel your cervix. It will feel hard and rubbery, like the end of  your nose. The string ends should be coming through your cervix. Do not pull on the strings. If the strings feel much longer than before, if you feel the hard plastic part of the IUD, or if you cannot feel the strings at all, the IUD may have moved out of place. Please call the clinic and consider using a back up form of birth control until you are seen.     7. Keep your follow-up appointment for 4-6 weeks after the IUD has been placed.     8. Pregnancy is unlikely after IUD placement, but can happen. If you have early pregnancy symptoms like nausea and vomiting, breast tenderness, frequent urination or abdominal pain, you can take a pregnancy test. Please call the clinic if you have any concerns or if your pregnancy test is positive.     9. The IUD should only be removed by a healthcare provider.     The Mirena IUD should be removed and/or replaced after 7 years.     The Paragard IUD should be removed and/or replaced after 12 years.     Warning Signs   Call the clinic if any of the following occurs:       Severe abdominal pain or cramping       Unusual bleeding       Fever or chills       Foul smelling vaginal discharge       Painful intercourse       Positive pregnancy test.         Scheduling:  If you have any concerns about today's visit or wish to schedule another appointment please call our office during normal business hours [phone] (8-5:00 M-F)  If a referral was made to a [facility] and you don't get a call from central scheduling please call [phone].  If a Mammogram was ordered for you at [facility] call [phone] to schedule or change your appointment.  If you had an XRay/CT/Ultrasound/MRI ordered the number is [phone] to schedule or change your radiology appointment.

## 2019-09-06 ENCOUNTER — TELEPHONE (OUTPATIENT)
Dept: FAMILY MEDICINE | Facility: CLINIC | Age: 24
End: 2019-09-06

## 2019-09-06 NOTE — TELEPHONE ENCOUNTER
"Request for medication refill: I do not see Ranitidine on med list please clarify.    Providers if patient needs an appointment and you are willing to give a one month supply please refill for one month and  send a letter/MyChart using \".SMILLIMITEDREFILL\" .smillimited and route chart to \"P SMI \" (Giving one month refill in non controlled medications is strongly recommended before denial)    If refill has been denied, meaning absolutely no refills without visit, please complete the smart phrase \".smirxrefuse\" and route it to the \"P SMI MED REFILLS\"  pool to inform the patient and the pharmacy.    Meilda Lopez, CMA        "

## 2019-11-19 ENCOUNTER — TELEPHONE (OUTPATIENT)
Dept: FAMILY MEDICINE | Facility: CLINIC | Age: 24
End: 2019-11-19

## 2019-11-19 NOTE — TELEPHONE ENCOUNTER
LVM to call direct line for assistance with scheduling.    Dacia Galan CMA  [facility] Coordinator

## 2020-03-01 ENCOUNTER — HEALTH MAINTENANCE LETTER (OUTPATIENT)
Age: 25
End: 2020-03-01

## 2020-08-26 ENCOUNTER — HOSPITAL ENCOUNTER (EMERGENCY)
Facility: CLINIC | Age: 25
Discharge: HOME | End: 2020-08-26
Attending: EMERGENCY MEDICINE | Admitting: EMERGENCY MEDICINE
Payer: COMMERCIAL

## 2020-08-26 ENCOUNTER — APPOINTMENT (OUTPATIENT)
Dept: GENERAL RADIOLOGY | Facility: CLINIC | Age: 25
End: 2020-08-26
Attending: EMERGENCY MEDICINE
Payer: COMMERCIAL

## 2020-08-26 VITALS
HEART RATE: 98 BPM | SYSTOLIC BLOOD PRESSURE: 96 MMHG | BODY MASS INDEX: 19.93 KG/M2 | RESPIRATION RATE: 25 BRPM | WEIGHT: 136.1 LBS | DIASTOLIC BLOOD PRESSURE: 59 MMHG | TEMPERATURE: 97.5 F | OXYGEN SATURATION: 95 %

## 2020-08-26 DIAGNOSIS — T40.2X1A OPIOID OVERDOSE, ACCIDENTAL OR UNINTENTIONAL, INITIAL ENCOUNTER (H): ICD-10-CM

## 2020-08-26 LAB
ALBUMIN SERPL-MCNC: 3.8 G/DL (ref 3.4–5)
ALP SERPL-CCNC: 166 U/L (ref 40–150)
ALT SERPL W P-5'-P-CCNC: 51 U/L (ref 0–50)
ANION GAP SERPL CALCULATED.3IONS-SCNC: 12 MMOL/L (ref 3–14)
APAP SERPL-MCNC: <2 MG/L (ref 10–20)
AST SERPL W P-5'-P-CCNC: 96 U/L (ref 0–45)
BASOPHILS # BLD AUTO: 0 10E9/L (ref 0–0.2)
BASOPHILS NFR BLD AUTO: 0 %
BILIRUB SERPL-MCNC: 0.8 MG/DL (ref 0.2–1.3)
BUN SERPL-MCNC: 16 MG/DL (ref 7–30)
CALCIUM SERPL-MCNC: 8 MG/DL (ref 8.5–10.1)
CHLORIDE SERPL-SCNC: 101 MMOL/L (ref 94–109)
CO2 SERPL-SCNC: 21 MMOL/L (ref 20–32)
CREAT SERPL-MCNC: 1.03 MG/DL (ref 0.52–1.04)
DIFFERENTIAL METHOD BLD: ABNORMAL
EOSINOPHIL # BLD AUTO: 0 10E9/L (ref 0–0.7)
EOSINOPHIL NFR BLD AUTO: 0 %
ERYTHROCYTE [DISTWIDTH] IN BLOOD BY AUTOMATED COUNT: 13.2 % (ref 10–15)
GFR SERPL CREATININE-BSD FRML MDRD: 76 ML/MIN/{1.73_M2}
GLUCOSE SERPL-MCNC: 316 MG/DL (ref 70–99)
HCT VFR BLD AUTO: 45.5 % (ref 35–47)
HGB BLD-MCNC: 13.9 G/DL (ref 11.7–15.7)
INTERPRETATION ECG - MUSE: NORMAL
LYMPHOCYTES # BLD AUTO: 6.3 10E9/L (ref 0.8–5.3)
LYMPHOCYTES NFR BLD AUTO: 23.3 %
MCH RBC QN AUTO: 29.8 PG (ref 26.5–33)
MCHC RBC AUTO-ENTMCNC: 30.5 G/DL (ref 31.5–36.5)
MCV RBC AUTO: 97 FL (ref 78–100)
MONOCYTES # BLD AUTO: 0 10E9/L (ref 0–1.3)
MONOCYTES NFR BLD AUTO: 0 %
NEUTROPHILS # BLD AUTO: 20.6 10E9/L (ref 1.6–8.3)
NEUTROPHILS NFR BLD AUTO: 76.7 %
PLATELET # BLD AUTO: 549 10E9/L (ref 150–450)
PLATELET # BLD EST: ABNORMAL 10*3/UL
POTASSIUM SERPL-SCNC: 3.8 MMOL/L (ref 3.4–5.3)
PROT SERPL-MCNC: 7.8 G/DL (ref 6.8–8.8)
RBC # BLD AUTO: 4.67 10E12/L (ref 3.8–5.2)
RBC MORPH BLD: NORMAL
SALICYLATES SERPL-MCNC: <2 MG/DL
SODIUM SERPL-SCNC: 134 MMOL/L (ref 133–144)
WBC # BLD AUTO: 26.9 10E9/L (ref 4–11)

## 2020-08-26 PROCEDURE — 93005 ELECTROCARDIOGRAM TRACING: CPT | Performed by: EMERGENCY MEDICINE

## 2020-08-26 PROCEDURE — 80053 COMPREHEN METABOLIC PANEL: CPT | Performed by: EMERGENCY MEDICINE

## 2020-08-26 PROCEDURE — 80329 ANALGESICS NON-OPIOID 1 OR 2: CPT | Performed by: EMERGENCY MEDICINE

## 2020-08-26 PROCEDURE — 85025 COMPLETE CBC W/AUTO DIFF WBC: CPT | Performed by: EMERGENCY MEDICINE

## 2020-08-26 PROCEDURE — 96375 TX/PRO/DX INJ NEW DRUG ADDON: CPT | Performed by: EMERGENCY MEDICINE

## 2020-08-26 PROCEDURE — 25000128 H RX IP 250 OP 636: Performed by: EMERGENCY MEDICINE

## 2020-08-26 PROCEDURE — 93010 ELECTROCARDIOGRAM REPORT: CPT | Mod: Z6 | Performed by: EMERGENCY MEDICINE

## 2020-08-26 PROCEDURE — 96374 THER/PROPH/DIAG INJ IV PUSH: CPT | Performed by: EMERGENCY MEDICINE

## 2020-08-26 PROCEDURE — 71045 X-RAY EXAM CHEST 1 VIEW: CPT

## 2020-08-26 PROCEDURE — 99291 CRITICAL CARE FIRST HOUR: CPT | Mod: 25 | Performed by: EMERGENCY MEDICINE

## 2020-08-26 PROCEDURE — 99285 EMERGENCY DEPT VISIT HI MDM: CPT | Mod: 25 | Performed by: EMERGENCY MEDICINE

## 2020-08-26 PROCEDURE — 25800030 ZZH RX IP 258 OP 636: Performed by: EMERGENCY MEDICINE

## 2020-08-26 PROCEDURE — 96361 HYDRATE IV INFUSION ADD-ON: CPT | Performed by: EMERGENCY MEDICINE

## 2020-08-26 RX ORDER — ONDANSETRON 2 MG/ML
4 INJECTION INTRAMUSCULAR; INTRAVENOUS ONCE
Status: COMPLETED | OUTPATIENT
Start: 2020-08-26 | End: 2020-08-26

## 2020-08-26 RX ORDER — ONDANSETRON 4 MG/1
4 TABLET, ORALLY DISINTEGRATING ORAL EVERY 8 HOURS PRN
Qty: 10 TABLET | Refills: 0 | Status: SHIPPED | OUTPATIENT
Start: 2020-08-26 | End: 2020-08-29

## 2020-08-26 RX ORDER — ONDANSETRON 2 MG/ML
4 INJECTION INTRAMUSCULAR; INTRAVENOUS ONCE
Status: DISCONTINUED | OUTPATIENT
Start: 2020-08-26 | End: 2020-08-26

## 2020-08-26 RX ORDER — KETOROLAC TROMETHAMINE 15 MG/ML
15 INJECTION, SOLUTION INTRAMUSCULAR; INTRAVENOUS ONCE
Status: COMPLETED | OUTPATIENT
Start: 2020-08-26 | End: 2020-08-26

## 2020-08-26 RX ADMIN — KETOROLAC TROMETHAMINE 15 MG: 15 INJECTION, SOLUTION INTRAMUSCULAR; INTRAVENOUS at 05:27

## 2020-08-26 RX ADMIN — ONDANSETRON 4 MG: 2 INJECTION INTRAMUSCULAR; INTRAVENOUS at 05:27

## 2020-08-26 RX ADMIN — SODIUM CHLORIDE 1000 ML: 9 INJECTION, SOLUTION INTRAVENOUS at 05:26

## 2020-08-26 NOTE — DISCHARGE INSTRUCTIONS
Please make an appointment to follow up with Your Primary Care Provider as soon as possible if you have any concerns.    Return to the Emergency Department if you have any further concerns.    As discussed, you may have inhaled some saliva or stomach contents into your lungs.  If you develop shortness of breath, fevers, cough fill the Augmentin prescription.  This is an antibiotic that will prevent infections    You can get help with substance use concerns. If you live in the [facility] we can help with treatment and recovery services.    Services include:    Drug and alcohol use evaluation  Detox (withdrawal management)  Help for pregnant women with substance use concerns  In-patient and out-patient treatment  Medication-assisted treatment  Help coordinating your care  Peer support from others in recovery  Supportive housing  Getting evaluated for drug and alcohol use (Rule 25)  If you need help paying for treatment you may be eligible for assistance.    You must be a [facility] resident or covered by [facility].    You must also meet one of these criteria:    Meet income guidelines  Be on a medical assistance [place]) fee-for-service program  Be on [facility]  For more information call [facility]  at [phone].

## 2020-08-26 NOTE — ED TRIAGE NOTES
"Pt snorted oxycodone and drank wine tonight. Boyfriend came home and pt was unresponsive/ medics gave 2 mg intranasal narcan to wake her. She is now cold but awake. Pt is complaining of being cold. She does not have any memory of what happened. She says she was not trying to hurt herself. She has been in treatment for heroin and oxy use in the past. She has not used drugs in \"a very long time\" before today. Boyfriend estimates it was 10 mg oxycodone  "

## 2020-08-26 NOTE — ED AVS SNAPSHOT
[facility], Emergency Department  [address] [place] [id number]  Phone:  [phone]                                    Blaire Owusu   MRN: [id number]    Department:  [facility], Emergency Department   Date of Visit:  8/26/2020           After Visit Summary Signature Page    I have received my discharge instructions, and my questions have been answered. I have discussed any challenges I see with this plan with the nurse or doctor.    ..........................................................................................................................................  Patient/Patient Representative Signature      ..........................................................................................................................................  Patient Representative Print Name and Relationship to Patient    ..................................................               ................................................  Date                                   Time    ..........................................................................................................................................  Reviewed by Signature/Title    ...................................................              ..............................................  Date                                               Time          22EPIC Rev 08/18

## 2020-08-26 NOTE — ED PROVIDER NOTES
ED Provider Note  [facility]      History     Chief Complaint   Patient presents with     Drug Overdose     HPI  Blaire Owusu is a 25 year old female who has a past medical history of depression, anxiety, opiate dependence presenting with overdose of oxycodone.  She states that she had a 30 mg tablet she bought from a friend.  She cut it in thirds and snorted 1 of them.  The boyfriend found the patient passed out and called medics.  She got 2 mg of intranasal Narcan.  Currently the patient denies any chest pain, shortness of breath, abdominal pain, nausea or vomiting.  She is not had diarrhea.  She is thirsty.  She says this is not an attempt to harm her self.  She had a cecil prior to doing this.  Denies any other drug use.  She does not use opioids for months.  Denies hallucinations or voices.  She denies ingesting any other medications.    Past Medical History  Past Medical History:   Diagnosis Date     Anxiety      Depression      Depressive disorder      Eating disorder Bulemia     Mutilations, self     Cutting     Past Surgical History:   Procedure Laterality Date     DENTAL SURGERY Bilateral 2012    Leslie teeth removal, all 4     citalopram (CELEXA) 20 MG tablet  ferrous sulfate (FE TABS) 325 (65 Fe) MG EC tablet  ibuprofen (ADVIL/MOTRIN) 600 MG tablet  Prenatal Vit-Fe Fumarate-FA (PRENATAL MULTIVITAMIN W/IRON) 27-0.8 MG tablet      Allergies   Allergen Reactions     Lactose GI Disturbance     Family History  Family History   Problem Relation Age of Onset     Hypertension Father      Social History   Social History     Tobacco Use     Smoking status: Former Smoker     Packs/day: 0.10     Years: 1.00     Pack years: 0.10     Last attempt to quit: 2018     Years since quittin.7     Smokeless tobacco: Never Used   Substance Use Topics     Alcohol use: Yes     Comment: bottle of Vodka/month     Drug use: Yes     Types: Marijuana, Opiates     Comment: extasy      Past  medical history, past surgical history, medications, allergies, family history, and social history were reviewed with the patient. No additional pertinent items.       Review of Systems  A complete review of systems was performed with pertinent positives and negatives noted in the HPI, and all other systems negative.    Physical Exam   BP: (!) 142/69  Pulse: 120  Temp: 97.5  F (36.4  C)  Weight: 61.7 kg (136 lb 1.6 oz)  SpO2: 96 %  Physical Exam  Physical Exam   Constitutional: oriented to person, place, and time. appears well-developed and well-nourished.   HENT:   Head: Normocephalic and atraumatic. No ecchymosis or hematomas noted.  No abrasions.  Neck: Normal range of motion. Supple neck.    Pulmonary/Chest: Effort normal. No respiratory distress.   Cardiac: No murmurs, rubs, gallops. RRR.  Abdominal: Abdomen soft, nontender, nondistended. No rebound tenderness.  MSK: Long bones without deformity or evidence of trauma  Neurological: alert and oriented to person, place, and time. Pupils 3 mm and reactive to light bilaterally.  No nystagmus.  No clonus.  No tongue fasciculations.  Moving all extremities.  Skin: Skin is warm and dry.   Psychiatric:  normal mood and affect.  behavior is normal. Thought content normal.     ED Course      Procedures             EKG Interpretation:      Interpreted by Garrick Hernandez MD  Time reviewed: 0230  Symptoms at time of EKG: ingestion   Rhythm: sinus tachycardia  Rate: Tachycardia  Axis: Normal  Ectopy: none  Conduction: normal  ST Segments/ T Waves: No ST-T wave changes  Q Waves: nonspecific  Comparison to prior: No old EKG available    Clinical Impression: No evidence of sodium channel blockade.  No elevation of the ST segment in aVR.  Normal intervals.                   Critical Care Addendum    My initial assessment, based on my review of prehospital provider report, review of nursing observations, review of vital signs, focused history and physical exam, established that  Blaire Owusu has respiratory insufficiency, which requires immediate intervention, and therefore she is critically ill.     After the initial assessment, the care team initiated multiple lab tests to provide stabilization care. Due to the critical nature of this patient, I reassessed nursing observations, vital signs, physical exam and review of cardiac rhythm monitor multiple times prior to her disposition.     Time also spent performing documentation and reviewing test results.     Critical care time (excluding teaching time and procedures): 35 minutes.        Results for orders placed or performed during the hospital encounter of 08/26/20   CBC with platelets differential     Status: Abnormal (In process)   Result Value Ref Range    WBC 26.9 (H) 4.0 - 11.0 10e9/L    RBC Count 4.67 3.8 - 5.2 10e12/L    Hemoglobin 13.9 11.7 - 15.7 g/dL    Hematocrit 45.5 35.0 - 47.0 %    MCV 97 78 - 100 fl    MCH 29.8 26.5 - 33.0 pg    MCHC 30.5 (L) 31.5 - 36.5 g/dL    RDW 13.2 10.0 - 15.0 %    Platelet Count 549 (H) 150 - 450 10e9/L    Diff Method PENDING      Medications - No data to display     Assessments & Plan (with Medical Decision Making)   MDM  Patient presented opioid overdose.  This was unintentional and accidental.  She had relapsed.  Vitals here show mild tachycardia which improved.  She does not have any shortness of breath.  She was not apneic or hypoventilating after 4 hours of observation.  She is requesting discharge with her boyfriend.  I do feel this is safe.  She ingested oxycodone which is now long-acting opioid.  She does not have any suicidal ideation or signs of psychosis.  She does have a possible infiltrate on x-ray, may have aspirated.  She has no cough, shortness of breath, fever.  White blood count elevated most likely due to stress response and Narcan use.  She is otherwise afebrile and does not feel ill.  Patient appears quite well, she is able to make decision for discharge.  She is given a Narcan  prescription and Zofran for any nausea or vomiting.  The patient was also given a prescription for Augmentin if she develops any symptoms of pneumonia, we discussed this and she is given written and oral return precautions.    I have reviewed the nursing notes. I have reviewed the findings, diagnosis, plan and need for follow up with the patient.    New Prescriptions    No medications on file       Final diagnoses:   Opioid overdose, accidental or unintentional, initial encounter (H)       --  Garrick Hernandez  [facility], EMERGENCY DEPARTMENT  8/26/2020     Garrick Hernandez MD  08/26/20 0559

## 2020-12-14 ENCOUNTER — HEALTH MAINTENANCE LETTER (OUTPATIENT)
Age: 25
End: 2020-12-14

## 2021-04-17 ENCOUNTER — HEALTH MAINTENANCE LETTER (OUTPATIENT)
Age: 26
End: 2021-04-17

## 2021-10-02 ENCOUNTER — HEALTH MAINTENANCE LETTER (OUTPATIENT)
Age: 26
End: 2021-10-02

## 2022-02-17 PROBLEM — O09.90 SUPERVISION OF HIGH RISK PREGNANCY, ANTEPARTUM: Status: ACTIVE | Noted: 2018-12-14

## 2022-04-19 ENCOUNTER — OFFICE VISIT (OUTPATIENT)
Dept: FAMILY MEDICINE | Facility: CLINIC | Age: 27
End: 2022-04-19
Payer: COMMERCIAL

## 2022-04-19 VITALS
WEIGHT: 163 LBS | SYSTOLIC BLOOD PRESSURE: 129 MMHG | HEIGHT: 69 IN | OXYGEN SATURATION: 98 % | HEART RATE: 94 BPM | TEMPERATURE: 98.5 F | DIASTOLIC BLOOD PRESSURE: 78 MMHG | BODY MASS INDEX: 24.14 KG/M2 | RESPIRATION RATE: 16 BRPM

## 2022-04-19 DIAGNOSIS — F31.70 BIPOLAR DISORDER IN FULL REMISSION, MOST RECENT EPISODE UNSPECIFIED TYPE (H): ICD-10-CM

## 2022-04-19 DIAGNOSIS — Z32.01 PREGNANCY TEST POSITIVE: Primary | ICD-10-CM

## 2022-04-19 LAB — HCG UR QL: POSITIVE

## 2022-04-19 PROCEDURE — 81025 URINE PREGNANCY TEST: CPT | Performed by: FAMILY MEDICINE

## 2022-04-19 PROCEDURE — 99214 OFFICE O/P EST MOD 30 MIN: CPT | Performed by: FAMILY MEDICINE

## 2022-04-19 RX ORDER — QUETIAPINE FUMARATE 100 MG/1
TABLET, FILM COATED ORAL
COMMUNITY
Start: 2021-05-26 | End: 2022-04-19

## 2022-04-19 RX ORDER — QUETIAPINE FUMARATE 300 MG/1
300 TABLET, FILM COATED ORAL AT BEDTIME
COMMUNITY
Start: 2022-04-19 | End: 2022-08-12 | Stop reason: DRUGHIGH

## 2022-04-19 RX ORDER — DIVALPROEX SODIUM 250 MG/1
125 TABLET, EXTENDED RELEASE ORAL
COMMUNITY
Start: 2021-05-20 | End: 2022-04-19 | Stop reason: SINTOL

## 2022-04-19 ASSESSMENT — ANXIETY QUESTIONNAIRES
1. FEELING NERVOUS, ANXIOUS, OR ON EDGE: SEVERAL DAYS
2. NOT BEING ABLE TO STOP OR CONTROL WORRYING: SEVERAL DAYS
IF YOU CHECKED OFF ANY PROBLEMS ON THIS QUESTIONNAIRE, HOW DIFFICULT HAVE THESE PROBLEMS MADE IT FOR YOU TO DO YOUR WORK, TAKE CARE OF THINGS AT HOME, OR GET ALONG WITH OTHER PEOPLE: SOMEWHAT DIFFICULT
GAD7 TOTAL SCORE: 4
5. BEING SO RESTLESS THAT IT IS HARD TO SIT STILL: NOT AT ALL
7. FEELING AFRAID AS IF SOMETHING AWFUL MIGHT HAPPEN: NOT AT ALL
6. BECOMING EASILY ANNOYED OR IRRITABLE: NOT AT ALL
3. WORRYING TOO MUCH ABOUT DIFFERENT THINGS: SEVERAL DAYS

## 2022-04-19 ASSESSMENT — PATIENT HEALTH QUESTIONNAIRE - PHQ9
5. POOR APPETITE OR OVEREATING: SEVERAL DAYS
SUM OF ALL RESPONSES TO PHQ QUESTIONS 1-9: 1

## 2022-04-19 NOTE — Clinical Note
New OB. Please assign and help make an appointment Has Bipolar DO. Stopped depakote today. Has a psychiatrist.

## 2022-04-19 NOTE — PROGRESS NOTES
"  Assessment & Plan     Pregnancy test positive  Weak positive UPT  LMP 3/15/22. GA 5wk+0d based on LMP  IUD removed 3/17/22  Positive home UPT 4/15/22    OB coordinator will call patient, assign provider  Already on prenatal vitamins    - HCG qualitative urine; Future  - HCG qualitative urine    Bipolar disorder in full remission, most recent episode unspecified type (H)  Has psychiatrist  Reviewed meds with pharmD  Stop depakote today  Continue seroquel 300 at bedtime   Continue celexa              Dima Paez MD  [facility] ISAAC Rudd is a 26 year old who presents for the following health issues     HPI   1. Pregnancy test  Pt here for confirmation of pregnancy. LMP 3/16/22. IUD removal 3/17/22. Has taken 4 pregnancy tests at home, all positive. Nausea and vomitting, headaches, bilateral lower back pain. Symptoms have improved slightly since the weekend.     Excited. Supportive spouse  Biggest concern are meds for BPD    2. H/o Bipolar disorder  On depakote, seroquel, celexa  Pt has a psychiatrist who suggested she talk to us about meds  Reviewed with pharmD - stop depakote            Review of Systems         Objective    /78   Pulse 94   Temp 98.5  F (36.9  C) (Oral)   Resp 16   Ht 1.76 m (5' 9.29\")   Wt 73.9 kg (163 lb)   LMP 03/16/2022 (Exact Date)   SpO2 98%   BMI 23.87 kg/m    Body mass index is 23.87 kg/m .     Physical Exam   GENERAL: healthy, alert and no distress  PSYCH: mentation appears normal, affect normal/bright    Results for orders placed or performed in visit on 04/19/22 (from the past 24 hour(s))   HCG qualitative urine   Result Value Ref Range    hCG Urine Qualitative Positive (A) Negative               "

## 2022-04-19 NOTE — PROGRESS NOTES
Clinical Pharmacy Consult:                                                    Blaire Owusu is a 26 year old female seen by Dr. Paez today    Reason for Consult: Medication used in pregnancy     Behavioral health Medication review use in pregnancy    Depakote:  ReproTox summary: Valproic acid use during pregnancy is associated with a 1-2% incidence of lumbar meningomyelocele. Other congenital anomalies have also been associated with valproic acid exposure during pregnancy. The prevalence of birth defects was reported to be higher than with other anticonvulsants, although there was statistical overlap with malformation rates of some of the comparator agents. Valproic acid exposure during pregnancy was associated with neurodevelopmental delay. (Dr. Dayton Edwards, director of the [facility], has testified for the defense in valproic acid litigation.)  Moore reference describes this agent at teratogenetic.        Seroquel:  ReproTox summary:: Based on experimental animal studies and limited human reports, quetiapine is not expected to increase the risk of congenital anomalies. (*Note: Mana Song M.D., a contributor to this summary, was on the speakers` bureau for Seroquel until 2009.)    Citalopram:  ReproTox summary:: Based on experimental animal studies and human reports, therapeutic use of citalopram or escitalopram is not expected to increase the risk of congenital anomalies. Use of serotonin reuptake inhibitors late in pregnancy can be associated with a mild transient  syndrome of central nervous system, motor, respiratory, and gastrointestinal signs. (*Special note: Mana Song MD, a contributor to this summary, has been a consultant to [employer] regarding escitalopram litigation.)      Plan:  1. Recommend discontinuation of Depakote today   2. Continue taking serouquel  3. May continue taking citalopram and evaluate general benefit.    Discussed with Dr. Paez today  in clinic.     Shea Pickard.D.

## 2022-04-20 ASSESSMENT — ANXIETY QUESTIONNAIRES: GAD7 TOTAL SCORE: 4

## 2022-04-21 ENCOUNTER — TELEPHONE (OUTPATIENT)
Dept: FAMILY MEDICINE | Facility: CLINIC | Age: 27
End: 2022-04-21
Payer: COMMERCIAL

## 2022-04-21 DIAGNOSIS — Z32.01 PREGNANCY TEST POSITIVE: Primary | ICD-10-CM

## 2022-04-21 NOTE — TELEPHONE ENCOUNTER
Confirmation of pregnancy visit: 4/19/22    LMP: 3/15/22  Gestational Age: 5w2d  Estimated Date of Delivery: 12/20/22    Dating US has been ordered. Please call patient to schedule US week of 5/4/22 (8 weeks gestation).     NOB may be scheduled after US is completed. May be scheduled same day, but US must be completed first.    Please schedule a 40 minute NOB Provider appointment with a provider listed below.     When calling to schedule NOB, please give scheduling preference to the following providers. If ok with male provider, please offer only male provider appointments (unless no availability for >2 weeks)    Male:  1. Javon  2.     Female:  1. Yuridia/Porfirio/Victorina  2. Winifred/Cristofer/Silvia    Other Notes:      Please document call and close encounter.    Michaela Hernadez RN

## 2022-04-27 NOTE — TELEPHONE ENCOUNTER
Patient scheduled for NOB on 05/09 w/ Dr. Vega.     Ultrasound scheduled for 05/04.     CHADWICK Orellana

## 2022-05-04 ENCOUNTER — ANCILLARY PROCEDURE (OUTPATIENT)
Dept: ULTRASOUND IMAGING | Facility: CLINIC | Age: 27
End: 2022-05-04
Attending: FAMILY MEDICINE
Payer: COMMERCIAL

## 2022-05-04 DIAGNOSIS — Z32.01 PREGNANCY TEST POSITIVE: ICD-10-CM

## 2022-05-04 PROCEDURE — 76801 OB US < 14 WKS SINGLE FETUS: CPT | Mod: GC | Performed by: RADIOLOGY

## 2022-05-04 PROCEDURE — 76817 TRANSVAGINAL US OBSTETRIC: CPT | Mod: GC | Performed by: RADIOLOGY

## 2022-05-09 ENCOUNTER — OFFICE VISIT (OUTPATIENT)
Dept: FAMILY MEDICINE | Facility: CLINIC | Age: 27
End: 2022-05-09
Payer: COMMERCIAL

## 2022-05-09 VITALS
TEMPERATURE: 98.2 F | RESPIRATION RATE: 16 BRPM | SYSTOLIC BLOOD PRESSURE: 111 MMHG | DIASTOLIC BLOOD PRESSURE: 71 MMHG | HEART RATE: 90 BPM | WEIGHT: 170.4 LBS | BODY MASS INDEX: 24.95 KG/M2 | OXYGEN SATURATION: 100 %

## 2022-05-09 DIAGNOSIS — F32.5 MAJOR DEPRESSION IN REMISSION (H): ICD-10-CM

## 2022-05-09 DIAGNOSIS — F31.70 BIPOLAR DISORDER IN FULL REMISSION, MOST RECENT EPISODE UNSPECIFIED TYPE (H): ICD-10-CM

## 2022-05-09 DIAGNOSIS — O09.91 ENCOUNTER FOR SUPERVISION OF HIGH RISK PREGNANCY IN FIRST TRIMESTER, ANTEPARTUM: Primary | ICD-10-CM

## 2022-05-09 DIAGNOSIS — F41.1 GAD (GENERALIZED ANXIETY DISORDER): ICD-10-CM

## 2022-05-09 DIAGNOSIS — O09.891: ICD-10-CM

## 2022-05-09 LAB
ABO/RH(D): NORMAL
ALBUMIN UR-MCNC: NEGATIVE MG/DL
APPEARANCE UR: CLEAR
BILIRUB UR QL STRIP: NEGATIVE
COLOR UR AUTO: YELLOW
ERYTHROCYTE [DISTWIDTH] IN BLOOD BY AUTOMATED COUNT: 14.6 % (ref 10–15)
GLUCOSE UR STRIP-MCNC: NEGATIVE MG/DL
HCT VFR BLD AUTO: 37.5 % (ref 35–47)
HGB BLD-MCNC: 12 G/DL (ref 11.7–15.7)
HGB UR QL STRIP: NEGATIVE
KETONES UR STRIP-MCNC: NEGATIVE MG/DL
LEUKOCYTE ESTERASE UR QL STRIP: NEGATIVE
MCH RBC QN AUTO: 29 PG (ref 26.5–33)
MCHC RBC AUTO-ENTMCNC: 32 G/DL (ref 31.5–36.5)
MCV RBC AUTO: 91 FL (ref 78–100)
NITRATE UR QL: NEGATIVE
PH UR STRIP: 7 [PH] (ref 5–8)
PLATELET # BLD AUTO: 434 10E3/UL (ref 150–450)
RBC # BLD AUTO: 4.14 10E6/UL (ref 3.8–5.2)
SP GR UR STRIP: 1.02 (ref 1–1.03)
SPECIMEN EXPIRATION DATE: NORMAL
UROBILINOGEN UR STRIP-ACNC: 0.2 E.U./DL
WBC # BLD AUTO: 14.3 10E3/UL (ref 4–11)

## 2022-05-09 PROCEDURE — 86780 TREPONEMA PALLIDUM: CPT | Performed by: STUDENT IN AN ORGANIZED HEALTH CARE EDUCATION/TRAINING PROGRAM

## 2022-05-09 PROCEDURE — H1001 ANTEPARTUM MANAGEMENT: HCPCS | Performed by: STUDENT IN AN ORGANIZED HEALTH CARE EDUCATION/TRAINING PROGRAM

## 2022-05-09 PROCEDURE — 87389 HIV-1 AG W/HIV-1&-2 AB AG IA: CPT | Performed by: STUDENT IN AN ORGANIZED HEALTH CARE EDUCATION/TRAINING PROGRAM

## 2022-05-09 PROCEDURE — 36415 COLL VENOUS BLD VENIPUNCTURE: CPT | Performed by: STUDENT IN AN ORGANIZED HEALTH CARE EDUCATION/TRAINING PROGRAM

## 2022-05-09 PROCEDURE — 87340 HEPATITIS B SURFACE AG IA: CPT | Performed by: STUDENT IN AN ORGANIZED HEALTH CARE EDUCATION/TRAINING PROGRAM

## 2022-05-09 PROCEDURE — 87086 URINE CULTURE/COLONY COUNT: CPT | Performed by: STUDENT IN AN ORGANIZED HEALTH CARE EDUCATION/TRAINING PROGRAM

## 2022-05-09 PROCEDURE — 0004A HC ADMIN COVID VAC PFIZER, BOOSTER: CPT | Performed by: STUDENT IN AN ORGANIZED HEALTH CARE EDUCATION/TRAINING PROGRAM

## 2022-05-09 PROCEDURE — 86762 RUBELLA ANTIBODY: CPT | Performed by: STUDENT IN AN ORGANIZED HEALTH CARE EDUCATION/TRAINING PROGRAM

## 2022-05-09 PROCEDURE — 86900 BLOOD TYPING SEROLOGIC ABO: CPT | Performed by: STUDENT IN AN ORGANIZED HEALTH CARE EDUCATION/TRAINING PROGRAM

## 2022-05-09 PROCEDURE — 85027 COMPLETE CBC AUTOMATED: CPT | Performed by: STUDENT IN AN ORGANIZED HEALTH CARE EDUCATION/TRAINING PROGRAM

## 2022-05-09 PROCEDURE — 81003 URINALYSIS AUTO W/O SCOPE: CPT | Performed by: STUDENT IN AN ORGANIZED HEALTH CARE EDUCATION/TRAINING PROGRAM

## 2022-05-09 PROCEDURE — 86787 VARICELLA-ZOSTER ANTIBODY: CPT | Performed by: STUDENT IN AN ORGANIZED HEALTH CARE EDUCATION/TRAINING PROGRAM

## 2022-05-09 PROCEDURE — 87591 N.GONORRHOEAE DNA AMP PROB: CPT | Performed by: STUDENT IN AN ORGANIZED HEALTH CARE EDUCATION/TRAINING PROGRAM

## 2022-05-09 PROCEDURE — 86901 BLOOD TYPING SEROLOGIC RH(D): CPT | Performed by: STUDENT IN AN ORGANIZED HEALTH CARE EDUCATION/TRAINING PROGRAM

## 2022-05-09 PROCEDURE — 87491 CHLMYD TRACH DNA AMP PROBE: CPT | Performed by: STUDENT IN AN ORGANIZED HEALTH CARE EDUCATION/TRAINING PROGRAM

## 2022-05-09 PROCEDURE — 99207 PR FIRST OB VISIT: CPT | Mod: 25 | Performed by: STUDENT IN AN ORGANIZED HEALTH CARE EDUCATION/TRAINING PROGRAM

## 2022-05-09 PROCEDURE — 86706 HEP B SURFACE ANTIBODY: CPT | Performed by: STUDENT IN AN ORGANIZED HEALTH CARE EDUCATION/TRAINING PROGRAM

## 2022-05-09 NOTE — PROGRESS NOTES
First Obstetric Visit         ODILIA Rudd is a 26 year old  female who presents for an initial prenatal visit at Unknown weeks of pregnancy.    LIEN is Dec 21, 2022 by LMP of Patient's last menstrual period was 2022 (exact date)..    By LMP, LIEN 22   Ultrasound 5/4 at 7w3d, putting LIEN 22    Concerns today: none  She has not had bleeding since her LMP.   She has had mild nausea. Bought OTC vitamin pops that help with nausea, had none in first pregnancy  Weight loss has not occurred.    This was a planned pregnancy; she and  are very excited    Depakote weaned off since confirmed pregnancy  Taking celexa and seroquel, sees psychiatry  No active current substance use    OB History/past pregnancies:       Labor Risk Assessment   Is the patient's age <18 or >40?     No  Patint's BMI is Body mass index is 24.95 kg/m .   Does patient have a BMI < 18.5?     No  Prior delivery within 6 months?      No  Ever delivered prior to 37 weeks gestation?  No, 37w1d  Pregnancy occur via In Vitro Fertilization?   No  Are you carrying twins?       No    The patient has the following additional risk factors for  labor:   Q13,15,17: History of Substance Abuse  Q20: Depression, bi-polar, anxiety, schizophrenia this pregnancy  as documented     Labor Risk Summary:  Pt is high risk for  Labor  Yes     Past Medical History  Past Medical History:   Diagnosis Date     Anxiety      Depression      Depressive disorder      Eating disorder Bulemia     Mutilations, self     Cutting     Do you have a history of any of the following medical conditions?    Condition Yes/No   Hypertension No   Heart disease, mitral valve prolapse, rheumatic fever No   Asthma or another chronic lung disease No   An autoimmune disorder No   Kidney disease No   Frequent urinary tract infections No   Migraine headaches No   Stroke, loss of sensation/function, seizures, or other neuro problem No   Diabetes  No   Thyroid problems or have you taken thyroid medication No   Hepatitis, liver disease, jaundice No   Blood clots, phlebitis, pulmonary embolism or varicose veins No   Excessive bleeding after surgery or dental work No   Heavy menstrual periods requiring treatment No   Anemia No   Blood transfusions No        Would you refuse a blood transfusion? No   Breast problems No   Abnormalities of the uterus No   Abnormal pap smear No   Have you been treated for an emotional disturbance? No   Have you been physically, sexually, or emotionally hurt by someone? No   Have you been in a major accident or suffered serious trauma? No   Have you had surgical procedures? No        Have you ever had any complications from anesthesia? No   Have you ever been hospitalized for a nonsurgical reason? No     Notes for positives: no    Prenatal Genetic Screening    Do you have a history of any of the following Yes/No        A metabolic disorder (e.g. Insulin-dependent DM, PKU) No        Recurrent pregnancy loss No     Do you, the baby's father, or anyone in your families have Yes/No        Thalassemia AND MCV <80 No        Hemophilia No        Neural tube defect No        Congenital heart defect No        Sickle cell disease or trait No        Muscular dystrophy No        Cystic fibrosis No        Mental retardation or autism No        Down's syndrome No        Beck-Sach's disease No        Johanna's chorea No        Any other inherited genetic or chromosomal disorder No        A child with birth defects not listed above No     Infection History    At high risk for coming in contact with HIV No   Ever treated for tuberculosis No   Ever received the BCG vaccine for tuberculosis Yes   Ever had genital herpes (or has your partner) No   Had a rash or viral illness since LMP No   Ever had a sexually transmitted infection No   Ever had chicken pox or the vaccine Yes   Ever had any other serious infectious disease No   Up to date with  immunizations Yes     Habits  Have you ever used tobacco products (cigarettes, chewing tobacco)? No, Do you currently use tobacco products? No. , Have you ever used alcohol? No, Have you ever used any other drugs? No    Current medications are:  Current Outpatient Medications   Medication Sig Dispense Refill     citalopram (CELEXA) 20 MG tablet Take 20 mg by mouth daily.       naloxone (NARCAN) 4 MG/0.1ML nasal spray Spray 1 spray (4 mg) into one nostril alternating nostrils as needed for opioid reversal every 2-3 minutes until assistance arrives 0.2 mL 0     Prenatal Vit-Fe Fumarate-FA (PRENATAL MULTIVITAMIN W/IRON) 27-0.8 MG tablet Take 1 tablet by mouth daily       QUEtiapine (SEROQUEL) 300 MG tablet Take 1 tablet (300 mg) by mouth At Bedtime       REVIEW OF SYSTEMS  ENT: NEGATIVE for ear, mouth and throat problems  CV: NEGATIVE for chest pain, palpitations or peripheral edema  GI: positive for intermittent nausea  : NEGATIVE for unusual urinary or vaginal symptoms. Periods are regular.  C: NEGATIVE for fever, chills, change in weight  I: NEGATIVE for worrisome rashes, moles or lesions  E: NEGATIVE for vision changes or irritation  R: NEGATIVE for significant cough or SOB  B: NEGATIVE for masses, tenderness or discharge  M: NEGATIVE for significant arthralgias or myalgia  N: NEGATIVE for weakness, dizziness or paresthesias  P: NEGATIVE for changes in mood or affect  ====================================================    PHYSICAL EXAM:  /71   Pulse 90   Temp 98.2  F (36.8  C) (Oral)   Resp 16   Wt 77.3 kg (170 lb 6.4 oz)   LMP 03/16/2022 (Exact Date)   SpO2 100%   BMI 24.95 kg/m         General: Alert and oriented, in no acute distress.  Skin: Warm and dry, no abnormalities noted.  Eyes: Extra-ocular muscles intact, pupils equal and reactive.  ENT: Speech intact, nasal passages open, no hearing impairment noted.  CV: No cyanosis or pallor, warm and well perfused.  Respiratory: No respiratory  distress, no accessory muscle use.  Neuro: Gait and station normal, comprehension intact. Gross and fine motor skills intact.   Psychiatric: Mood and affect appear normal.   Extremities: Warm, able to move all four extremities at will.    =========================================    ASSESSMENT & PLAN     26 year old  at 7w5d with LIEN of Dec 21, 2022 by LMP of Patient's last menstrual period was 2022 (exact date)..      Pregnancy Risk Assessment: High Risk pregnancy  - Ordered new OB labs  - Dating ultrasound completed May 5, 2022  - Discussed first trimester genetic screening (needs to be done by 11-14 weeks). Patient does want to pursue screening so [facility] referral plaeced  - Discussed screening for sickle cell anemia. Patient does not want to pursue Hb electrophoresis.   - Discussed early screening for gestational diabetes. The patient does not have a history of GDM so will not obtain early GCT.    - already taking Prenatal vitamins; not interested in B6, Unisom today but discussed as a future option if interested  - Flu shot out of season  - COVID booster today    Counseling given:   - Follow up in 5 weeks (at 12 weeks) for recheck, discuss results, discuss quad screen.   - Recommended weight gain for pregnancy:    BMI < 18.5  28-40 lbs   18.5 - 24.9 25-35   25 - 29.9 15-25   > 30  11-20  - Instructed on best evidence for: healthy diet and foods to avoid; exercise and activity during pregnancy; avoiding exposure to toxoplasmosis; safe use of seatbelts during pregnancy; and maintenance of a generally healthy lifestyle    Discussed the harms, benefits, side effects and alternative therapies for current prescribed and OTC medications.    Options for treatment and follow-up care were reviewed with the patient and/or guardian. Blaire Owusu and/or guardian engaged in the decision making process and verbalized understanding of the options discussed and agreed with the final plan.    Risk of  labor  in first trimester  MILES  Bipolar disorder in full remission  Major depression in full remission.  Q13,15,17: History of Substance Abuse, Q20: Depression, bi-polar, anxiety, schizophrenia this pregnancy as documented. Tapered off Depakote once found out she was pregnancy, currently on Seroquel 300mg at bedtime and Celexa 20mg daily. Feels stable on current medications and sees psychiatry for med management.  - will continue to monitor    Marily Vega,

## 2022-05-09 NOTE — PATIENT INSTRUCTIONS
Thank you for coming to [facility]!  - If you had lab testing today and your results are normal they will be be mailed to you or sent to your MyChart within seven days.   - If the lab tests need quick action we will call you with the results.  - The phone number we will call with results is # [phone] (home) . If this is not the best number please call our clinic and change the number.  - If any referrals were made to [facility] and you don't get a call, call central scheduling [phone]  - If you need any refills please call your pharmacy and they will contact us.  - If you had an XRay/CT/Ultrasound/MRI ordered the number is [phone] to schedule or change your appointment  - If you have any concerns about today's visit or wish to schedule another appointment please call our office [phone] (8-5:00 M-F)  - If you have urgent medical concerns call [phone] at any time of the day.  - If you have a medical emergency please call [phone].    Because you are pregnant, we have additional resources for you:  - You may call and ask to speak with one of our clinic nurses at [phone] during normal business hours, for non-urgent questions about your pregnancy.  - Immediate OB help is also available 24 hours a day, seven days a week via the [facility] ([facility]) - [phone]  located at [address] [place] [address]    Prenatal Timeline:  Before 14 weeks: dating ultrasound       This ultrasound helps us determine your dates accurately.  15 - 18 weeks: Optional genetic testing (quad screen)       This testing helps understand your baby's risk for some genetic abnormalities.  22 - 24 weeks: Ultrasound (fetal anatomy survey)       This testing will look for early growth abnormalities, and might tell the baby's sex.  24 - 28 weeks: One hour sugar test (GCT)        This test helps identify diabetes of pregnancy.  27 - 36 weeks: Tetanus  shot (Tdap)       This shot helps protect you and your baby from tetanus and whooping cough.  36 weeks and later: Group B Strep test (GBS)       This test helps predict if you need antibiotics in labor to prevent infection in your baby.  Anytime September to April: flu shot       This shot helps protect you and your family from the flu.  This is especially important during pregnancy!  Once every trimester: blood iron test (hemoglobin)       This test helps us know if you will need extra iron to support your baby.  At any time during or after your pregnancy: Some women experience baby blues.  We can help you with: Feeling anxious, Overwhelmed or sad, Trouble sleeping, Crying uncontrollably, Trouble caring for yourself or baby.    How frequently should you see your provider?  < 28 weeks: every 4 weeks  28-36 weeks: every 2 weeks  >36 weeks: every week    Call your healthcare provider immediately if you experience any of the following symptoms:  Bleeding from nipples, rectum, bladder, or coughing up blood  Vaginal bleeding, no matter how slight (unless small amount after a pelvic exam)  Swelling of hands or face  Dimness or blurring of vision  Severe or continuous headaches  Abdominal pains or contractions that do not go away with heat and rest or a bowel movement  Chills or fever over 100.4  Persistent vomiting  Painful or burning urination  Decrease in fetal movement  Sudden or slow escape of fluid from the vagina

## 2022-05-10 ENCOUNTER — TRANSCRIBE ORDERS (OUTPATIENT)
Dept: MATERNAL FETAL MEDICINE | Facility: CLINIC | Age: 27
End: 2022-05-10
Payer: COMMERCIAL

## 2022-05-10 DIAGNOSIS — O26.90 PREGNANCY RELATED CONDITION, ANTEPARTUM: Primary | ICD-10-CM

## 2022-05-10 PROBLEM — O09.899 RISK OF PRETERM LABOR: Status: ACTIVE | Noted: 2018-11-29

## 2022-05-10 PROBLEM — F50.9 EATING DISORDER: Status: ACTIVE | Noted: 2022-05-10

## 2022-05-10 PROBLEM — O09.91 ENCOUNTER FOR SUPERVISION OF HIGH RISK PREGNANCY IN FIRST TRIMESTER, ANTEPARTUM: Status: ACTIVE | Noted: 2018-12-14

## 2022-05-10 LAB
C TRACH DNA SPEC QL PROBE+SIG AMP: NEGATIVE
HBV SURFACE AB SERPL IA-ACNC: 63.27 M[IU]/ML
HBV SURFACE AG SERPL QL IA: NONREACTIVE
HIV 1+2 AB+HIV1 P24 AG SERPL QL IA: NONREACTIVE
N GONORRHOEA DNA SPEC QL NAA+PROBE: NEGATIVE
RUBV IGG SERPL QL IA: 2.3 INDEX
RUBV IGG SERPL QL IA: POSITIVE
T PALLIDUM AB SER QL: NONREACTIVE
VZV IGG SER QL IA: 1532 INDEX
VZV IGG SER QL IA: POSITIVE

## 2022-05-11 LAB — BACTERIA UR CULT: NORMAL

## 2022-05-14 ENCOUNTER — HEALTH MAINTENANCE LETTER (OUTPATIENT)
Age: 27
End: 2022-05-14

## 2022-06-06 ENCOUNTER — PRE VISIT (OUTPATIENT)
Dept: MATERNAL FETAL MEDICINE | Facility: HOSPITAL | Age: 27
End: 2022-06-06
Payer: COMMERCIAL

## 2022-06-09 ENCOUNTER — ANCILLARY PROCEDURE (OUTPATIENT)
Dept: ULTRASOUND IMAGING | Facility: HOSPITAL | Age: 27
End: 2022-06-09
Attending: OBSTETRICS & GYNECOLOGY
Payer: COMMERCIAL

## 2022-06-09 ENCOUNTER — OFFICE VISIT (OUTPATIENT)
Dept: MATERNAL FETAL MEDICINE | Facility: HOSPITAL | Age: 27
End: 2022-06-09
Attending: OBSTETRICS & GYNECOLOGY

## 2022-06-09 ENCOUNTER — OFFICE VISIT (OUTPATIENT)
Dept: FAMILY MEDICINE | Facility: CLINIC | Age: 27
End: 2022-06-09
Payer: COMMERCIAL

## 2022-06-09 ENCOUNTER — LAB (OUTPATIENT)
Dept: LAB | Facility: HOSPITAL | Age: 27
End: 2022-06-09
Attending: OBSTETRICS & GYNECOLOGY
Payer: COMMERCIAL

## 2022-06-09 VITALS
OXYGEN SATURATION: 96 % | WEIGHT: 167.2 LBS | DIASTOLIC BLOOD PRESSURE: 63 MMHG | HEART RATE: 91 BPM | SYSTOLIC BLOOD PRESSURE: 111 MMHG | HEIGHT: 69 IN | RESPIRATION RATE: 16 BRPM | BODY MASS INDEX: 24.76 KG/M2

## 2022-06-09 DIAGNOSIS — Z59.71 DOES NOT HAVE HEALTH INSURANCE: ICD-10-CM

## 2022-06-09 DIAGNOSIS — F32.5 MAJOR DEPRESSION IN REMISSION (H): ICD-10-CM

## 2022-06-09 DIAGNOSIS — O26.90 PREGNANCY RELATED CONDITION, ANTEPARTUM: ICD-10-CM

## 2022-06-09 DIAGNOSIS — F41.1 GAD (GENERALIZED ANXIETY DISORDER): ICD-10-CM

## 2022-06-09 DIAGNOSIS — O09.91 ENCOUNTER FOR SUPERVISION OF HIGH RISK PREGNANCY IN FIRST TRIMESTER, ANTEPARTUM: Primary | ICD-10-CM

## 2022-06-09 DIAGNOSIS — F31.70 BIPOLAR DISORDER IN FULL REMISSION, MOST RECENT EPISODE UNSPECIFIED TYPE (H): ICD-10-CM

## 2022-06-09 DIAGNOSIS — Z36.9 FIRST TRIMESTER SCREENING: Primary | ICD-10-CM

## 2022-06-09 DIAGNOSIS — O26.90 PREGNANCY RELATED CONDITION, ANTEPARTUM: Primary | ICD-10-CM

## 2022-06-09 DIAGNOSIS — Z36.9 FIRST TRIMESTER SCREENING: ICD-10-CM

## 2022-06-09 PROCEDURE — 76813 OB US NUCHAL MEAS 1 GEST: CPT | Mod: 26 | Performed by: OBSTETRICS & GYNECOLOGY

## 2022-06-09 PROCEDURE — 99207 PR NO CHARGE LOS: CPT | Performed by: OBSTETRICS & GYNECOLOGY

## 2022-06-09 PROCEDURE — 96040 HC GENETIC COUNSELING, EACH 30 MINUTES: CPT | Performed by: GENETIC COUNSELOR, MS

## 2022-06-09 PROCEDURE — 84163 PAPPA SERUM: CPT

## 2022-06-09 PROCEDURE — 36416 COLLJ CAPILLARY BLOOD SPEC: CPT

## 2022-06-09 PROCEDURE — 76813 OB US NUCHAL MEAS 1 GEST: CPT

## 2022-06-09 PROCEDURE — 99207 PR PRENATAL VISIT: CPT | Mod: GC | Performed by: STUDENT IN AN ORGANIZED HEALTH CARE EDUCATION/TRAINING PROGRAM

## 2022-06-09 ASSESSMENT — PATIENT HEALTH QUESTIONNAIRE - PHQ9
5. POOR APPETITE OR OVEREATING: NOT AT ALL
SUM OF ALL RESPONSES TO PHQ QUESTIONS 1-9: 0

## 2022-06-09 ASSESSMENT — ANXIETY QUESTIONNAIRES
6. BECOMING EASILY ANNOYED OR IRRITABLE: NOT AT ALL
2. NOT BEING ABLE TO STOP OR CONTROL WORRYING: NOT AT ALL
5. BEING SO RESTLESS THAT IT IS HARD TO SIT STILL: NOT AT ALL
3. WORRYING TOO MUCH ABOUT DIFFERENT THINGS: NOT AT ALL
1. FEELING NERVOUS, ANXIOUS, OR ON EDGE: NOT AT ALL
GAD7 TOTAL SCORE: 0
IF YOU CHECKED OFF ANY PROBLEMS ON THIS QUESTIONNAIRE, HOW DIFFICULT HAVE THESE PROBLEMS MADE IT FOR YOU TO DO YOUR WORK, TAKE CARE OF THINGS AT HOME, OR GET ALONG WITH OTHER PEOPLE: NOT DIFFICULT AT ALL
7. FEELING AFRAID AS IF SOMETHING AWFUL MIGHT HAPPEN: NOT AT ALL
GAD7 TOTAL SCORE: 0

## 2022-06-09 NOTE — PROGRESS NOTES
Preceptor Attestation:    I discussed the patient with the resident and evaluated the patient in person. I have verified the content of the note, which accurately reflects my assessment of the patient and the plan of care.   Supervising Physician:  Mert Parsons MD, MD.

## 2022-06-09 NOTE — PROGRESS NOTES
"Return OB visit  12-23 weeks    Subjective:   Blaire is a 27 year old  female at 12w1d who returns for prenatal care. LIEN Dec 21, 2022.  - Concerns today: none  - Patient reports no vaginal bleeding, no contractions/severe cramping, no leakage of fluid. Fetal movement is not present.   - some nausea/vomiting, \"subsided\" one episode on   - No heartburn.   - yes vaginal discharge, white clear, no itching. No dysuria.   - No headache, vision changes, lower extremity swelling, upper abdominal pain, chest pain, shortness of breath.   - Mood has been good (stopped meds, family noticed \"can be kindof mean\" but that has improved)  - genetic testing today, went well, \"incredibly informative\" wanting ultrasound and if anything popped up, chromosomal testing. Blood sample given and pending    Objective:   /63   Pulse 91   Resp 16   Ht 1.76 m (5' 9.29\")   Wt 75.8 kg (167 lb 3.2 oz)   LMP 2022 (Exact Date)   SpO2 96%   BMI 24.48 kg/m     Const: No distress  Abd: Gravid.   See flowsheet for FH, FHTs, edema     Prenatal labs:   -   Hemoglobin   Date Value Ref Range Status   2022 12.0 11.7 - 15.7 g/dL Final   2020 13.9 11.7 - 15.7 g/dL Final     Assessment & plan:   IUP at 12w1d weeks by LMP consistent with first trimester ultrasound.     - Prenatal labs reviewed: all wnl  - Pregnancy complicated by: MILES, MDD, bipolar disorder (see below)  - Discussed quad screen; saw [facility] this morning for genetic testing, US wnl, waiting on labs  - Pregnancy weight gain -3lbs, likely 2/2 recent nausea, activity working  - Provided counseling regarding  labor symptoms, depression and social support systems, breast feeding, contraception options after delivery. The patient plans to deliver at [facility] with myself and/or OB partner Collin Alejandro  - had copper IUD in the past (didn't like the bleeding), possible OCP after delivery    Breastfeeding education provided:  - Benefits of " Breastfeeding     - Patient will continue taking prenatal vitamins and avoiding cigarettes & alcohol.   - Return to clinic in 4 weeks (16w gestation)    Risk of  labor in first trimester  MILES  Bipolar disorder in full remission  Major depression in full remission.  Q13,15,17: History of Substance Abuse, Q20: Depression, bi-polar, anxiety, schizophrenia this pregnancy as documented. Tapered off Depakote once found out she was pregnancy, currently on Seroquel 300mg at bedtime and Celexa 20mg daily. Noticed mood change at work and home immediately after but is stable now. Knows to reach out on MyChart, call clinic if need assistance, emergency department if urgent.  - will continue to monitor    Does not have health insurance  Recently turned 27 and is off parents' insurance but there are issues with HR to get on 's insurance. Discussed [facility] today and would like support with social work  - SW consult placed    Options for treatment and follow-up care were reviewed with the patient and/or guardian. Blaire Owusu and/or guardian engaged in the decision making process and verbalized understanding of the options discussed and agreed with the final plan.    Marily Vega, DO

## 2022-06-09 NOTE — PATIENT INSTRUCTIONS
Thank you for coming to [facility]!  - If you had lab testing today and your results are normal they will be be mailed to you or sent to your MyChart within seven days.   - If the lab tests need quick action we will call you with the results.  - The phone number we will call with results is # [phone] (home) . If this is not the best number please call our clinic and change the number.  - If any referrals were made to [facility] and you don't get a call, call central scheduling [phone]  - If you need any refills please call your pharmacy and they will contact us.  - If you had an XRay/CT/Ultrasound/MRI ordered the number is [phone] to schedule or change your appointment  - If you have any concerns about today's visit or wish to schedule another appointment please call our office [phone] (8-5:00 M-F)  - If you have urgent medical concerns call [phone] at any time of the day.  - If you have a medical emergency please call [phone].    Because you are pregnant, we have additional resources for you:  - You may call and ask to speak with one of our clinic nurses at [phone] during normal business hours, for non-urgent questions about your pregnancy.  - Immediate OB help is also available 24 hours a day, seven days a week via the [facility] ([facility]) - [phone]  located at [address] [place] [address]    Prenatal Timeline:  Before 14 weeks: dating ultrasound       This ultrasound helps us determine your dates accurately.  15 - 18 weeks: Optional genetic testing (quad screen)       This testing helps understand your baby's risk for some genetic abnormalities.  22 - 24 weeks: Ultrasound (fetal anatomy survey)       This testing will look for early growth abnormalities, and might tell the baby's sex.  24 - 28 weeks: One hour sugar test (GCT)        This test helps identify diabetes of pregnancy.  27 - 36 weeks: Tetanus  shot (Tdap)       This shot helps protect you and your baby from tetanus and whooping cough.  36 weeks and later: Group B Strep test (GBS)       This test helps predict if you need antibiotics in labor to prevent infection in your baby.  Anytime September to April: flu shot       This shot helps protect you and your family from the flu.  This is especially important during pregnancy!  Once every trimester: blood iron test (hemoglobin)       This test helps us know if you will need extra iron to support your baby.  At any time during or after your pregnancy: Some women experience baby blues.  We can help you with: Feeling anxious, Overwhelmed or sad, Trouble sleeping, Crying uncontrollably, Trouble caring for yourself or baby.    How frequently should you see your provider?  < 28 weeks: every 4 weeks  28-36 weeks: every 2 weeks  >36 weeks: every week    Call your healthcare provider immediately if you experience any of the following symptoms:  Bleeding from nipples, rectum, bladder, or coughing up blood  Vaginal bleeding, no matter how slight (unless small amount after a pelvic exam)  Swelling of hands or face  Dimness or blurring of vision  Severe or continuous headaches  Abdominal pains or contractions that do not go away with heat and rest or a bowel movement  Chills or fever over 100.4  Persistent vomiting  Painful or burning urination  Decrease in fetal movement  Sudden or slow escape of fluid from the vagina

## 2022-06-09 NOTE — PROGRESS NOTES
"[facility]  Genetic Counseling Consult    Patient: Blaire Owusu YOB: 1995   Date of Service: 22      Blaire (pronounced \"Rice -a\") Dominique Owusu was seen at [facility] for genetic consultation to discuss the options for routine screening for fetal chromosome abnormalities.       Impression/Plan:   1.  Blaire had an ultrasound and blood draw for first trimester screening.  Results are expected within 3-5 days, and will be available in [employer].  We will contact her to discuss the results, and a copy will be forwarded to the office of the referring OB provider. Blaire requested a detailed message with results on her voicemail ([phone]).     2. Maternal serum AFP (single marker screen) is recommended after 15 weeks to screen for open neural tube defects. A quad screen should not be performed.    Pregnancy History:   /Parity:    Age at Delivery: 27 year old  LIEN: 2022, by Last Menstrual Period  Gestational Age: 12w1d    No significant complications or exposures were reported in the current pregnancy.    Pregnancy history:  o 2019: term, , male    Medical History:   Blaire is currently taking Seroquel and Celexa for treatment of bipolar disorder.  WE discussed that even if certain medications have been associated with adverse fetal outcome, the benefits may still outweigh the risks and this can only be determined by her health care providers.  Prenatal exposure to Seroquel and Celexa has not been associated with an increased risk for congenital anomalies.  Family History:   A three-generation pedigree was obtained, and is scanned under the  Media  tab.   The following significant findings were reported by Blaire:    Blaire's mother had a history of multiple pregnancy losses due to an unknown etiology.    Otherwise, the reported family history is negative for stillbirths, birth defects, intellectual " disability, known genetic conditions, and consanguinity.       Carrier Screening:   The patient reports that she is [place] and  ancestry and her partner is of  and Mediterranean ancestry    Expanded carrier screening for mutations in a large panel of genes associated with autosomal recessive conditions including cystic fibrosis, spinal muscular atrophy, and others, is now available.The patient has declined the carrier screening options reviewed today.       Risk Assessment for Chromosome Conditions:   We explained that the risk for fetal chromosome abnormalities increases with maternal age. We discussed specific features of common chromosome abnormalities, including Down syndrome, trisomy 13, trisomy 18, and sex chromosome trisomies.      At age 27 at delivery, the midtrimester risk to have a baby with Down syndrome is 1 in 990.    At age 27 at delivery, the midtrimester risk to have a baby with any chromosome abnormality is 1 in 495.        Testing Options:   We discussed the following options:   First trimester screening    First trimester ultrasound with nuchal translucency and nasal bone assessments, maternal plasma hCG, ARJUN-A, and AFP measurement    Screens for fetal trisomy 21, trisomy 13, and trisomy 18    Cannot screen for open neural tube defects; maternal serum AFP after 15 weeks is recommended     Non-invasive Prenatal Testing (NIPT)    Maternal plasma cell-free DNA testing; first trimester ultrasound with nuchal translucency and nasal bone assessment is recommended, when appropriate    Screens for fetal trisomy 21, trisomy 13, trisomy 18, and sex chromosome aneuploidy    Cannot screen for open neural tube defects; maternal serum AFP after 15 weeks is recommended     Chorionic villus sampling (CVS)    Invasive procedure typically performed in the first trimester by which placental villi are obtained for the purpose of chromosome analysis and/or other prenatal genetic  analysis    Diagnostic results; >99% sensitivity for fetal chromosome abnormalities    Cannot test for open neural tube defects; maternal serum AFP after 15 weeks is recommended     Genetic Amniocentesis    Invasive procedure typically performed in the second trimester by which amniotic fluid is obtained for the purpose of chromosome analysis and/or other prenatal genetic analysis    Diagnostic results; >99% sensitivity for fetal chromosome abnormalities    AFAFP measurement tests for open neural tube defects     Comprehensive (Level II) ultrasound: Detailed ultrasound performed between 18-22 weeks gestation to screen for major birth defects and markers for aneuploidy.    We reviewed the benefits and limitations of this testing.  Screening tests provide a risk assessment specific to the pregnancy for certain fetal chromosome abnormalities, but cannot definitively diagnose or exclude a fetal chromosome abnormality.  Follow-up genetic counseling and consideration of diagnostic testing is recommended with any abnormal screening result.      It was a pleasure to be involved with Ry s care. Face-to-face time of the meeting was 40 minutes.    Tavia Sanchez MS, [facility]  Maternal Fetal Medicine  [facility]  Phone:[phone]

## 2022-06-09 NOTE — PROGRESS NOTES
"Please see \"Imaging\" tab under \"Chart Review\" for details of today's US.    Giuliana Swanson, DO    "

## 2022-06-10 ENCOUNTER — TELEPHONE (OUTPATIENT)
Dept: CARE COORDINATION | Facility: CLINIC | Age: 27
End: 2022-06-10
Payer: COMMERCIAL

## 2022-06-10 NOTE — TELEPHONE ENCOUNTER
"Referral thru [facility]. Patient needing Insurance Assistance.    CC contacted patient, per patient \"I am recently off of my Mother's insurance and need to get on my own as I am 27 years old and pregnant.  CC informed patient about [employer], explained that she can contact them by telephone or, go into the office and receive help applying for insurance right thru to the County/State. Patient wrote down all information and thanked CC for the call.    [facility] [place] [address]  Phone: [phone]  Hours: Monday to Friday 9:00 am - 5:00 pm      Victoria Garces  Care Coordinator  [facility]  [phone]    "

## 2022-06-14 ENCOUNTER — TELEPHONE (OUTPATIENT)
Dept: MATERNAL FETAL MEDICINE | Facility: HOSPITAL | Age: 27
End: 2022-06-14
Payer: COMMERCIAL

## 2022-06-14 LAB — SCANNED LAB RESULT: NORMAL

## 2022-06-14 NOTE — TELEPHONE ENCOUNTER
Left a message for Blaire regarding her first trimester screening results. These results indicated a 1 in >10,000 risk for Down syndrome and a 1 in >10,000 risk for trisomy 18/13.  This screening test gives information about the risk of some chromosome conditions in a pregnancy, but does not definitively diagnose or exclude the presence of these chromosome conditions.  A copy of these results are available in her EPIC chart for her primary OB to review. Maternal serum AFP only is recommended in the second trimester to screen for neural tube defects.    Tavia Sanchez MS,[facility]  Maternal Fetal Medicine  [phone]

## 2022-07-08 ENCOUNTER — OFFICE VISIT (OUTPATIENT)
Dept: FAMILY MEDICINE | Facility: CLINIC | Age: 27
End: 2022-07-08
Payer: COMMERCIAL

## 2022-07-08 VITALS
WEIGHT: 172.6 LBS | TEMPERATURE: 98.2 F | HEART RATE: 89 BPM | RESPIRATION RATE: 16 BRPM | OXYGEN SATURATION: 98 % | HEIGHT: 70 IN | DIASTOLIC BLOOD PRESSURE: 73 MMHG | SYSTOLIC BLOOD PRESSURE: 118 MMHG | BODY MASS INDEX: 24.71 KG/M2

## 2022-07-08 DIAGNOSIS — F31.70 BIPOLAR DISORDER IN FULL REMISSION, MOST RECENT EPISODE UNSPECIFIED TYPE (H): ICD-10-CM

## 2022-07-08 DIAGNOSIS — O09.92 ENCOUNTER FOR SUPERVISION OF HIGH RISK PREGNANCY IN SECOND TRIMESTER, ANTEPARTUM: Primary | ICD-10-CM

## 2022-07-08 PROBLEM — Z59.71 DOES NOT HAVE HEALTH INSURANCE: Status: RESOLVED | Noted: 2022-06-09 | Resolved: 2022-07-08

## 2022-07-08 PROBLEM — Z36.89 ENCOUNTER FOR TRIAGE IN PREGNANT PATIENT: Status: RESOLVED | Noted: 2019-06-25 | Resolved: 2022-07-08

## 2022-07-08 PROCEDURE — 99207 PR PRENATAL VISIT: CPT | Mod: GC | Performed by: STUDENT IN AN ORGANIZED HEALTH CARE EDUCATION/TRAINING PROGRAM

## 2022-07-08 NOTE — PATIENT INSTRUCTIONS
Patient Education   Here is the plan from today's visit    1. Encounter for supervision of high risk pregnancy in first trimester, antepartum    - US OB > 14 Weeks; Future    300mg currently  200 mg for 10 days  150mg for 10 days  100mg for 10 days  75mg for 10 days   50mg for 7 days  25mg for 7 days  stop      Thank you for coming to [facility]!  - If you had lab testing today and your results are normal they will be be mailed to you or sent to your MyChart within seven days.   - If the lab tests need quick action we will call you with the results.  - The phone number we will call with results is # [phone] (home) . If this is not the best number please call our clinic and change the number.  - If any referrals were made to [facility] and you don't get a call, call central scheduling [phone]  - If you need any refills please call your pharmacy and they will contact us.  - If you had an XRay/CT/Ultrasound/MRI ordered the number is [phone] to schedule or change your appointment  - If you have any concerns about today's visit or wish to schedule another appointment please call our office [phone] (8-5:00 M-F)  - If you have urgent medical concerns call [phone] at any time of the day.  - If you have a medical emergency please call [phone].    Because you are pregnant, we have additional resources for you:  - You may call and ask to speak with one of our clinic nurses at [phone] during normal business hours, for non-urgent questions about your pregnancy.  - Immediate OB help is also available 24 hours a day, seven days a week via the [facility] ([facility]) - [phone]  located at [address] [place] [address]    Prenatal Timeline:  Before 14 weeks: dating ultrasound       This ultrasound helps us determine your dates accurately.  15 - 18 weeks: Optional genetic testing (quad screen)       This testing helps  understand your baby's risk for some genetic abnormalities.  22 - 24 weeks: Ultrasound (fetal anatomy survey)       This testing will look for early growth abnormalities, and might tell the baby's sex.  24 - 28 weeks: One hour sugar test (GCT)        This test helps identify diabetes of pregnancy.  27 - 36 weeks: Tetanus shot (Tdap)       This shot helps protect you and your baby from tetanus and whooping cough.  36 weeks and later: Group B Strep test (GBS)       This test helps predict if you need antibiotics in labor to prevent infection in your baby.  Anytime September to April: flu shot       This shot helps protect you and your family from the flu.  This is especially important during pregnancy!  Once every trimester: blood iron test (hemoglobin)       This test helps us know if you will need extra iron to support your baby.  At any time during or after your pregnancy: Some women experience baby blues.  We can help you with: Feeling anxious, Overwhelmed or sad, Trouble sleeping, Crying uncontrollably, Trouble caring for yourself or baby.    How frequently should you see your provider?  < 28 weeks: every 4 weeks  28-36 weeks: every 2 weeks  >36 weeks: every week    Call your healthcare provider immediately if you experience any of the following symptoms:  Bleeding from nipples, rectum, bladder, or coughing up blood  Vaginal bleeding, no matter how slight (unless small amount after a pelvic exam)  Swelling of hands or face  Dimness or blurring of vision  Severe or continuous headaches  Abdominal pains or contractions that do not go away with heat and rest or a bowel movement  Chills or fever over 100.4  Persistent vomiting  Painful or burning urination  Decrease in fetal movement  Sudden or slow escape of fluid from the vagina              Please call or return to clinic if your symptoms don't go away.    Follow up plan  No follow-ups on file.    Thank you for coming to [facility] today.  Lab Testing:  **If  you had lab testing today and your results are reassuring or normal they will be mailed to you or sent through medineering within 7 days.   **If the lab tests need quick action we will call you with the results.  **If you are having labs done on a different day, please call [phone] to schedule at [facility] or [phone] for other [facility]patient Lab locations. Labs do not offer walk-in appointments.  The phone number we will call with results is # [phone] (home) . If this is not the best number please call our clinic and change the number.  Medication Refills:  If you need any refills please call your pharmacy and they will contact us.   If you need to  your refill at a new pharmacy, please contact the new pharmacy directly. The new pharmacy will help you get your medications transferred faster.   Scheduling:  If you have any concerns about today's visit or wish to schedule another appointment please call our office during normal business hours [phone] (8-5:00 M-F)  If a referral was made to an [facility] specialty provider and you do not get a call from central scheduling, please refer to directions on your visit summary or call our office during normal business hours for assistance.   If a Mammogram was ordered for you at the Breast Center call [phone] to schedule or change your appointment.  If you had an XRay/CT/Ultrasound/MRI ordered the number is [phone] to schedule or change your radiology appointment.   [facility] has limited ultrasound appointments available on Wednesdays, if you would like your ultrasound at [facility], please call [phone] to schedule.   Medical Concerns:  If you have urgent medical concerns please call [phone] at any time of the day.    Marily Vega, DO

## 2022-07-08 NOTE — PROGRESS NOTES
"Return OB visit  12-23 weeks    Subjective:   Blaire is a 27 year old  female at 16w2d who returns for prenatal care. LIEN Dec 21, 2022.  - Concerns today: none  - Patient reports no vaginal bleeding, no contractions/severe cramping, no leakage of fluid. Fetal movement is present. Describes shifting sensation after poking her abdomen and occasional flutters.   - Had episode of nausea/vomiting with associated migraine from -7/3. Was seen in the urgency room on 7/3 for migraine. Received medications for this and symptoms have not returned.    - Has had heartburn for the last 1.5 weeks. Attributes this to eating spicy foods. Feels relief when sitting upright 30 min after eating.   - Occasional vaginal discharge. Creamy white in color. No itching. No dysuria.   - No vision changes, lower extremity swelling, upper abdominal pain, chest pain, shortness of breath.     - Mood has been good. Seen by her psychiatrist who is interested in weaning her off of Seroquel due to breast feeding . Has been taking this since . She is interested in coming off of this and hopes to breastfeed this pregnancy. Goal for this is 2 years. Current dose is 300 mg. Was not taking Seroquel during first pregnancy.    Endorses good social support at home.   Currently on her 's health insurance.     Objective:   /73   Pulse 89   Temp 98.2  F (36.8  C) (Oral)   Resp 16   Ht 1.765 m (5' 9.5\")   Wt 78.3 kg (172 lb 9.6 oz)   LMP 2022 (Exact Date)   SpO2 98%   BMI 25.12 kg/m     Const: No distress  Abd: Gravid.   See flowsheet for FH, FHTs, edema     Prenatal labs:   -   Hemoglobin   Date Value Ref Range Status   2022 12.0 11.7 - 15.7 g/dL Final   2020 13.9 11.7 - 15.7 g/dL Final     Assessment & plan:   IUP at 16w2d weeks by LMP consistent with first trimester ultrasound.     - Prenatal labs reviewed: all wnl  - Pregnancy complicated by: MILES, MDD, bipolar disorder  -Had MFM nuchal translucency ultrasound " on 22. This was wnl. Has appt on 22 with MFM for comprehensive ultrasound.  - Pregnancy weight gain +5 lbs to date, which is adequate.     - Provided counseling regarding  labor symptoms, depression and social support systems, breast feeding. The patient plans to deliver at [facility] with myself and/or OB partner Collin Alejandro.     - Patient will continue taking prenatal vitamins and avoiding cigarettes & alcohol.   - Return to clinic in 4 weeks. (20w gestation)  -She has appt with MFM on 22 for comprehensive US    Breastfeeding education provided:    Bipolar disorder in full remission  Blaire discontinued Depakote per psychiatrist when she found out she was pregnant.  She has continued to discuss with psychiatrist pregnancy and breast-feeding on Seroquel; understands category C and feels that she would like to initiate taper to see if she can tolerate it.  Goal is breast-feeding for 2 years after delivery if she is able.  She is currently on 300 mg of Seroquel and would like to initiate taper today.  We will connect with her psychiatrist to let them know that this is started.  Because she is off her Depakote, we will do a slow taper (see below) advised patient that should she or her  notice worsening of symptoms, maintain current dose, stop taper, and follow-up with PCP or psychiatrist.  - will need lower pill dosage at next visit (has 100s and 200s)  300mg currently  200 mg for 10 days  150mg for 10 days  100mg for 10 days  75mg for 10 days   50mg for 7 days  25mg for 7 days  stop    Options for treatment and follow-up care were reviewed with the patient and/or guardian. Blaire Owusu and/or guardian engaged in the decision making process and verbalized understanding of the options discussed and agreed with the final plan.    Liz Galvez, MS3    I was present with the medical student who participated in the service and in the documentation of this note. I have verified the  history and personally performed the physical exam and medical decision making. I have verified and edited the note, which accurately reflects my assessment of the patient and the plan of care.    Marily Vega, DO   she/her  PGY-3

## 2022-07-08 NOTE — PROGRESS NOTES
Preceptor Attestation:   Patient seen, evaluated and discussed with the resident. I have verified the content of the note, which accurately reflects my assessment of the patient and the plan of care.   Supervising Physician:  Pancho Sanchez MD

## 2022-07-21 ENCOUNTER — OFFICE VISIT (OUTPATIENT)
Dept: MATERNAL FETAL MEDICINE | Facility: HOSPITAL | Age: 27
End: 2022-07-21
Attending: OBSTETRICS & GYNECOLOGY
Payer: COMMERCIAL

## 2022-07-21 ENCOUNTER — ANCILLARY PROCEDURE (OUTPATIENT)
Dept: ULTRASOUND IMAGING | Facility: HOSPITAL | Age: 27
End: 2022-07-21
Attending: OBSTETRICS & GYNECOLOGY
Payer: COMMERCIAL

## 2022-07-21 DIAGNOSIS — O35.9XX0 TERATOGEN EXPOSURE IN CURRENT PREGNANCY, SINGLE OR UNSPECIFIED FETUS: Primary | ICD-10-CM

## 2022-07-21 DIAGNOSIS — F19.11 HISTORY OF SUBSTANCE ABUSE (H): ICD-10-CM

## 2022-07-21 DIAGNOSIS — O26.90 PREGNANCY RELATED CONDITION, ANTEPARTUM: ICD-10-CM

## 2022-07-21 PROCEDURE — 76811 OB US DETAILED SNGL FETUS: CPT

## 2022-07-21 PROCEDURE — 99207 PR NO CHARGE LOS: CPT | Performed by: OBSTETRICS & GYNECOLOGY

## 2022-07-21 PROCEDURE — 76811 OB US DETAILED SNGL FETUS: CPT | Mod: 26 | Performed by: OBSTETRICS & GYNECOLOGY

## 2022-07-21 NOTE — PROGRESS NOTES
"Please see \"Imaging\" tab under Chart Review for full details.    Marnie Arredondo MD  Maternal Fetal Medicine    "

## 2022-08-06 NOTE — PROGRESS NOTES
"Return OB visit  12-23 weeks    Subjective:   Blaire is a 27 year old  female at 20w2d who returns for prenatal care. LIEN Dec 21, 2022.  - Concerns today: none  - pain on R side once after work that went away  - Patient reports no vaginal bleeding, , no leakage of fluid. Fetal movement is present.   - Some nausea/vomiting but not too much. Heartburn- not taking any meds and sitting up after eating. Doesn't feel like she's at the point yet that she needs Tums or a medication  - No vaginal discharge. No dysuria.   - No headache, vision changes, lower extremity swelling, upper abdominal pain, chest pain, shortness of breath.   - Mood has been good.    Objective:   /71   Pulse 89   Temp 98.2  F (36.8  C) (Oral)   Ht 1.754 m (5' 9.06\")   Wt 78.8 kg (173 lb 12.8 oz)   LMP 2022 (Exact Date)   SpO2 97%   BMI 25.62 kg/m     Const: No distress  Abd: Gravid.   See flowsheet for FH, FHTs, edema     Prenatal labs:   -   Hemoglobin   Date Value Ref Range Status   2022 12.0 11.7 - 15.7 g/dL Final   2020 13.9 11.7 - 15.7 g/dL Final     Assessment & plan:   IUP at 20w2d weeks by LMP consistent with first trimester ultrasound.     - Pregnancy complicated by: psychiatric medication use, bipolar disorder, MDD, MILES  - Reviewed quad screen and fetal survey results with patient: yes  - Discussed screening for gestational diabetes at 24-28 weeks.  - Pregnancy weight gain has been 10lbs to date, which is adequate.   - Provided counseling regarding  labor symptoms, depression and social support systems, breast feeding, contraception options after delivery. The patient plans to deliver at [facility] with myself and/or OB partner Dr. Alejandro. She has not been seen by Dr. Alejandro yet so we recommended her next appt be with them    - Patient will continue taking prenatal vitamins and avoiding cigarettes & alcohol.   - Return to clinic in 4 weeks.    - Specific concerns addressed today:       Bipolar " disorder in full remission  MFM US at 18w1d normal, recommending repeat US at 28w d/t medication. Currently working on Seroquel taper (Cat C) per patient, me and psychiatrist. Going slow, started at 300mg daily and is now at 100mg daily. Pt reports going well, no significant changes in sleep, mood. Is stressed about work. Off by a day or two so adjusted to the schedule below. Goal is to breastfeed for two years after delivery.  75mg for 10 days   50mg for 7 days  25mg for 7 days  Last dose 9/4  - continue Celexa 20mg daily    Heartburn  Intermittent, does not feel that she's at the point she needs medication.   - encouraged lifestyle modification like not laying down right after eating, avoiding spicy foods, etc.     Options for treatment and follow-up care were reviewed with the patient and/or guardian. Blaire Owusu and/or guardian engaged in the decision making process and verbalized understanding of the options discussed and agreed with the final plan.    Marily Vega, DO   she/her  PGY-3  Family Medicine

## 2022-08-11 ENCOUNTER — OFFICE VISIT (OUTPATIENT)
Dept: FAMILY MEDICINE | Facility: CLINIC | Age: 27
End: 2022-08-11
Payer: COMMERCIAL

## 2022-08-11 VITALS
BODY MASS INDEX: 25.74 KG/M2 | HEIGHT: 69 IN | HEART RATE: 89 BPM | OXYGEN SATURATION: 97 % | TEMPERATURE: 98.2 F | WEIGHT: 173.8 LBS | DIASTOLIC BLOOD PRESSURE: 71 MMHG | SYSTOLIC BLOOD PRESSURE: 111 MMHG

## 2022-08-11 DIAGNOSIS — F31.70 BIPOLAR DISORDER IN FULL REMISSION, MOST RECENT EPISODE UNSPECIFIED TYPE (H): ICD-10-CM

## 2022-08-11 DIAGNOSIS — O26.899 HEARTBURN DURING PREGNANCY, ANTEPARTUM: ICD-10-CM

## 2022-08-11 DIAGNOSIS — O09.92 ENCOUNTER FOR SUPERVISION OF HIGH RISK PREGNANCY IN SECOND TRIMESTER, ANTEPARTUM: Primary | ICD-10-CM

## 2022-08-11 DIAGNOSIS — R12 HEARTBURN DURING PREGNANCY, ANTEPARTUM: ICD-10-CM

## 2022-08-11 PROCEDURE — 99207 PR PRENATAL VISIT: CPT | Mod: GC | Performed by: STUDENT IN AN ORGANIZED HEALTH CARE EDUCATION/TRAINING PROGRAM

## 2022-08-11 RX ORDER — QUETIAPINE FUMARATE 200 MG/1
200 TABLET, FILM COATED ORAL AT BEDTIME
COMMUNITY
Start: 2022-07-19 | End: 2022-08-12 | Stop reason: DRUGHIGH

## 2022-08-11 RX ORDER — QUETIAPINE FUMARATE 100 MG/1
100 TABLET, FILM COATED ORAL DAILY
COMMUNITY
Start: 2022-07-19 | End: 2022-08-12 | Stop reason: DRUGHIGH

## 2022-08-11 NOTE — Clinical Note
Juaquin Swenson, I forgot to mention to Blaire that her GDM screening could be at her 24 or 28w visit. Since she scheduled with Festus on 9/9, are you able to contact her to see if she has time for the glucose test at that visit?  Thanks, Marily

## 2022-08-11 NOTE — Clinical Note
Festus, You're seeing Blaire at her next OB visit as you're one of her OB partners. She's super sweet, one of my favs. Currently on a V slow Seroquel taper with me, should be off for a couple of days before she sees you. Can you make sure to check in on mood symptoms (she's doing really well)? Thanks, Marily

## 2022-08-11 NOTE — PATIENT INSTRUCTIONS
75mg for 10 days   50mg for 7 days  25mg for 7 days  Last dose 9/4    Thank you for coming to [facility]!  - If you had lab testing today and your results are normal they will be be mailed to you or sent to your MyChart within seven days.   - If the lab tests need quick action we will call you with the results.  - The phone number we will call with results is # [phone] (home) . If this is not the best number please call our clinic and change the number.  - If any referrals were made to [facility] and you don't get a call, call central scheduling [phone]  - If you need any refills please call your pharmacy and they will contact us.  - If you had an XRay/CT/Ultrasound/MRI ordered the number is [phone] to schedule or change your appointment  - If you have any concerns about today's visit or wish to schedule another appointment please call our office [phone] (8-5:00 M-F)  - If you have urgent medical concerns call [phone] at any time of the day.  - If you have a medical emergency please call [phone].    Because you are pregnant, we have additional resources for you:  - You may call and ask to speak with one of our clinic nurses at [phone] during normal business hours, for non-urgent questions about your pregnancy.  - Immediate OB help is also available 24 hours a day, seven days a week via the [facility] ([facility]) - [phone]  located at [address] [place], [place] [id number]    Prenatal Timeline:  Before 14 weeks: dating ultrasound       This ultrasound helps us determine your dates accurately.  15 - 18 weeks: Optional genetic testing (quad screen)       This testing helps understand your baby's risk for some genetic abnormalities.  22 - 24 weeks: Ultrasound (fetal anatomy survey)       This testing will look for early growth abnormalities, and might tell the baby's sex.  24 - 28 weeks: One hour sugar test (GCT)         This test helps identify diabetes of pregnancy.  27 - 36 weeks: Tetanus shot (Tdap)       This shot helps protect you and your baby from tetanus and whooping cough.  36 weeks and later: Group B Strep test (GBS)       This test helps predict if you need antibiotics in labor to prevent infection in your baby.  Anytime September to April: flu shot       This shot helps protect you and your family from the flu.  This is especially important during pregnancy!  Once every trimester: blood iron test (hemoglobin)       This test helps us know if you will need extra iron to support your baby.  At any time during or after your pregnancy: Some women experience baby blues.  We can help you with: Feeling anxious, Overwhelmed or sad, Trouble sleeping, Crying uncontrollably, Trouble caring for yourself or baby.    How frequently should you see your provider?  < 28 weeks: every 4 weeks  28-36 weeks: every 2 weeks  >36 weeks: every week    Call your healthcare provider immediately if you experience any of the following symptoms:  Bleeding from nipples, rectum, bladder, or coughing up blood  Vaginal bleeding, no matter how slight (unless small amount after a pelvic exam)  Swelling of hands or face  Dimness or blurring of vision  Severe or continuous headaches  Abdominal pains or contractions that do not go away with heat and rest or a bowel movement  Chills or fever over 100.4  Persistent vomiting  Painful or burning urination  Decrease in fetal movement  Sudden or slow escape of fluid from the vagina

## 2022-08-12 RX ORDER — QUETIAPINE FUMARATE 25 MG/1
TABLET, FILM COATED ORAL
Qty: 51 TABLET | Refills: 0 | Status: SHIPPED | OUTPATIENT
Start: 2022-08-12 | End: 2022-10-21

## 2022-09-03 ENCOUNTER — HEALTH MAINTENANCE LETTER (OUTPATIENT)
Age: 27
End: 2022-09-03

## 2022-09-07 NOTE — PROGRESS NOTES
"Return OB visit  24-28 weeks    Subjective:   Blaire is a 27 year old  female at 25w3d who returns for prenatal care. LIEN Dec 21, 2022.  - Concerns today: Routine pre-  - Patient reports no vaginal bleeding, no contractions, no leakage of fluid. Fetal movement is present.   - No nausea/vomiting. Some heartburn but managable.   - No vaginal discharge. No dysuria.   - No headache, vision changes, lower extremity swelling, upper abdominal pain, chest pain, shortness of breath.     Other medical concerns  Bipolar Disorder - On Seroquel  MDD - on Celexa    Has been on a Seroquel taper in conjunction w/ primary and psychiatry    Last dose was yesterday 50mg - forgot to go down on the proper scheduled - so has been on 50mg since      Mood is doing well right now, no emergence of worsening anxiety / agitation / \"feeling on edge\"    Currently seeing psychiatry and therapy thru \"[facility]\" - plan is to manage current conditions through therapy peripartum and postpartum     Still currently taking Celexa 20mg daily     MFM    Last visit  at 18w1d - no anomlies were noted    Next visit , recommended repeating d/t Seroquel usage/taper    Objective:   BP 99/62   Pulse 91   Temp 98.4  F (36.9  C) (Oral)   Resp 16   Ht 1.765 m (5' 9.5\")   Wt 82.1 kg (181 lb)   LMP 2022 (Exact Date)   SpO2 98%   BMI 26.35 kg/m     Const: No distress  Abd: Gravid.   See flowsheet for FH, FHTs, edema.    Prenatal labs:   Hemoglobin   Date Value Ref Range Status   2022 10.5 (L) 11.7 - 15.7 g/dL Final   2020 13.9 11.7 - 15.7 g/dL Final       Assessment & plan:   IUP at 25w3d weeks by LMP consistent with first trimester ultrasound.     Pregnancy complicated by:   - Psychiatric Medication Use  - Bipolar Disorder  - MDD and MILES     Prenatal labs were reviewed  - O Positive (collected 2022), RH+ status, therefore Rhogam not indicated   - Second Trimester Hgb was ordered today - was 10.5, iron studies pending   - " 1 Hour GCT was ordered today - passed today   - Anti-Treponema was ordered today - pending     Counseling  - Discussed TDap - will get at visit   - Weight gain - appropriate  Wt Readings from Last 4 Encounters:   22 82.1 kg (181 lb)   22 78.8 kg (173 lb 12.8 oz)   22 78.3 kg (172 lb 9.6 oz)   22 75.8 kg (167 lb 3.2 oz)     - MFM - visit scheduled  for repeat ultrasound d/t Seroquel use   - Provided counseling regarding  labor symptoms, depression and social support systems, breast feeding, contraception options after delivery, baby supplies and car seat, proper seatbelt use during pregnancy. The patient plans to deliver at [facility] with myself and/or OB partner Dr. Vega.  care at [facility].   - Patient will continue taking prenatal vitamins and avoiding cigarettes & alcohol.     Bipolar Disorder  Seroquel Taper  Unfortunately she forgot to progress to her taper as scheduled, so we will be finishing the taper later than expected ( instead of ). Will prescribe a week of 50mg and a week of 25mg to complete this tapering. She is scheduled to see MFM on  for a repeat ultrasound in context of Seroquel use. Continue to use Celexa.     Back Pain  SI Joint Pain  Sees chiropractor weekly, finds them helpful. Taught pelvic decompression stretch in clinic today that she can also perform at home. Continue gentle yoga and gentle chiropractic as wanted to help w/ pain relief.       Return to clinic in 4 weeks.     Options for treatment and follow-up care were reviewed with the patient and/or guardian. Blaire Owusu and/or guardian engaged in the decision making process and verbalized understanding of the options discussed and agreed with the final plan.    Collin Alejandro DO  PGY2 Family Medicine Resident   [facility]    Department of Family Medicine and [facility]

## 2022-09-09 ENCOUNTER — OFFICE VISIT (OUTPATIENT)
Dept: FAMILY MEDICINE | Facility: CLINIC | Age: 27
End: 2022-09-09
Payer: COMMERCIAL

## 2022-09-09 VITALS
RESPIRATION RATE: 16 BRPM | HEIGHT: 70 IN | TEMPERATURE: 98.4 F | WEIGHT: 181 LBS | DIASTOLIC BLOOD PRESSURE: 62 MMHG | BODY MASS INDEX: 25.91 KG/M2 | HEART RATE: 91 BPM | OXYGEN SATURATION: 98 % | SYSTOLIC BLOOD PRESSURE: 99 MMHG

## 2022-09-09 DIAGNOSIS — O09.92 ENCOUNTER FOR SUPERVISION OF HIGH RISK PREGNANCY IN SECOND TRIMESTER, ANTEPARTUM: Primary | ICD-10-CM

## 2022-09-09 DIAGNOSIS — O99.012 ANEMIA DURING PREGNANCY IN SECOND TRIMESTER: ICD-10-CM

## 2022-09-09 DIAGNOSIS — F31.70 BIPOLAR DISORDER IN FULL REMISSION, MOST RECENT EPISODE UNSPECIFIED TYPE (H): ICD-10-CM

## 2022-09-09 DIAGNOSIS — Z23 NEED FOR PROPHYLACTIC VACCINATION AND INOCULATION AGAINST INFLUENZA: ICD-10-CM

## 2022-09-09 LAB
FERRITIN SERPL-MCNC: 21 NG/ML (ref 6–175)
GLUCOSE 1H P 50 G GLC PO SERPL-MCNC: 64 MG/DL (ref 70–129)
HGB BLD-MCNC: 10.5 G/DL (ref 11.7–15.7)
IRON BINDING CAPACITY (ROCHE): 454 UG/DL (ref 240–430)
IRON SATN MFR SERPL: 6 % (ref 15–46)
IRON SERPL-MCNC: 27 UG/DL (ref 37–145)
T PALLIDUM AB SER QL: NONREACTIVE
TRANSFERRIN SERPL-MCNC: 369 MG/DL (ref 200–360)

## 2022-09-09 PROCEDURE — 99207 PR PRENATAL VISIT: CPT | Mod: 25 | Performed by: STUDENT IN AN ORGANIZED HEALTH CARE EDUCATION/TRAINING PROGRAM

## 2022-09-09 PROCEDURE — 82728 ASSAY OF FERRITIN: CPT | Performed by: STUDENT IN AN ORGANIZED HEALTH CARE EDUCATION/TRAINING PROGRAM

## 2022-09-09 PROCEDURE — 36415 COLL VENOUS BLD VENIPUNCTURE: CPT | Performed by: STUDENT IN AN ORGANIZED HEALTH CARE EDUCATION/TRAINING PROGRAM

## 2022-09-09 PROCEDURE — 83540 ASSAY OF IRON: CPT | Performed by: STUDENT IN AN ORGANIZED HEALTH CARE EDUCATION/TRAINING PROGRAM

## 2022-09-09 PROCEDURE — 82950 GLUCOSE TEST: CPT | Performed by: STUDENT IN AN ORGANIZED HEALTH CARE EDUCATION/TRAINING PROGRAM

## 2022-09-09 PROCEDURE — 84466 ASSAY OF TRANSFERRIN: CPT | Performed by: STUDENT IN AN ORGANIZED HEALTH CARE EDUCATION/TRAINING PROGRAM

## 2022-09-09 PROCEDURE — 90471 IMMUNIZATION ADMIN: CPT | Performed by: STUDENT IN AN ORGANIZED HEALTH CARE EDUCATION/TRAINING PROGRAM

## 2022-09-09 PROCEDURE — 86780 TREPONEMA PALLIDUM: CPT | Performed by: STUDENT IN AN ORGANIZED HEALTH CARE EDUCATION/TRAINING PROGRAM

## 2022-09-09 PROCEDURE — 85018 HEMOGLOBIN: CPT | Performed by: STUDENT IN AN ORGANIZED HEALTH CARE EDUCATION/TRAINING PROGRAM

## 2022-09-09 PROCEDURE — 90686 IIV4 VACC NO PRSV 0.5 ML IM: CPT | Performed by: STUDENT IN AN ORGANIZED HEALTH CARE EDUCATION/TRAINING PROGRAM

## 2022-09-09 RX ORDER — QUETIAPINE FUMARATE 25 MG/1
TABLET, FILM COATED ORAL
Qty: 21 TABLET | Refills: 0 | Status: SHIPPED | OUTPATIENT
Start: 2022-09-09 | End: 2022-10-21

## 2022-09-09 NOTE — PATIENT INSTRUCTIONS
Schedule of Routine Prenatal Appointments and Tests    PLAN FOR TODAY  - I will send 50mg of Seroquel for 7 days, stop this Sunday the 11th  - Then take 25mg for a week, stop on the 18th  -       First Trimester: Prenatal appointments should be scheduled at least every 4 weeks or as recommended by your doctor.    6-8 weeks (2nd month of pregnancy):  First prenatal visit is approximately 2 hour visit which may include: prenatal interview by nurse, complete physical exam by doctor (brest, pelvic exam and pap smear if needed), prenatal blood work (blood type, antibody screen, hemoglobin, hepatitis B screen, syphilis, HIV, and rubella titer)    8-12 weeks (2nd to 3rd months of pregnancy): Dating Ultrasound. Options of early genetic screening.    Second Trimester: Prenatal appointments should be scheduled at least every 4 weeks or as recommended by your doctor.    16-20 weeks (4th-5th month of pregnancy): AFP 4 marker screen (quad screen). It is an optional blood test to check for neural tube defects and Down s Syndrome.  For women that will be 35 years old or older at the time of delivery, further testing such as referral to Maternal Fetal Medicine to have level 2 ultrasound, genetic counseling and amniocentesis may be offered.    20-22 weeks (5th month of pregnancy): ultrasound to check fetal anatomy and growth. May also be able to find out if having a boy or girl.    24-28 weeks (6th-7th month of pregnancy): Blood work for gestational diabetes screening, hemoglobin, and repeat syphilis testing.    Third Trimester: Prenatal appointments should be scheduled every 2 weeks between 28-36 weeks (7th-9th month of pregnancy) and then every week until delivery.     28 weeks (7th month of pregnancy): TDaP injection will be given.  If blood type is Rh negative, an antibody screen will also be drawn and you will be given a Rhogam injection.    36-37 weeks (9th month of pregnancy): Group B streptococcus screen is performed with a  pelvic exam.  Birth plan is discussed with the provider.    Thank you for coming to [facility] today.  Lab Testing:  **If you had lab testing today and your results are reassuring or normal they will be mailed to you or sent through [employer] within 7 days.   **If the lab tests need quick action we will call you with the results.  The phone number we will call with results is # [phone] (home) . If this is not the best number please call our clinic and change the number.  Medication Refills:  If you need any refills please call your pharmacy and they will contact us.   If you need to  your refill at a new pharmacy, please contact the new pharmacy directly. The new pharmacy will help you get your medications transferred faster.   Scheduling:  If you have any concerns about today's visit or wish to schedule another appointment please call our office during normal business hours [phone] (8-5:00 M-F)  If a referral was made to a [facility] and you don't get a call from central scheduling please call [phone].  If a Mammogram was ordered for you at [facility] call [phone] to schedule or change your appointment.  If you had an XRay/CT/Ultrasound/MRI ordered the number is [phone] to schedule or change your radiology appointment.   Medical Concerns:  If you have urgent medical concerns please call [phone] at any time of the day.    Collin Alejandro, DO

## 2022-09-09 NOTE — Clinical Note
Jaylin  Saw our mutual.  She is doing well, no complaints. Though she forgot to follow the tapering scheduled for seroquel, so she will actually complete her tapter on 9/11 (and not 9/4 as scheduled). This will still be completed before she sees [facility] again, so this works out.   Taught her some OMT to do at home / w/ partner for pelvic pain.  Passed her GCT. Her Hgb is a tad low (10.5) - I am getting iron studies and will follow up with her over MyC for that. Grabbed anti trep. Since she is RH+, doesn't need Rhogam.   - Festus

## 2022-09-12 NOTE — PROGRESS NOTES
Preceptor Attestation:   Patient seen, evaluated and discussed with the resident. I have verified the content of the note, which accurately reflects my assessment of the patient and the plan of care.   Supervising Physician:  Malgorzata Bryson, DO

## 2022-09-29 ENCOUNTER — ANCILLARY PROCEDURE (OUTPATIENT)
Dept: ULTRASOUND IMAGING | Facility: HOSPITAL | Age: 27
End: 2022-09-29
Attending: OBSTETRICS & GYNECOLOGY
Payer: COMMERCIAL

## 2022-09-29 ENCOUNTER — OFFICE VISIT (OUTPATIENT)
Dept: MATERNAL FETAL MEDICINE | Facility: HOSPITAL | Age: 27
End: 2022-09-29
Attending: OBSTETRICS & GYNECOLOGY
Payer: COMMERCIAL

## 2022-09-29 DIAGNOSIS — F19.11 HISTORY OF SUBSTANCE ABUSE (H): ICD-10-CM

## 2022-09-29 DIAGNOSIS — O35.9XX0 TERATOGEN EXPOSURE IN CURRENT PREGNANCY, SINGLE OR UNSPECIFIED FETUS: Primary | ICD-10-CM

## 2022-09-29 PROCEDURE — 76816 OB US FOLLOW-UP PER FETUS: CPT

## 2022-09-29 PROCEDURE — 76816 OB US FOLLOW-UP PER FETUS: CPT | Mod: 26 | Performed by: OBSTETRICS & GYNECOLOGY

## 2022-09-29 PROCEDURE — 99207 PR NO CHARGE LOS: CPT | Performed by: OBSTETRICS & GYNECOLOGY

## 2022-09-29 NOTE — PROGRESS NOTES
Please see full imaging report from View[employer] program under imaging tab.    Richard Hernandez MD  Maternal Fetal Medicine

## 2022-10-12 NOTE — PROGRESS NOTES
"Return OB visit  29-34 weeks    Subjective:   Blaire is a 27 year old  female at 30w1d who returns for prenatal care. LIEN Dec 21, 2022.    - Concerns today: No concerns  - Patient reports no vaginal bleeding, no leakage of fluid. Contractions sometimes, just tightness, no longer than a few seconds, random. Fetal movement is present.   - No nausea/vomiting. No heartburn.   - No vaginal discharge. No dysuria.   - No headache, vision changes, lower extremity swelling, upper abdominal pain, chest pain, BUT SOME shortness of breath when walking around a lot   - Mood has been \"good\" - \"better than I thought I would be than I thought without my Seroquel\"    Other medical concerns  Bipolar Disorder - on Seroquel, now off since 2022  MDD - on Celexa    Has been on a Seroquel taper in conjunction w/ primary and psychiatry    Finished taper on 2022     Currently seeing psychiatry and therapy thru \"[facility]\" - plan is to manage current conditions through therapy peripartum and postpartum     Still currently taking Celexa 20mg daily     Iron Deficiency Anemia    Hgb  10.5 -> iron studies show iron deficiency    I informed patient to start taking an iron supplement alongside her PNV w/ iron     Started taking it     Taking it every night - tolerating it well, takes it at night on a full stomach     MFM    Last visit  at 28w1d - no anomlies were noted    Now since successfully weaned from Seroquel - no further need to follow up with MFM     GERD    Last two weeks it has worsened - hard to eat rosen meals    Also a lot more work responsibilities recently    Also knows that her appetitie was better when she was on Seroquel     Nurition - lot of whole grains / white and red meats / lots of veggies and fruits / water only       Objective:   /65   Pulse 87   Temp 98.2  F (36.8  C) (Oral)   Ht 1.765 m (5' 9.49\")   Wt 81.5 kg (179 lb 9.6 oz)   LMP 2022 (Exact Date)   SpO2 96%   BMI 26.15 kg/m  "    Const: No distress  Abd: Gravid.   See flowsheet for FH, FHTs, fetal presentation, edema.    Prenatal labs:   Hemoglobin   Date Value Ref Range Status   09/09/2022 10.5 (L) 11.7 - 15.7 g/dL Final   08/26/2020 13.9 11.7 - 15.7 g/dL Final       Assessment & plan:     Encounter for supervision of high risk pregnancy in third trimester, antepartum  IUP at 30w1d weeks by LMP consistent with first trimester ultrasound.     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 Interesting to note the diagnosis of Bipolar disorder and the use of selective serotonin reuptake inhibitor. She is actively followed by Psychiatry and Psychology.  - Continue to use Celexa.     Low Back Pain  SI Joint Pain  The OMT I prescribed at our last visit has been very helpful. She is still seeing her chiropractor as well who has been helpful. Continue these gentle therapies as needed  for pain relief.     Iron Deficiency Anemia   Tolerating her iron pills - though is noting some worsening GERD (could be related). Continue taking this.     GERD  2lb Weight loss   Noting worsening GERD making PO a tad more difficult. No overt vomiting, however. We will trial Pepcid 40mg to see if this offers some relief for her acid reflux. I also instructed her to purchase B6/Unisom OTC which can also help as well.     -Breastfeeding education provided:  - Supporting a non-medicated birth  - Skin to Skin  - Non-separation of mother and baby    Options for treatment and follow-up care were reviewed with the patient and/or guardian. Blaire Owusu and/or guardian engaged in the decision making process and verbalized understanding of the options discussed and agreed with the final plan. Provided counseling regarding  labor symptoms, depression and social support systems, breast feeding, contraception options after delivery, baby supplies and car seat, proper seatbelt use during pregnancy. The patient plans to deliver at [facility] with myself and/or OB partner Dr. Vega. [facility] care at [facility].     - Return to clinic in 2 weeks.     Collin Alejandro, DO

## 2022-10-13 ENCOUNTER — OFFICE VISIT (OUTPATIENT)
Dept: FAMILY MEDICINE | Facility: CLINIC | Age: 27
End: 2022-10-13
Payer: COMMERCIAL

## 2022-10-13 VITALS
SYSTOLIC BLOOD PRESSURE: 103 MMHG | HEART RATE: 87 BPM | TEMPERATURE: 98.2 F | OXYGEN SATURATION: 96 % | HEIGHT: 69 IN | DIASTOLIC BLOOD PRESSURE: 65 MMHG | BODY MASS INDEX: 26.6 KG/M2 | WEIGHT: 179.6 LBS

## 2022-10-13 DIAGNOSIS — M54.50 BILATERAL LOW BACK PAIN WITHOUT SCIATICA, UNSPECIFIED CHRONICITY: ICD-10-CM

## 2022-10-13 DIAGNOSIS — E61.1 IRON DEFICIENCY: ICD-10-CM

## 2022-10-13 DIAGNOSIS — O99.013 ANEMIA AFFECTING PREGNANCY IN THIRD TRIMESTER: ICD-10-CM

## 2022-10-13 DIAGNOSIS — O21.9 NAUSEA AND VOMITING IN PREGNANCY: ICD-10-CM

## 2022-10-13 DIAGNOSIS — F32.5 MAJOR DEPRESSION IN REMISSION (H): ICD-10-CM

## 2022-10-13 DIAGNOSIS — O09.93 ENCOUNTER FOR SUPERVISION OF HIGH RISK PREGNANCY IN THIRD TRIMESTER, ANTEPARTUM: Primary | ICD-10-CM

## 2022-10-13 PROCEDURE — 99207 PR PRENATAL VISIT: CPT | Mod: 25 | Performed by: STUDENT IN AN ORGANIZED HEALTH CARE EDUCATION/TRAINING PROGRAM

## 2022-10-13 PROCEDURE — 0124A COVID-19,PF,PFIZER BOOSTER BIVALENT: CPT | Performed by: STUDENT IN AN ORGANIZED HEALTH CARE EDUCATION/TRAINING PROGRAM

## 2022-10-13 PROCEDURE — 90471 IMMUNIZATION ADMIN: CPT | Performed by: STUDENT IN AN ORGANIZED HEALTH CARE EDUCATION/TRAINING PROGRAM

## 2022-10-13 PROCEDURE — 90715 TDAP VACCINE 7 YRS/> IM: CPT | Performed by: STUDENT IN AN ORGANIZED HEALTH CARE EDUCATION/TRAINING PROGRAM

## 2022-10-13 PROCEDURE — 91312 COVID-19,PF,PFIZER BOOSTER BIVALENT: CPT | Performed by: STUDENT IN AN ORGANIZED HEALTH CARE EDUCATION/TRAINING PROGRAM

## 2022-10-13 RX ORDER — OMEPRAZOLE 40 MG/1
40 CAPSULE, DELAYED RELEASE ORAL DAILY
Qty: 60 CAPSULE | Refills: 1 | Status: CANCELLED | OUTPATIENT
Start: 2022-10-13 | End: 2022-12-12

## 2022-10-13 RX ORDER — FAMOTIDINE 40 MG/1
40 TABLET, FILM COATED ORAL DAILY
Qty: 60 TABLET | Refills: 1 | Status: SHIPPED | OUTPATIENT
Start: 2022-10-13 | End: 2022-12-08

## 2022-10-13 NOTE — PROGRESS NOTES
Preceptor Attestation:    I discussed the patient with the resident and evaluated the patient in person. I have verified the content of the note, which accurately reflects my assessment of the patient and the plan of care.   Supervising Physician:  Shila Daugherty MD.

## 2022-10-13 NOTE — PATIENT INSTRUCTIONS
Patient Education   Here is the plan from today's visit    ASK THE PHARMACIST FOR B6/UNISOM  TAKE THAT EVERY NIGHT      1. Encounter for supervision of high risk pregnancy in third trimester, antepartum    - TDAP VACCINE (Adacel, Boostrix)  [[id number]]  - COVID-19,PF,PFIZER BOOSTER BIVALENT 12+Yrs  - famotidine (PEPCID) 40 MG tablet; Take 1 tablet (40 mg) by mouth daily  Dispense: 60 tablet; Refill: 1    2. Anemia affecting pregnancy in third trimester      3. Iron deficiency      4. Major depression in remission (H)      5. Bilateral low back pain without sciatica, unspecified chronicity      6. Nausea and vomiting in pregnancy            Please call or return to clinic if your symptoms don't go away.    Follow up plan  Return in 2 weeks (on 10/27/2022) for prenatal care.    Thank you for coming to [facility] today.  Lab Testing:  **If you had lab testing today and your results are reassuring or normal they will be mailed to you or sent through [employer] within 7 days.   **If the lab tests need quick action we will call you with the results.  **If you are having labs done on a different day, please call [phone] to schedule at [facility] or [phone] for other [facility] Lab locations. Labs do not offer walk-in appointments.  The phone number we will call with results is # [phone] (home) . If this is not the best number please call our clinic and change the number.  Medication Refills:  If you need any refills please call your pharmacy and they will contact us.   If you need to  your refill at a new pharmacy, please contact the new pharmacy directly. The new pharmacy will help you get your medications transferred faster.   Scheduling:  If you have any concerns about today's visit or wish to schedule another appointment please call our office during normal business hours [phone] (8-5:00 M-F)  If a referral was made to an [facility] specialty provider and you do not  get a call from central scheduling, please refer to directions on your visit summary or call our office during normal business hours for assistance.   If a Mammogram was ordered for you at [facility] call [phone] to schedule or change your appointment.  If you had an XRay/CT/Ultrasound/MRI ordered the number is [phone] to schedule or change your radiology appointment.   [facility] has limited ultrasound appointments available on Wednesdays, if you would like your ultrasound at [facility], please call [phone] to schedule.   Medical Concerns:  If you have urgent medical concerns please call [phone] at any time of the day.    Collin Alejandro, DO

## 2022-11-03 ENCOUNTER — OFFICE VISIT (OUTPATIENT)
Dept: FAMILY MEDICINE | Facility: CLINIC | Age: 27
End: 2022-11-03
Payer: COMMERCIAL

## 2022-11-03 VITALS
SYSTOLIC BLOOD PRESSURE: 108 MMHG | TEMPERATURE: 98.4 F | DIASTOLIC BLOOD PRESSURE: 69 MMHG | RESPIRATION RATE: 16 BRPM | BODY MASS INDEX: 25.74 KG/M2 | WEIGHT: 176.8 LBS | OXYGEN SATURATION: 97 % | HEART RATE: 93 BPM

## 2022-11-03 DIAGNOSIS — R12 HEARTBURN DURING PREGNANCY, ANTEPARTUM: ICD-10-CM

## 2022-11-03 DIAGNOSIS — F31.70 BIPOLAR DISORDER IN FULL REMISSION, MOST RECENT EPISODE UNSPECIFIED TYPE (H): ICD-10-CM

## 2022-11-03 DIAGNOSIS — O99.013 MATERNAL IRON DEFICIENCY ANEMIA COMPLICATING PREGNANCY IN THIRD TRIMESTER: ICD-10-CM

## 2022-11-03 DIAGNOSIS — O26.899 HEARTBURN DURING PREGNANCY, ANTEPARTUM: ICD-10-CM

## 2022-11-03 DIAGNOSIS — D50.9 MATERNAL IRON DEFICIENCY ANEMIA COMPLICATING PREGNANCY IN THIRD TRIMESTER: ICD-10-CM

## 2022-11-03 DIAGNOSIS — O09.93 ENCOUNTER FOR SUPERVISION OF HIGH RISK PREGNANCY IN THIRD TRIMESTER, ANTEPARTUM: Primary | ICD-10-CM

## 2022-11-03 LAB
FERRITIN SERPL-MCNC: 20 NG/ML (ref 6–175)
HGB BLD-MCNC: 11 G/DL (ref 11.7–15.7)

## 2022-11-03 PROCEDURE — 36415 COLL VENOUS BLD VENIPUNCTURE: CPT | Performed by: STUDENT IN AN ORGANIZED HEALTH CARE EDUCATION/TRAINING PROGRAM

## 2022-11-03 PROCEDURE — 85018 HEMOGLOBIN: CPT | Performed by: STUDENT IN AN ORGANIZED HEALTH CARE EDUCATION/TRAINING PROGRAM

## 2022-11-03 PROCEDURE — 99207 PR PRENATAL VISIT: CPT | Mod: GC | Performed by: STUDENT IN AN ORGANIZED HEALTH CARE EDUCATION/TRAINING PROGRAM

## 2022-11-03 PROCEDURE — 82728 ASSAY OF FERRITIN: CPT | Performed by: STUDENT IN AN ORGANIZED HEALTH CARE EDUCATION/TRAINING PROGRAM

## 2022-11-03 NOTE — PATIENT INSTRUCTIONS
Thank you for coming to [facility]!  - If you had lab testing today and your results are normal they will be be mailed to you or sent to your MyChart within seven days.   - If the lab tests need quick action we will call you with the results.  - The phone number we will call with results is # [phone] (home) . If this is not the best number please call our clinic and change the number.  - If any referrals were made to [facility] and you don't get a call, call central scheduling [phone]  - If you need any refills please call your pharmacy and they will contact us.  - If you had an XRay/CT/Ultrasound/MRI ordered the number is [phone] to schedule or change your appointment  - If you have any concerns about today's visit or wish to schedule another appointment please call our office [phone] (8-5:00 M-F)  - If you have urgent medical concerns call [phone] at any time of the day.  - If you have a medical emergency please call [phone].    Because you are pregnant, we have additional resources for you:  - You may call and ask to speak with one of our clinic nurses at [phone] during normal business hours, for non-urgent questions about your pregnancy.  - Immediate OB help is also available 24 hours a day, seven days a week via the [facility] - [phone]  located at [address] [place] [address]    Prenatal Timeline:  Before 14 weeks: dating ultrasound       This ultrasound helps us determine your dates accurately.  15 - 18 weeks: Optional genetic testing (quad screen)       This testing helps understand your baby's risk for some genetic abnormalities.  22 - 24 weeks: Ultrasound (fetal anatomy survey)       This testing will look for early growth abnormalities, and might tell the baby's sex.  24 - 28 weeks: One hour sugar test (GCT)        This test helps identify diabetes of pregnancy.  27 - 36 weeks: Tetanus  shot (Tdap)       This shot helps protect you and your baby from tetanus and whooping cough.  36 weeks and later: Group B Strep test (GBS)       This test helps predict if you need antibiotics in labor to prevent infection in your baby.  Anytime September to April: flu shot       This shot helps protect you and your family from the flu.  This is especially important during pregnancy!  Once every trimester: blood iron test (hemoglobin)       This test helps us know if you will need extra iron to support your baby.  At any time during or after your pregnancy: Some women experience baby blues.  We can help you with: Feeling anxious, Overwhelmed or sad, Trouble sleeping, Crying uncontrollably, Trouble caring for yourself or baby.    How frequently should you see your provider?  < 28 weeks: every 4 weeks  28-36 weeks: every 2 weeks  >36 weeks: every week    Call your healthcare provider immediately if you experience any of the following symptoms:  Bleeding from nipples, rectum, bladder, or coughing up blood  Vaginal bleeding, no matter how slight (unless small amount after a pelvic exam)  Swelling of hands or face  Dimness or blurring of vision  Severe or continuous headaches  Abdominal pains or contractions that do not go away with heat and rest or a bowel movement  Chills or fever over 100.4  Persistent vomiting  Painful or burning urination  Decrease in fetal movement  Sudden or slow escape of fluid from the vagina

## 2022-11-03 NOTE — PROGRESS NOTES
"Return OB visit  29-34 weeks    Subjective:   Blaire is a 27 year old  female at 33w1d who returns for prenatal care. LIEN Dec 21, 2022.  - Concerns today    None today     - Patient negatives    No vaginal bleeding    No LOF    No nausea / vomit    No dysuria    No headache / acute vision blurring / floaters    No dysnea / chest pain    No RUQ abdominal pains    No acute lower extremity swelling    - Patient positives    Yes to pressure, cora hick\" like feelings - tightening and uncomfortable but not real contractions    Yes to fetal movement    Yes to heartburn, but it responds well to Pepcid    - Mood has been \"fine\".    - Feels like lately she needs her glasses more often (ie sometimes she could get away without her glasses's when playing switch or on phone) - but once they are on no concerns     Other medical concerns  Bipolar Disorder - on Seroquel, now off since 2022  MDD - on Celexa    Has been on a Seroquel taper in conjunction w/ primary and psychiatry    Finished taper on 2022     Currently seeing psychiatry and therapy thru \"[facility]\" - plan is to manage current conditions through therapy peripartum and postpartum     Still currently taking Celexa 20mg daily      Iron Deficiency Anemia    Hgb  10.5 -> iron studies show iron deficiency    Started an oral iron on     Tolerating it well, taking it nightly (also when she takes pepcid)     GERD    Worsening at our last visit    Noteda 2lb weight loss - patient admits to difficulty eating with GERD    Had her start Pepcid 40mg as well as B6/Unisom    Pepcid is helping, though is using it PRN at night    Has not gotten the B6/Unisom    Also eating smaller meals (more frequent) which has also helped with the reflux     Wt Readings from Last 4 Encounters:   22 80.2 kg (176 lb 12.8 oz)   10/13/22 81.5 kg (179 lb 9.6 oz)   22 82.1 kg (181 lb)   22 78.8 kg (173 lb 12.8 oz)       Nutrition    Egg white keish, OJ in the " AM    Lunch - variable, ie noodle bowl yesterday    Dinner - something light-mediumish - ie chili    Snacks (granola bars, yogurts)    Usually satisfied - not hungry after eating     Stooling    Noting some more softer stools / diarrhea for the past week or so     No recent infections, but had a cold 3 weeks ago    Does not feel constipated, no abdominal pain     Does not have to strain a lot    Work    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   See flowsheet for FH, FHTs, fetal presentation, edema.    Prenatal labs:   Hemoglobin   Date Value Ref Range Status   11/03/2022 11.0 (L) 11.7 - 15.7 g/dL Final   08/26/2020 13.9 11.7 - 15.7 g/dL Final       Assessment & plan:   IUP at 33w1d weeks by LMP consistent with first trimester ultrasound.     Pregnancy complicated by:   - Psychiatric Medication Use (selective serotonin reuptake inhibitor and neuroleptic)  - Bipolar Disorder  - MDD and MILES   - Iron Deficiency Anemia    - Breech Presentation at 33w    Prenatal labs were reviewed  - O Positive (collected 5/2022), RH+ status  - Second trimester hgb 10.5 9/9/2022 w/ findings of iron deficiency - started oral iron   - Passed 1hr GCT  - Anti-Treponema was negative    Counseling  - Contraception plan after pregnancy - IUD   - MFM signed off, re-assuring findings following Seroquel taper - no need for further US    Bipolar Disorder  MDD  Seroquel Taper, now off since 9/11  Doing well.   - Continue to use Celexa.     Low Back Pain  SI Joint Pain  -Continues w/ gentle chiropractic as  needed.    Iron Deficiency Anemia   -Check hgb again today, if still below 11 we will order IV iron therapy.     GERD  3lb weight loss since last visit  Pepcid is helpful, but taking it PRN. Also eating smaller meals, but does not feel hungry still after eating.   - Advised to start taking Pepcid daily, to allow it to give her a baseline control of reflux - and see if this can allow her to eat a bit more during breakfast or lunch (second portions).   - We will follow weight closely.     Breech Presentation at 33w  Cephalic up to this point.   - Continue to closely monitor presentation at each visit  - Obtain 36-37w US and if still breech -> ECV    - Patient is measuring appropriately for gestational age. Pregnancy weight gain has been 6.26 kg (13 lb 12.8 oz) to date, but has lost weight over the last two visits.     - Provided counseling regarding:  labor signs, hospital readiness, sibling preparation, parenting resources (WIC, etc).     - Patient will continue taking prenatal vitamins and avoiding cigarettes & alcohol.     Return to clinic in 2 weeks.     Options for treatment and follow-up care were reviewed with the patient and/or guardian. Blaire Owusu and/or guardian engaged in the decision making process and verbalized understanding of the options discussed and agreed with the final plan.    Collin Alejandro, DO

## 2022-11-03 NOTE — Clinical Note
So hgb returned 11, which is JUST the threshold for IV iron. However, Ferratin is well less than 100, meeting criteria. My thought is we should do IV iron considering she is just on the border (and the weight loss / less PO) to be on the safe side.  I was trying to do the therapy admit - but I don't have the authority to sign for the meperidine (the PRN medication for infusion reactions). Would you be able to sign for the orders? I don't want to remove things from the standard order set. Ty!  - Festus

## 2022-11-03 NOTE — PROGRESS NOTES
Preceptor Attestation:  Patient seen and evaluated in person. I discussed the patient with the resident. I have verified the content of the note, which accurately reflects my assessment of the patient and the plan of care.   Supervising Physician:  Emily Cabrales DO

## 2022-11-05 RX ORDER — EPINEPHRINE 1 MG/ML
0.3 INJECTION, SOLUTION, CONCENTRATE INTRAVENOUS EVERY 5 MIN PRN
Status: CANCELLED | OUTPATIENT
Start: 2022-11-05

## 2022-11-05 RX ORDER — ALBUTEROL SULFATE 90 UG/1
1-2 AEROSOL, METERED RESPIRATORY (INHALATION)
Status: CANCELLED
Start: 2022-11-05

## 2022-11-05 RX ORDER — DIPHENHYDRAMINE HYDROCHLORIDE 50 MG/ML
50 INJECTION INTRAMUSCULAR; INTRAVENOUS
Status: CANCELLED
Start: 2022-11-05

## 2022-11-05 RX ORDER — ALBUTEROL SULFATE 0.83 MG/ML
2.5 SOLUTION RESPIRATORY (INHALATION)
Status: CANCELLED | OUTPATIENT
Start: 2022-11-05

## 2022-11-05 RX ORDER — METHYLPREDNISOLONE SODIUM SUCCINATE 125 MG/2ML
125 INJECTION, POWDER, LYOPHILIZED, FOR SOLUTION INTRAMUSCULAR; INTRAVENOUS
Status: CANCELLED
Start: 2022-11-05

## 2022-11-05 RX ORDER — MEPERIDINE HYDROCHLORIDE 25 MG/ML
25 INJECTION INTRAMUSCULAR; INTRAVENOUS; SUBCUTANEOUS EVERY 30 MIN PRN
Status: CANCELLED | OUTPATIENT
Start: 2022-11-05

## 2022-11-05 RX ORDER — HEPARIN SODIUM,PORCINE 10 UNIT/ML
5 VIAL (ML) INTRAVENOUS
Status: CANCELLED | OUTPATIENT
Start: 2022-11-05

## 2022-11-05 RX ORDER — HEPARIN SODIUM (PORCINE) LOCK FLUSH IV SOLN 100 UNIT/ML 100 UNIT/ML
5 SOLUTION INTRAVENOUS
Status: CANCELLED | OUTPATIENT
Start: 2022-11-05

## 2022-11-15 ENCOUNTER — INFUSION THERAPY VISIT (OUTPATIENT)
Dept: INFUSION THERAPY | Facility: CLINIC | Age: 27
End: 2022-11-15
Attending: STUDENT IN AN ORGANIZED HEALTH CARE EDUCATION/TRAINING PROGRAM
Payer: COMMERCIAL

## 2022-11-15 VITALS
DIASTOLIC BLOOD PRESSURE: 66 MMHG | OXYGEN SATURATION: 97 % | TEMPERATURE: 97.7 F | HEART RATE: 95 BPM | RESPIRATION RATE: 14 BRPM | SYSTOLIC BLOOD PRESSURE: 107 MMHG

## 2022-11-15 DIAGNOSIS — O99.013 MATERNAL IRON DEFICIENCY ANEMIA COMPLICATING PREGNANCY IN THIRD TRIMESTER: Primary | ICD-10-CM

## 2022-11-15 DIAGNOSIS — D50.9 MATERNAL IRON DEFICIENCY ANEMIA COMPLICATING PREGNANCY IN THIRD TRIMESTER: Primary | ICD-10-CM

## 2022-11-15 PROCEDURE — 258N000003 HC RX IP 258 OP 636: Performed by: STUDENT IN AN ORGANIZED HEALTH CARE EDUCATION/TRAINING PROGRAM

## 2022-11-15 PROCEDURE — 96366 THER/PROPH/DIAG IV INF ADDON: CPT

## 2022-11-15 PROCEDURE — 250N000011 HC RX IP 250 OP 636: Performed by: STUDENT IN AN ORGANIZED HEALTH CARE EDUCATION/TRAINING PROGRAM

## 2022-11-15 PROCEDURE — 96365 THER/PROPH/DIAG IV INF INIT: CPT

## 2022-11-15 RX ORDER — ALBUTEROL SULFATE 0.83 MG/ML
2.5 SOLUTION RESPIRATORY (INHALATION)
Status: CANCELLED | OUTPATIENT
Start: 2022-11-17

## 2022-11-15 RX ORDER — MEPERIDINE HYDROCHLORIDE 25 MG/ML
25 INJECTION INTRAMUSCULAR; INTRAVENOUS; SUBCUTANEOUS EVERY 30 MIN PRN
Status: CANCELLED | OUTPATIENT
Start: 2022-11-17

## 2022-11-15 RX ORDER — EPINEPHRINE 1 MG/ML
0.3 INJECTION, SOLUTION INTRAMUSCULAR; SUBCUTANEOUS EVERY 5 MIN PRN
Status: CANCELLED | OUTPATIENT
Start: 2022-11-17

## 2022-11-15 RX ORDER — HEPARIN SODIUM (PORCINE) LOCK FLUSH IV SOLN 100 UNIT/ML 100 UNIT/ML
5 SOLUTION INTRAVENOUS
Status: CANCELLED | OUTPATIENT
Start: 2022-11-17

## 2022-11-15 RX ORDER — HEPARIN SODIUM,PORCINE 10 UNIT/ML
5 VIAL (ML) INTRAVENOUS
Status: CANCELLED | OUTPATIENT
Start: 2022-11-17

## 2022-11-15 RX ORDER — METHYLPREDNISOLONE SODIUM SUCCINATE 125 MG/2ML
125 INJECTION, POWDER, LYOPHILIZED, FOR SOLUTION INTRAMUSCULAR; INTRAVENOUS
Status: CANCELLED
Start: 2022-11-17

## 2022-11-15 RX ORDER — DIPHENHYDRAMINE HYDROCHLORIDE 50 MG/ML
50 INJECTION INTRAMUSCULAR; INTRAVENOUS
Status: CANCELLED
Start: 2022-11-17

## 2022-11-15 RX ORDER — ALBUTEROL SULFATE 90 UG/1
1-2 AEROSOL, METERED RESPIRATORY (INHALATION)
Status: CANCELLED
Start: 2022-11-17

## 2022-11-15 RX ADMIN — IRON SUCROSE 300 MG: 20 INJECTION, SOLUTION INTRAVENOUS at 09:46

## 2022-11-15 RX ADMIN — SODIUM CHLORIDE 250 ML: 9 INJECTION, SOLUTION INTRAVENOUS at 09:46

## 2022-11-15 ASSESSMENT — PAIN SCALES - GENERAL: PAINLEVEL: NO PAIN (0)

## 2022-11-15 NOTE — LETTER
Date:November 15, 2022      Provider requested that no letter be sent. Do not send.       [facility]

## 2022-11-15 NOTE — PATIENT INSTRUCTIONS
Dear Blaire Owusu    Thank you for choosing [facility] ([facility]) for your infusion.  The following information is a summary of our appointment as well as important reminders.      We look forward in seeing you on your next appointment here at [facility]  Please don t hesitate to call us at [phone] to reschedule any of your appointments or to speak with one of the [facility] registered nurses.  It was a pleasure taking care of you today.    Sincerely,    [facility]  [facility]  [address] [place] [address]  [id number]  Phone:  [phone]

## 2022-11-15 NOTE — LETTER
11/15/2022         RE: Blaire Owusu  [address]  [place] [address]        Dear Colleague,    Thank you for referring your patient, Blaire Owusu, to the [facility]. Please see a copy of my visit note below.    Infusion Nursing Note:  Blaire Owusu presents today for first dose venofer.    Patient seen by provider today: No   present during visit today: Not Applicable.    Note:   Venofer infused over 90 minutes.  250 ml NS infused concurrently over 90 minutes.     Intravenous Access:  Peripheral IV placed.    Treatment Conditions:  Not Applicable.    Administrations This Visit     0.9% sodium chloride BOLUS     Admin Date  11/15/2022 Action  New Bag Dose  250 mL Route  Intravenous Administered By  Mary Keller RN          iron sucrose (VENOFER) 300 mg in sodium chloride 0.9 % 290 mL intermittent infusion     Admin Date  11/15/2022 Action  New Bag Dose  300 mg Rate  193.3 mL/hr Route  Intravenous Administered By  Mary Keller, LEANNA              /57 (BP Location: Left arm, Patient Position: Sitting, Cuff Size: Adult Regular)   Pulse 95   Temp 97.7  F (36.5  C) (Oral)   LMP 03/16/2022 (Exact Date)   SpO2 97%     Post Infusion Assessment:  Patient tolerated infusion without incident.  Blood return noted pre and post infusion.  Site patent and intact, free from redness, edema or discomfort.  No evidence of extravasations.  Access discontinued per protocol.     Discharge Plan:   Discharge instructions reviewed with: Patient.  Patient and/or family verbalized understanding of discharge instructions and all questions answered.  AVS to patient via everyArtT.  Patient will return 11/21 for next appointment.   Patient discharged in stable condition accompanied by: self.  Departure Mode: Ambulatory.    Mary Keller, LEANNA                        Again, thank you for allowing me to participate in the care of your patient.         Sincerely,        No name on file

## 2022-11-15 NOTE — PROGRESS NOTES
Infusion Nursing Note:  Blaire Owusu presents today for first dose venofer.    Patient seen by provider today: No   present during visit today: Not Applicable.    Note:   Venofer infused over 90 minutes.  250 ml NS infused concurrently over 90 minutes.     Intravenous Access:  Peripheral IV placed.    Treatment Conditions:  Not Applicable.    Administrations This Visit     0.9% sodium chloride BOLUS     Admin Date  11/15/2022 Action  New Bag Dose  250 mL Route  Intravenous Administered By  Mary Keller RN          iron sucrose (VENOFER) 300 mg in sodium chloride 0.9 % 290 mL intermittent infusion     Admin Date  11/15/2022 Action  New Bag Dose  300 mg Rate  193.3 mL/hr Route  Intravenous Administered By  Mary Keller RN              /57 (BP Location: Left arm, Patient Position: Sitting, Cuff Size: Adult Regular)   Pulse 95   Temp 97.7  F (36.5  C) (Oral)   LMP 03/16/2022 (Exact Date)   SpO2 97%     Post Infusion Assessment:  Patient tolerated infusion without incident.  Blood return noted pre and post infusion.  Site patent and intact, free from redness, edema or discomfort.  No evidence of extravasations.  Access discontinued per protocol.     Discharge Plan:   Discharge instructions reviewed with: Patient.  Patient and/or family verbalized understanding of discharge instructions and all questions answered.  AVS to patient via Marco VascoT.  Patient will return 11/21 for next appointment.   Patient discharged in stable condition accompanied by: self.  Departure Mode: Ambulatory.    Mary Keller RN

## 2022-11-18 ENCOUNTER — OFFICE VISIT (OUTPATIENT)
Dept: FAMILY MEDICINE | Facility: CLINIC | Age: 27
End: 2022-11-18
Payer: COMMERCIAL

## 2022-11-18 VITALS
RESPIRATION RATE: 16 BRPM | WEIGHT: 177.6 LBS | SYSTOLIC BLOOD PRESSURE: 118 MMHG | TEMPERATURE: 98.6 F | OXYGEN SATURATION: 95 % | DIASTOLIC BLOOD PRESSURE: 71 MMHG | BODY MASS INDEX: 25.43 KG/M2 | HEART RATE: 90 BPM | HEIGHT: 70 IN

## 2022-11-18 DIAGNOSIS — F31.70 BIPOLAR DISORDER IN FULL REMISSION, MOST RECENT EPISODE UNSPECIFIED TYPE (H): ICD-10-CM

## 2022-11-18 DIAGNOSIS — O09.93 ENCOUNTER FOR SUPERVISION OF HIGH RISK PREGNANCY IN THIRD TRIMESTER, ANTEPARTUM: Primary | ICD-10-CM

## 2022-11-18 DIAGNOSIS — M54.50 BILATERAL LOW BACK PAIN WITHOUT SCIATICA, UNSPECIFIED CHRONICITY: ICD-10-CM

## 2022-11-18 DIAGNOSIS — O21.9 NAUSEA AND VOMITING IN PREGNANCY: ICD-10-CM

## 2022-11-18 DIAGNOSIS — O99.013 MATERNAL IRON DEFICIENCY ANEMIA COMPLICATING PREGNANCY IN THIRD TRIMESTER: ICD-10-CM

## 2022-11-18 DIAGNOSIS — O26.899 HEARTBURN DURING PREGNANCY, ANTEPARTUM: ICD-10-CM

## 2022-11-18 DIAGNOSIS — D50.9 MATERNAL IRON DEFICIENCY ANEMIA COMPLICATING PREGNANCY IN THIRD TRIMESTER: ICD-10-CM

## 2022-11-18 DIAGNOSIS — R12 HEARTBURN DURING PREGNANCY, ANTEPARTUM: ICD-10-CM

## 2022-11-18 PROCEDURE — 99207 PR PRENATAL VISIT: CPT | Mod: GC | Performed by: STUDENT IN AN ORGANIZED HEALTH CARE EDUCATION/TRAINING PROGRAM

## 2022-11-18 NOTE — PATIENT INSTRUCTIONS
Thank you for coming to [facility]!  - If you had lab testing today and your results are normal they will be be mailed to you or sent to your MyChart within seven days.   - If the lab tests need quick action we will call you with the results.  - The phone number we will call with results is # [phone] (home) . If this is not the best number please call our clinic and change the number.  - If any referrals were made to [facility] and you don't get a call, call central scheduling [phone]  - If you need any refills please call your pharmacy and they will contact us.  - If you had an XRay/CT/Ultrasound/MRI ordered the number is [phone] to schedule or change your appointment  - If you have any concerns about today's visit or wish to schedule another appointment please call our office [phone] (8-5:00 M-F)  - If you have urgent medical concerns call [phone] at any time of the day.  - If you have a medical emergency please call 111.    Because you are pregnant, we have additional resources for you:  - You may call and ask to speak with one of our clinic nurses at [phone] during normal business hours, for non-urgent questions about your pregnancy.  - Immediate OB help is also available 24 hours a day, seven days a week via the [facility] ([facility] - [phone]  located at [address] [place] [address]    Prenatal Timeline:  Before 14 weeks: dating ultrasound       This ultrasound helps us determine your dates accurately.  15 - 18 weeks: Optional genetic testing (quad screen)       This testing helps understand your baby's risk for some genetic abnormalities.  22 - 24 weeks: Ultrasound (fetal anatomy survey)       This testing will look for early growth abnormalities, and might tell the baby's sex.  24 - 28 weeks: One hour sugar test (GCT)        This test helps identify diabetes of pregnancy.  27 - 36 weeks: Tetanus  shot (Tdap)       This shot helps protect you and your baby from tetanus and whooping cough.  36 weeks and later: Group B Strep test (GBS)       This test helps predict if you need antibiotics in labor to prevent infection in your baby.  Anytime September to April: flu shot       This shot helps protect you and your family from the flu.  This is especially important during pregnancy!  Once every trimester: blood iron test (hemoglobin)       This test helps us know if you will need extra iron to support your baby.  At any time during or after your pregnancy: Some women experience baby blues.  We can help you with: Feeling anxious, Overwhelmed or sad, Trouble sleeping, Crying uncontrollably, Trouble caring for yourself or baby.    How frequently should you see your provider?  < 28 weeks: every 4 weeks  28-36 weeks: every 2 weeks  >36 weeks: every week    Call your healthcare provider immediately if you experience any of the following symptoms:  Bleeding from nipples, rectum, bladder, or coughing up blood  Vaginal bleeding, no matter how slight (unless small amount after a pelvic exam)  Swelling of hands or face  Dimness or blurring of vision  Severe or continuous headaches  Abdominal pains or contractions that do not go away with heat and rest or a bowel movement  Chills or fever over 100.4  Persistent vomiting  Painful or burning urination  Decrease in fetal movement  Sudden or slow escape of fluid from the vagina

## 2022-11-18 NOTE — PROGRESS NOTES
"Return OB visit  35-39 weeks    Subjective:   Blaire is a 27 year old  female at 35w2d who returns for prenatal care. LIEN Dec 21, 2022.    - Concerns today    None today      - Patient negatives    No vaginal bleeding    No LOF    No nausea / vomit    No dysuria    No headache / acute vision blurring / floaters    No dysnea / chest pain    No RUQ abdominal pains    No acute lower extremity swelling    - Patient positives    Yes to \"contractions\" - tightness but nothing regular or concerning    Yes to fetal movement    Yes to some vaginal discharge - intermittent    - Mood has been \"good\"      Other medical concerns  Bipolar Disorder - on Seroquel, now off since 2022  MDD - on Celexa    Has been on a Seroquel taper in conjunction w/ primary and psychiatry    Finished taper on 2022     Currently seeing psychiatry and therapy thru [facility] - plan is to manage current conditions through therapy peripartum and postpartum     Currently taking Celexa 20mg daily      Iron Deficiency Anemia    Hgb  10.5 -> iron studies show iron deficiency    Started an oral iron on     Repeat Hgb on 11/3 just mildly improved to 11 -> decided to pursue IV therapy    So far one session 11/15 - tolerated it well     GERD    Better since our last visit with the Pepcid    Topping up her meals    Adding more carbs and yogurts to get in easy calories    Wt Readings from Last 4 Encounters:   22 80.6 kg (177 lb 9.6 oz)   22 80.2 kg (176 lb 12.8 oz)   10/13/22 81.5 kg (179 lb 9.6 oz)   22 82.1 kg (181 lb)       Stooling    Still softer - does not feel constipated    No blood or overt watery diarrhea     No cramping or strain     Work    [employer] - is an  - does make up, waxing, eye brows    Sat-Wed, 40 hr weeks, they give her enough breaks and time to rest if needed     Objective:   LMP 2022 (Exact Date)    Const: No distress  Abd: Gravid.   See flowsheet for FH, FHTs, fetal presentation, EFW, " edema.     Prenatal labs:   Hemoglobin   Date Value Ref Range Status   2022 11.0 (L) 11.7 - 15.7 g/dL Final   2020 13.9 11.7 - 15.7 g/dL Final       Assessment & plan:   IUP at 33w1d weeks by LMP consistent with first trimester ultrasound.    Pregnancy complicated by:   - Psychiatric Medication Use (selective serotonin reuptake inhibitor and neuroleptic)  - Bipolar Disorder  - MDD and MILES   - Iron Deficiency Anemia    - Breech Presentation at 33w     Prenatal labs were reviewed  - O Positive (collected 2022), RH+ status  - Second trimester hgb 10.5 2022 w/ findings of iron deficiency - started oral iron   - Passed 1hr GCT  - Anti-Treponema was negative     Counseling  - Contraception plan after pregnancy - IUD   - MFM signed off, re-assuring findings following Seroquel taper - no need for further US     Bipolar Disorder  MDD  Seroquel Taper, now off since   - Continue to use Celexa.     Low Back Pain  SI Joint Pain  -Continues w/ gentle chiropractic as needed.    Iron Deficiency Anemia   - IV therapy session 2 this upcoming Monday     GERD  1lb weight gain since last visit   Pepcid helpful now that she is taking it regularly. Weight gain since last visit, and she is topping up her meals. Total weight gain since pregravid is 14lb.   - Continue pepcid as a regular AM dose   - Continue to monitor her weight closely.      Breech Presentation at 33w  Cephalic Presentation at 35w  - Continue to closely monitor presentation at each visit  - Obtain 36-37w US and if still breech -> ECV   - Patient is measuring appropriately for gestational age. Pregnancy weight gain has been 6.26 kg (13 lb 12.8 oz) to date, but has lost weight over the last two visits.     High lying placenta  Visualized on ultrasound today     - Provided counseling regarding:  labor signs, hospital readiness, sibling preparation, parenting resources (WIC, etc).      - Patient will continue taking prenatal vitamins and avoiding  cigarettes & alcohol.     At next visit:  - Obtain GBS and pap     Return to clinic in 2 weeks.      Options for treatment and follow-up care were reviewed with the patient and/or guardian. Blaire Owusu and/or guardian engaged in the decision making process and verbalized understanding of the options discussed and agreed with the final plan.     Collin Alejandro, DO

## 2022-11-21 ENCOUNTER — INFUSION THERAPY VISIT (OUTPATIENT)
Dept: INFUSION THERAPY | Facility: CLINIC | Age: 27
End: 2022-11-21
Attending: STUDENT IN AN ORGANIZED HEALTH CARE EDUCATION/TRAINING PROGRAM
Payer: COMMERCIAL

## 2022-11-21 VITALS
TEMPERATURE: 97.8 F | DIASTOLIC BLOOD PRESSURE: 70 MMHG | OXYGEN SATURATION: 97 % | RESPIRATION RATE: 16 BRPM | SYSTOLIC BLOOD PRESSURE: 116 MMHG | HEART RATE: 92 BPM

## 2022-11-21 DIAGNOSIS — O99.013 MATERNAL IRON DEFICIENCY ANEMIA COMPLICATING PREGNANCY IN THIRD TRIMESTER: Primary | ICD-10-CM

## 2022-11-21 DIAGNOSIS — D50.9 MATERNAL IRON DEFICIENCY ANEMIA COMPLICATING PREGNANCY IN THIRD TRIMESTER: Primary | ICD-10-CM

## 2022-11-21 PROCEDURE — 258N000003 HC RX IP 258 OP 636: Performed by: STUDENT IN AN ORGANIZED HEALTH CARE EDUCATION/TRAINING PROGRAM

## 2022-11-21 PROCEDURE — 250N000011 HC RX IP 250 OP 636: Performed by: STUDENT IN AN ORGANIZED HEALTH CARE EDUCATION/TRAINING PROGRAM

## 2022-11-21 PROCEDURE — 96365 THER/PROPH/DIAG IV INF INIT: CPT

## 2022-11-21 RX ORDER — DIPHENHYDRAMINE HYDROCHLORIDE 50 MG/ML
50 INJECTION INTRAMUSCULAR; INTRAVENOUS
Status: CANCELLED
Start: 2022-11-23

## 2022-11-21 RX ORDER — MEPERIDINE HYDROCHLORIDE 25 MG/ML
25 INJECTION INTRAMUSCULAR; INTRAVENOUS; SUBCUTANEOUS EVERY 30 MIN PRN
Status: CANCELLED | OUTPATIENT
Start: 2022-11-23

## 2022-11-21 RX ORDER — HEPARIN SODIUM (PORCINE) LOCK FLUSH IV SOLN 100 UNIT/ML 100 UNIT/ML
5 SOLUTION INTRAVENOUS
Status: CANCELLED | OUTPATIENT
Start: 2022-11-23

## 2022-11-21 RX ORDER — EPINEPHRINE 1 MG/ML
0.3 INJECTION, SOLUTION INTRAMUSCULAR; SUBCUTANEOUS EVERY 5 MIN PRN
Status: CANCELLED | OUTPATIENT
Start: 2022-11-23

## 2022-11-21 RX ORDER — METHYLPREDNISOLONE SODIUM SUCCINATE 125 MG/2ML
125 INJECTION, POWDER, LYOPHILIZED, FOR SOLUTION INTRAMUSCULAR; INTRAVENOUS
Status: CANCELLED
Start: 2022-11-23

## 2022-11-21 RX ORDER — HEPARIN SODIUM,PORCINE 10 UNIT/ML
5 VIAL (ML) INTRAVENOUS
Status: CANCELLED | OUTPATIENT
Start: 2022-11-23

## 2022-11-21 RX ORDER — ALBUTEROL SULFATE 90 UG/1
1-2 AEROSOL, METERED RESPIRATORY (INHALATION)
Status: CANCELLED
Start: 2022-11-23

## 2022-11-21 RX ORDER — ALBUTEROL SULFATE 0.83 MG/ML
2.5 SOLUTION RESPIRATORY (INHALATION)
Status: CANCELLED | OUTPATIENT
Start: 2022-11-23

## 2022-11-21 RX ADMIN — IRON SUCROSE 300 MG: 20 INJECTION, SOLUTION INTRAVENOUS at 11:17

## 2022-11-21 ASSESSMENT — PAIN SCALES - GENERAL: PAINLEVEL: NO PAIN (0)

## 2022-11-21 NOTE — PROGRESS NOTES
Infusion Nursing Note:  Blaire Saxenason presents today for Venofer.    Patient seen by provider today: No   present during visit today: Not Applicable.    Note: N/A.    Intravenous Access:  Peripheral IV placed.    Treatment Conditions:  Not Applicable.    Post Infusion Assessment:  Patient tolerated infusion without incident.  Site patent and intact, free from redness, edema or discomfort.  Access discontinued per protocol.     Discharge Plan:   Patient and/or family verbalized understanding of discharge instructions and all questions answered.  Copy of AVS reviewed with patient and/or family.  Patient will return next week for next appointment.  Patient discharged in stable condition accompanied by: .    Administrations This Visit     iron sucrose (VENOFER) 300 mg in sodium chloride 0.9 % 290 mL intermittent infusion     Admin Date  11/21/2022 Action  New Bag Dose  300 mg Rate  193.3 mL/hr Route  Intravenous Administered By  Talia Dumont, LEANNA Dumont RN

## 2022-11-21 NOTE — PATIENT INSTRUCTIONS
Dear Blaire Owusu    Thank you for choosing [facility] ([facility]) for your infusion.  The following information is a summary of our appointment as well as important reminders.      We look forward in seeing you on your next appointment here at [facility] ([facility]).  Please don t hesitate to call us at [phone] to reschedule any of your appointments or to speak with one of the [facility] registered nurses.  It was a pleasure taking care of you today.    Sincerely,    [facility]  [facility]  [address] [place] [address]  [address]  Phone:  [phone]

## 2022-11-21 NOTE — LETTER
11/21/2022         RE: Blaire Owusu  [address]  [place] [address]        Dear Colleague,    Thank you for referring your patient, Blaire Owusu, to the [facility]. Please see a copy of my visit note below.    Infusion Nursing Note:  Blaire Owusu presents today for Venofer.    Patient seen by provider today: No   present during visit today: Not Applicable.    Note: N/A.    Intravenous Access:  Peripheral IV placed.    Treatment Conditions:  Not Applicable.    Post Infusion Assessment:  Patient tolerated infusion without incident.  Site patent and intact, free from redness, edema or discomfort.  Access discontinued per protocol.     Discharge Plan:   Patient and/or family verbalized understanding of discharge instructions and all questions answered.  Copy of AVS reviewed with patient and/or family.  Patient will return next week for next appointment.  Patient discharged in stable condition accompanied by: .    Administrations This Visit     iron sucrose (VENOFER) 300 mg in sodium chloride 0.9 % 290 mL intermittent infusion     Admin Date  11/21/2022 Action  New Bag Dose  300 mg Rate  193.3 mL/hr Route  Intravenous Administered By  Talia Dumont, LEANNA Dumont, LEANNA                        Again, thank you for allowing me to participate in the care of your patient.        Sincerely,        No name on file

## 2022-11-23 ENCOUNTER — HOSPITAL ENCOUNTER (OUTPATIENT)
Facility: CLINIC | Age: 27
End: 2022-11-23
Attending: STUDENT IN AN ORGANIZED HEALTH CARE EDUCATION/TRAINING PROGRAM | Admitting: STUDENT IN AN ORGANIZED HEALTH CARE EDUCATION/TRAINING PROGRAM
Payer: COMMERCIAL

## 2022-11-28 ENCOUNTER — INFUSION THERAPY VISIT (OUTPATIENT)
Dept: INFUSION THERAPY | Facility: CLINIC | Age: 27
End: 2022-11-28
Attending: STUDENT IN AN ORGANIZED HEALTH CARE EDUCATION/TRAINING PROGRAM
Payer: COMMERCIAL

## 2022-11-28 VITALS
SYSTOLIC BLOOD PRESSURE: 119 MMHG | DIASTOLIC BLOOD PRESSURE: 80 MMHG | HEART RATE: 83 BPM | RESPIRATION RATE: 16 BRPM | TEMPERATURE: 98.2 F | OXYGEN SATURATION: 96 %

## 2022-11-28 DIAGNOSIS — D50.9 MATERNAL IRON DEFICIENCY ANEMIA COMPLICATING PREGNANCY IN THIRD TRIMESTER: Primary | ICD-10-CM

## 2022-11-28 DIAGNOSIS — O99.013 MATERNAL IRON DEFICIENCY ANEMIA COMPLICATING PREGNANCY IN THIRD TRIMESTER: Primary | ICD-10-CM

## 2022-11-28 PROCEDURE — 250N000011 HC RX IP 250 OP 636: Performed by: STUDENT IN AN ORGANIZED HEALTH CARE EDUCATION/TRAINING PROGRAM

## 2022-11-28 PROCEDURE — 96365 THER/PROPH/DIAG IV INF INIT: CPT

## 2022-11-28 PROCEDURE — 258N000003 HC RX IP 258 OP 636: Performed by: STUDENT IN AN ORGANIZED HEALTH CARE EDUCATION/TRAINING PROGRAM

## 2022-11-28 RX ORDER — METHYLPREDNISOLONE SODIUM SUCCINATE 125 MG/2ML
125 INJECTION, POWDER, LYOPHILIZED, FOR SOLUTION INTRAMUSCULAR; INTRAVENOUS
Status: CANCELLED
Start: 2022-11-29

## 2022-11-28 RX ORDER — DIPHENHYDRAMINE HYDROCHLORIDE 50 MG/ML
50 INJECTION INTRAMUSCULAR; INTRAVENOUS
Status: CANCELLED
Start: 2022-11-29

## 2022-11-28 RX ORDER — HEPARIN SODIUM (PORCINE) LOCK FLUSH IV SOLN 100 UNIT/ML 100 UNIT/ML
5 SOLUTION INTRAVENOUS
Status: CANCELLED | OUTPATIENT
Start: 2022-11-29

## 2022-11-28 RX ORDER — EPINEPHRINE 1 MG/ML
0.3 INJECTION, SOLUTION INTRAMUSCULAR; SUBCUTANEOUS EVERY 5 MIN PRN
Status: CANCELLED | OUTPATIENT
Start: 2022-11-29

## 2022-11-28 RX ORDER — ALBUTEROL SULFATE 0.83 MG/ML
2.5 SOLUTION RESPIRATORY (INHALATION)
Status: CANCELLED | OUTPATIENT
Start: 2022-11-29

## 2022-11-28 RX ORDER — HEPARIN SODIUM,PORCINE 10 UNIT/ML
5 VIAL (ML) INTRAVENOUS
Status: CANCELLED | OUTPATIENT
Start: 2022-11-29

## 2022-11-28 RX ORDER — MEPERIDINE HYDROCHLORIDE 25 MG/ML
25 INJECTION INTRAMUSCULAR; INTRAVENOUS; SUBCUTANEOUS EVERY 30 MIN PRN
Status: CANCELLED | OUTPATIENT
Start: 2022-11-29

## 2022-11-28 RX ORDER — ALBUTEROL SULFATE 90 UG/1
1-2 AEROSOL, METERED RESPIRATORY (INHALATION)
Status: CANCELLED
Start: 2022-11-29

## 2022-11-28 RX ADMIN — IRON SUCROSE 300 MG: 20 INJECTION, SOLUTION INTRAVENOUS at 14:20

## 2022-11-28 NOTE — PROGRESS NOTES
Infusion Nursing Note:  Blaire Owusu presents today for Venofer, dose 3/3.  Patient seen by provider today: No   present during visit today: Not Applicable.    Note: Venofer given over 90m inutes.    Intravenous Access:  Peripheral IV placed.    Treatment Conditions:  Not Applicable.    Post Infusion Assessment:  Patient tolerated infusion without incident.  Blood return noted pre and post infusion.  Site patent and intact, free from redness, edema or discomfort.  No evidence of extravasations.  Access discontinued per protocol.     Discharge Plan:   Discharge instructions reviewed with: Patient.  Patient and/or family verbalized understanding of discharge instructions and all questions answered.  AVS to patient via Ludi labsT.  Patient will return under MD discretion for next appointment.   Patient discharged in stable condition accompanied by: self.  Departure Mode: Ambulatory.    Administrations This Visit     iron sucrose (VENOFER) 300 mg in sodium chloride 0.9 % 290 mL intermittent infusion     Admin Date  11/28/2022 Action  New Bag Dose  300 mg Rate  193.3 mL/hr Route  Intravenous Administered By  Laurie Pena RN              /80 (BP Location: Left arm)   Pulse 83   Temp 98.2  F (36.8  C) (Oral)   Resp 16   LMP 03/16/2022 (Exact Date)   SpO2 96%       Report given to LEANNA Clemons at 1520. Care transferred at that time.    LAURIE PENA RN

## 2022-11-28 NOTE — PATIENT INSTRUCTIONS
Dear Blaire,    Thank you for choosing [facility] ([facility]) for your infusion.  The following information is a summary of our appointment as well as important reminders.      We look forward in seeing you on your next appointment here at [facility] ([facility]).  Please don t hesitate to call us at [phone] to reschedule any of your appointments or to speak with one of the [facility] registered nurses.  It was a pleasure taking care of you today.    Sincerely,    [facility]  [facility]  [address] [place] [address]  [address]  Phone:  [phone]

## 2022-11-28 NOTE — LETTER
11/28/2022         RE: Blaire Owusu  [address]  [place] [address]        Dear Colleague,    Thank you for referring your patient, Blaire Owusu, to the [facility]. Please see a copy of my visit note below.    Infusion Nursing Note:  Blaire Owusu presents today for Venofer, dose 3/3.  Patient seen by provider today: No   present during visit today: Not Applicable.    Note: Venofer given over 90m inutes.    Intravenous Access:  Peripheral IV placed.    Treatment Conditions:  Not Applicable.    Post Infusion Assessment:  Patient tolerated infusion without incident.  Blood return noted pre and post infusion.  Site patent and intact, free from redness, edema or discomfort.  No evidence of extravasations.  Access discontinued per protocol.     Discharge Plan:   Discharge instructions reviewed with: Patient.  Patient and/or family verbalized understanding of discharge instructions and all questions answered.  AVS to patient via BeLocalHART.  Patient will return under MD discretion for next appointment.   Patient discharged in stable condition accompanied by: self.  Departure Mode: Ambulatory.    Administrations This Visit     iron sucrose (VENOFER) 300 mg in sodium chloride 0.9 % 290 mL intermittent infusion     Admin Date  11/28/2022 Action  New Bag Dose  300 mg Rate  193.3 mL/hr Route  Intravenous Administered By  Laurie Pena RN              /80 (BP Location: Left arm)   Pulse 83   Temp 98.2  F (36.8  C) (Oral)   Resp 16   LMP 03/16/2022 (Exact Date)   SpO2 96%       Report given to LEANNA Clemons at 1520. Care transferred at that time.    LAURIE PENA RN              Again, thank you for allowing me to participate in the care of your patient.        Sincerely,        No name on file

## 2022-12-07 LAB
ABO/RH(D): NORMAL
ANTIBODY SCREEN: NEGATIVE
SPECIMEN EXPIRATION DATE: NORMAL

## 2022-12-08 ENCOUNTER — OFFICE VISIT (OUTPATIENT)
Dept: FAMILY MEDICINE | Facility: CLINIC | Age: 27
End: 2022-12-08
Payer: COMMERCIAL

## 2022-12-08 VITALS
WEIGHT: 182.6 LBS | SYSTOLIC BLOOD PRESSURE: 112 MMHG | BODY MASS INDEX: 26.58 KG/M2 | TEMPERATURE: 97.9 F | DIASTOLIC BLOOD PRESSURE: 75 MMHG | HEART RATE: 91 BPM | OXYGEN SATURATION: 95 %

## 2022-12-08 DIAGNOSIS — O26.899 HEARTBURN DURING PREGNANCY, ANTEPARTUM: ICD-10-CM

## 2022-12-08 DIAGNOSIS — R12 HEARTBURN DURING PREGNANCY, ANTEPARTUM: ICD-10-CM

## 2022-12-08 DIAGNOSIS — Z11.59 ENCOUNTER FOR HEPATITIS C SCREENING TEST FOR LOW RISK PATIENT: ICD-10-CM

## 2022-12-08 DIAGNOSIS — D50.9 MATERNAL IRON DEFICIENCY ANEMIA COMPLICATING PREGNANCY IN THIRD TRIMESTER: ICD-10-CM

## 2022-12-08 DIAGNOSIS — Z11.59 NEED FOR HEPATITIS B SCREENING TEST: ICD-10-CM

## 2022-12-08 DIAGNOSIS — O09.93 ENCOUNTER FOR SUPERVISION OF HIGH RISK PREGNANCY IN THIRD TRIMESTER, ANTEPARTUM: Primary | ICD-10-CM

## 2022-12-08 DIAGNOSIS — O99.013 MATERNAL IRON DEFICIENCY ANEMIA COMPLICATING PREGNANCY IN THIRD TRIMESTER: ICD-10-CM

## 2022-12-08 LAB — HGB BLD-MCNC: 12.3 G/DL (ref 11.7–15.7)

## 2022-12-08 PROCEDURE — 87624 HPV HI-RISK TYP POOLED RSLT: CPT | Performed by: STUDENT IN AN ORGANIZED HEALTH CARE EDUCATION/TRAINING PROGRAM

## 2022-12-08 PROCEDURE — 86704 HEP B CORE ANTIBODY TOTAL: CPT | Performed by: STUDENT IN AN ORGANIZED HEALTH CARE EDUCATION/TRAINING PROGRAM

## 2022-12-08 PROCEDURE — 86900 BLOOD TYPING SEROLOGIC ABO: CPT | Performed by: STUDENT IN AN ORGANIZED HEALTH CARE EDUCATION/TRAINING PROGRAM

## 2022-12-08 PROCEDURE — 86803 HEPATITIS C AB TEST: CPT | Performed by: STUDENT IN AN ORGANIZED HEALTH CARE EDUCATION/TRAINING PROGRAM

## 2022-12-08 PROCEDURE — G0145 SCR C/V CYTO,THINLAYER,RESCR: HCPCS | Performed by: STUDENT IN AN ORGANIZED HEALTH CARE EDUCATION/TRAINING PROGRAM

## 2022-12-08 PROCEDURE — 87340 HEPATITIS B SURFACE AG IA: CPT | Performed by: STUDENT IN AN ORGANIZED HEALTH CARE EDUCATION/TRAINING PROGRAM

## 2022-12-08 PROCEDURE — 86850 RBC ANTIBODY SCREEN: CPT | Performed by: STUDENT IN AN ORGANIZED HEALTH CARE EDUCATION/TRAINING PROGRAM

## 2022-12-08 PROCEDURE — 86901 BLOOD TYPING SEROLOGIC RH(D): CPT | Performed by: STUDENT IN AN ORGANIZED HEALTH CARE EDUCATION/TRAINING PROGRAM

## 2022-12-08 PROCEDURE — 87653 STREP B DNA AMP PROBE: CPT | Performed by: STUDENT IN AN ORGANIZED HEALTH CARE EDUCATION/TRAINING PROGRAM

## 2022-12-08 PROCEDURE — 85018 HEMOGLOBIN: CPT | Performed by: STUDENT IN AN ORGANIZED HEALTH CARE EDUCATION/TRAINING PROGRAM

## 2022-12-08 PROCEDURE — 36415 COLL VENOUS BLD VENIPUNCTURE: CPT | Performed by: STUDENT IN AN ORGANIZED HEALTH CARE EDUCATION/TRAINING PROGRAM

## 2022-12-08 PROCEDURE — 59426 ANTEPARTUM CARE ONLY: CPT | Mod: GC | Performed by: STUDENT IN AN ORGANIZED HEALTH CARE EDUCATION/TRAINING PROGRAM

## 2022-12-08 PROCEDURE — 86706 HEP B SURFACE ANTIBODY: CPT | Performed by: STUDENT IN AN ORGANIZED HEALTH CARE EDUCATION/TRAINING PROGRAM

## 2022-12-08 RX ORDER — FAMOTIDINE 40 MG/1
40 TABLET, FILM COATED ORAL 2 TIMES DAILY
Qty: 120 TABLET | Refills: 1 | Status: SHIPPED | OUTPATIENT
Start: 2022-12-08 | End: 2023-01-31

## 2022-12-08 RX ORDER — ONDANSETRON 4 MG/1
4 TABLET, ORALLY DISINTEGRATING ORAL EVERY 8 HOURS PRN
Qty: 30 TABLET | Refills: 1 | Status: SHIPPED | OUTPATIENT
Start: 2022-12-08 | End: 2023-01-07

## 2022-12-08 NOTE — PROGRESS NOTES
"Return OB visit  35-39 weeks    Subjective:   Blaire is a 27 year old  female at 38w1d who returns for prenatal care. LIEN Dec 21, 2022.    - Concerns today    None today      - Patient negatives    No vaginal bleeding    No LOF    No nausea    No dysuria    No headache / acute vision blurring / floaters    No chest pain    No RUQ abdominal pains    No acute lower extremity swelling     - Patient positives    Yes to \"contractions\" - nothing that would make her want to time them - usually at night    Yes to fetal movement    Yes to some vaginal discharge - intermittent - \"creamy white\" but noting alarming / nothing mucus / nothing bloody    Yes to heartburn - pepcid working in the day - but noting more heartburn at night (snacks at night too)    Yes to vomit - looked like bile and \"little strokes of light red\" - had a  cherry pie - vomiting once a night     Yes to dyspnea - sort of new - not to the point where she can't breath      - Mood has been \"good\"    FHR - 130-140  Fundal 37  Cephalic on US    Other medical concerns  Bipolar Disorder - on Seroquel, now off since 2022  MDD - on Celexa    Has been on a Seroquel taper in conjunction w/ primary and psychiatry    Finished taper on 2022     Currently seeing psychiatry and therapy thru [facility]\" - plan is to manage current conditions through therapy peripartum and postpartum     Currently taking Celexa 20mg daily      Iron Deficiency Anemia    Hgb  10.5 -> iron studies show iron deficiency    Started an oral iron on     Repeat Hgb on 11/3 just mildly improved to 11 -> decided to pursue IV therapy    Now completed 3 IV sessions     GERD    Since last saw, more reflux at night - had to take a lot of TUMS and didn't help much    Has noting nightly vomiting starting week and half a ago    Is keeping food down - noting vomiting 1-2hrs after eating after laying     1/4 mug size of vomit     Pepcid works in the day, but wearing off by night time "     Wt Readings from Last 4 Encounters:   12/08/22 82.8 kg (182 lb 9.6 oz)   11/18/22 80.6 kg (177 lb 9.6 oz)   11/03/22 80.2 kg (176 lb 12.8 oz)   10/13/22 81.5 kg (179 lb 9.6 oz)        Stooling    Still soft as before - does not feel constipated    No blood or overt watery diarrhea     No cramping or strain    Regular - same time every day - pain from 2 weeks ago resolved - though to be GI related - has not had since      Work    [employer] - is an  - does make up, waxing, eye brows    Last day worked was Sat - doing well - but now on leave for pregnancy - planning to go back Feb 15       Objective:   LMP 03/16/2022 (Exact Date)    Const: No distress  Abd: Gravid.   See flowsheet for FH, FHTs, fetal presentation, EFW, edema.     Prenatal labs:   Hemoglobin   Date Value Ref Range Status   11/03/2022 11.0 (L) 11.7 - 15.7 g/dL Final   08/26/2020 13.9 11.7 - 15.7 g/dL Final     Assessment & plan:   IUP at 38w1d weeks by LMP consistent with first trimester ultrasound.    TODAY  - GROUP B STREPT SCREEN   - PAP SCREEN   - GET HgB   - GET ABO repeat   - ASSESS HepB IMMUNE STATUS       Pregnancy complicated by:   - Psychiatric Medication Use (selective serotonin reuptake inhibitor and neuroleptic)  - Bipolar Disorder  - MDD and MILES   - Iron Deficiency Anemia    - Breech Presentation at 33w     Prenatal labs were reviewed  - O Positive (collected 5/2022), RH+ status  - Second trimester hgb 10.5 9/9/2022 = iron deficiency - started oral iron - transitioned to IV on repeat hgb, now s/p 3 IV tx  - Passed 1hr GCT  - Anti-Treponema was negative     Counseling  - Contraception plan after pregnancy - IUD   - MFM signed off, re-assuring findings following Seroquel taper - no need for further US    Iron Deficiency Anemia   - Now s/p 3 IV sessions  - Obtain hgb today    GBS screening  Pap smear  - Obtain both today     Bipolar Disorder  MDD  Seroquel Taper, now off since 9/11  - Continue to use Celexa.     Low Back Pain  SI  Joint Pain  -Continues w/ gentle chiropractic as needed.     GERD  5lb weight gain since last visit   Pepcid helpful now that she is taking it regularly. Weight gain since last visit.  - Increase PEPCID dosing to BID  - Continue to monitor her weight closely.      Breech Presentation at 33w  Cephalic Presentation at 35w  Cephalic Presentation at 38w  - Continue to closely monitor presentation at each visit  - Patient is measuring appropriately for gestational age.    High lying placenta  - Noted on our previous POCUS    Hep B Immune Status  - Obtain labs to assess her immune status  - We will order vaccines if need - safe to administer during pregnancy     - Provided counseling regarding:  labor signs, hospital readiness, sibling preparation, parenting resources (WIC, etc).      - Patient will continue taking prenatal vitamins and avoiding cigarettes & alcohol.    - Return to clinic in 1 week.    Options for treatment and follow-up care were reviewed with the patient and/or guardian. Blaire Owusu and/or guardian engaged in the decision making process and verbalized understanding of the options discussed and agreed with the final plan.    Collin Alejandro, DO

## 2022-12-09 ENCOUNTER — HOSPITAL ENCOUNTER (OUTPATIENT)
Facility: CLINIC | Age: 27
Discharge: HOME | End: 2022-12-09
Attending: FAMILY MEDICINE | Admitting: FAMILY MEDICINE
Payer: COMMERCIAL

## 2022-12-09 VITALS — RESPIRATION RATE: 18 BRPM | DIASTOLIC BLOOD PRESSURE: 62 MMHG | TEMPERATURE: 98.2 F | SYSTOLIC BLOOD PRESSURE: 101 MMHG

## 2022-12-09 LAB
CLUE CELLS: ABNORMAL
GP B STREP DNA SPEC QL NAA+PROBE: NEGATIVE
HBV CORE AB SERPL QL IA: NONREACTIVE
HBV SURFACE AB SERPL IA-ACNC: 27.1 M[IU]/ML
HBV SURFACE AB SERPL IA-ACNC: REACTIVE M[IU]/ML
HBV SURFACE AG SERPL QL IA: NONREACTIVE
HCV AB SERPL QL IA: NONREACTIVE
TRICHOMONAS, WET PREP: ABNORMAL
WBC'S/HIGH POWER FIELD, WET PREP: ABNORMAL
YEAST, WET PREP: ABNORMAL

## 2022-12-09 PROCEDURE — 59025 FETAL NON-STRESS TEST: CPT

## 2022-12-09 PROCEDURE — 87210 SMEAR WET MOUNT SALINE/INK: CPT | Performed by: FAMILY MEDICINE

## 2022-12-09 PROCEDURE — G0463 HOSPITAL OUTPT CLINIC VISIT: HCPCS | Mod: 25

## 2022-12-09 PROCEDURE — 99232 SBSQ HOSP IP/OBS MODERATE 35: CPT | Performed by: FAMILY MEDICINE

## 2022-12-09 ASSESSMENT — ACTIVITIES OF DAILY LIVING (ADL): ADLS_ACUITY_SCORE: 35

## 2022-12-09 NOTE — PROGRESS NOTES
[facility]  OB Triage Note    Blaire Owusu MRN# [id number]   Age: 27 year old YOB: 1995     Date of Admission: 2022 5:39 PM  Date of service: 2022.       History of Present Illness (Resident / Clinician):   Blaire Owusu is a patient of Marily Valdovinos and Dr. Alejandro from \[facility]\"" [facility].   Patient is a 27 year old  who is 38w2d pregnant with LIEN  Dec 21, 2022, by Patient's last menstrual period was 2022 (exact date). that is  consistent with 7w3d US.    The [facility] for [facility] of 1 day of brown discharge. Pt had a pap and cervical check yesterday in clinic. Immediately after pap had some bright red blood and thin liquid. Since then has noticed thicker brown discharge on pad twice. .    Reports irregular contractions (sometimes hard to tell if it's contraction or baby movement) for the past several weeks. Contraction intensity or frequency have not been at the level she would come in for them. denies fluid leakage otherwise. Fetal movement is .normal.    The prenatal course has been complicated by  - Bipolar in full remission, tapered off depakote and seroquel, still taking celexa.   - Breech at 33 weeks, cephalic since 35 weeks    Prenatal labs   Lab Results   Component Value Date    ABO O 2019    RH Pos 2019    AS Negative 2022    HEPBANG Nonreactive 2022    CHPCRT Negative 2018    GCPCRT Negative 2018    HGB 12.3 2022    GBS Positive (A) 2019       GBS was collected on 22 and negative           Obstetrical History:   She is a 27 year old   Her OB history:   OB History    Para Term  AB Living   2 1 1 0 0 1   SAB IAB Ectopic Multiple Live Births   0 0 0 0 1      # Outcome Date GA Lbr Warren/2nd Weight Sex Delivery Anes PTL Lv   2 Current            1 Term 19 37w1d 10:52 / 01:53 3.856 kg (8 lb 8 oz) M Vag-Spont Nitrous, EPI N RL      Name:  "MARQUEZ,CHRIS-MIKA      Apgar1: 7  Apgar5: 4              Immunzations:     Most Recent Immunizations   Administered Date(s) Administered     COVID-19 Vaccine 12+ (Pfizer) 05/09/2022     COVID-19 Vaccine Bivalent Booster 12+ (Pfizer) 10/13/2022     FLU 6-35 months 09/02/2011     HPV Quadrivalent 06/07/2011     Influenza (IIV3) PF 09/02/2011     Influenza Vaccine >6 months (Alfuria,Fluzone) 09/09/2022     MMR 06/11/2008     Meningococcal ACWY (Menactra ) 06/11/2008     TDAP Vaccine (Boostrix) 04/24/2019     Tdap (Adacel,Boostrix) 10/13/2022          Past Medical History:     Past Medical History:   Diagnosis Date     Anxiety      Depression      Depressive disorder      Eating disorder Bulemia     Maternal iron deficiency anemia complicating pregnancy in third trimester 11/3/2022     Mutilations, self     Cutting            Past Surgical History:     Past Surgical History:   Procedure Laterality Date     DENTAL SURGERY Bilateral 2012    [place] teeth removal, all 4            Family History:     Family History   Problem Relation Age of Onset     Hypertension Father           Social History:   no tobacco use  no alcohol use  no illicit drug use         Medications:   No current facility-administered medications on file prior to encounter.  citalopram (CELEXA) 20 MG tablet, Take 20 mg by mouth daily.  famotidine (PEPCID) 40 MG tablet, Take 1 tablet (40 mg) by mouth 2 times daily for 60 days  ondansetron (ZOFRAN ODT) 4 MG ODT tab, Take 1 tablet (4 mg) by mouth every 8 hours as needed for nausea  Prenatal Vit-Fe Fumarate-FA (PRENATAL MULTIVITAMIN W/IRON) 27-0.8 MG tablet, Take 1 tablet by mouth daily             Allergies:   Lactose         Physical Exam:   Vitals:   LMP 03/16/2022 (Exact Date)   0 lbs 0 oz  Estimated body mass index is 26.58 kg/m  as calculated from the following:    Height as of 11/18/22: 1.765 m (5' 9.5\").    Weight as of 12/8/22: 82.8 kg (182 lb 9.6 oz).    GEN: Awake, alert in no apparent distress " "  HEENT: grossly normal  NECK: no lymphadenopathy or thryoidomegaly  RESPIRATORY: no increased work of breathing  BACK:  no costovertebral angle tenderness   CARDIOVASCULAR:  ABDOMEN: gravid  PELVIC:    Cervix: \"finger tip\", external os 1 finger tip, internal os not open. Brown discharge on glove looked like old blood. (RN)  Confirmed VTX by Ultrasound? Yes    Electronic Fetal Monitoring:  O: Baseline rate normal 120 bpm  Variability moderate  Accelerations present  Decelerations not present    Assessment: Category I EFM interpretation suggests absence of concern for fetal metabolic acidemia at this time due to moderate variability and accelerations present    Uterine Activity irregular.      Strip reviewed at bedside    NST interpretation:  Ordered by  Shila Daugherty  Start [id number]   Stop time 1731  Baseline rate 120 bpm normal  Accelerations present  Decelerations not present  Interpretation: reactive      Assessment and Plan:   Assessment:   Blaire Owusu is a 27 year old  at 38w2d not in labor. Membranes are intact. GBS status is negative. Triage eval for vaginal spotting - reassuring NST and closed cervix.   Patient Active Problem List   Diagnosis     ADHD (attention deficit hyperactivity disorder)     Deliberate self-cutting     MILES (generalized anxiety disorder)     Major depression in remission (H)     Menorrhagia     Oppositional defiant disorder     Parent/child conflict     Risk of  labor     Encounter for supervision of high risk pregnancy in second trimester, antepartum     PROM (premature rupture of membranes)      (normal spontaneous vaginal delivery)     Heartburn during pregnancy, antepartum     Bipolar disorder in full remission, most recent episode unspecified type (H)     Maternal iron deficiency anemia complicating pregnancy in third trimester         Plan:   # Brown discharge  Likely old blood triggered by pap yesterday and friable gravid cervix.   - wet prep obtained " and will call patient if results are positive and send meds to a local pharmay    Discharge to home, return precautions provided    Follow up in clinic next week - needs appointment, will notify [facility]  to call patient on Monday AM     Shila Daugherty MD

## 2022-12-12 ENCOUNTER — HOSPITAL ENCOUNTER (INPATIENT)
Facility: CLINIC | Age: 27
LOS: 2 days | Discharge: HOME | End: 2022-12-14
Attending: ADVANCED PRACTICE MIDWIFE | Admitting: REGISTERED NURSE
Payer: COMMERCIAL

## 2022-12-12 ENCOUNTER — TELEPHONE (OUTPATIENT)
Dept: FAMILY MEDICINE | Facility: CLINIC | Age: 27
End: 2022-12-12

## 2022-12-12 ENCOUNTER — ANESTHESIA (OUTPATIENT)
Dept: OBGYN | Facility: CLINIC | Age: 27
End: 2022-12-12
Payer: COMMERCIAL

## 2022-12-12 ENCOUNTER — ANESTHESIA EVENT (OUTPATIENT)
Dept: OBGYN | Facility: CLINIC | Age: 27
End: 2022-12-12
Payer: COMMERCIAL

## 2022-12-12 LAB
ABO/RH(D): NORMAL
AMPHETAMINES UR QL SCN: ABNORMAL
ANTIBODY SCREEN: NEGATIVE
BKR LAB AP GYN ADEQUACY: NORMAL
BKR LAB AP GYN INTERPRETATION: NORMAL
BKR LAB AP HPV REFLEX: NORMAL
BKR LAB AP LMP: NORMAL
BKR LAB AP PREVIOUS ABNORMAL: NORMAL
CANNABINOIDS UR QL SCN: ABNORMAL
COCAINE UR QL: ABNORMAL
CREAT UR-MCNC: 64 MG/DL
ERYTHROCYTE [DISTWIDTH] IN BLOOD BY AUTOMATED COUNT: 16.6 % (ref 10–15)
HCT VFR BLD AUTO: 38.1 % (ref 35–47)
HGB BLD-MCNC: 12.8 G/DL (ref 11.7–15.7)
MCH RBC QN AUTO: 29.6 PG (ref 26.5–33)
MCHC RBC AUTO-ENTMCNC: 33.6 G/DL (ref 31.5–36.5)
MCV RBC AUTO: 88 FL (ref 78–100)
OPIATES UR QL SCN: ABNORMAL
PATH REPORT.COMMENTS IMP SPEC: NORMAL
PATH REPORT.COMMENTS IMP SPEC: NORMAL
PATH REPORT.RELEVANT HX SPEC: NORMAL
PCP UR QL SCN: ABNORMAL
PLATELET # BLD AUTO: 285 10E3/UL (ref 150–450)
RBC # BLD AUTO: 4.32 10E6/UL (ref 3.8–5.2)
SARS-COV-2 RNA RESP QL NAA+PROBE: NEGATIVE
SPECIMEN EXPIRATION DATE: NORMAL
WBC # BLD AUTO: 13.4 10E3/UL (ref 4–11)

## 2022-12-12 PROCEDURE — 86850 RBC ANTIBODY SCREEN: CPT | Performed by: REGISTERED NURSE

## 2022-12-12 PROCEDURE — 250N000011 HC RX IP 250 OP 636: Performed by: STUDENT IN AN ORGANIZED HEALTH CARE EDUCATION/TRAINING PROGRAM

## 2022-12-12 PROCEDURE — 120N000002 HC R&B MED SURG/OB UMMC

## 2022-12-12 PROCEDURE — 258N000003 HC RX IP 258 OP 636

## 2022-12-12 PROCEDURE — 370N000003 HC ANESTHESIA WARD SERVICE

## 2022-12-12 PROCEDURE — 86780 TREPONEMA PALLIDUM: CPT | Performed by: REGISTERED NURSE

## 2022-12-12 PROCEDURE — 00HU33Z INSERTION OF INFUSION DEVICE INTO SPINAL CANAL, PERCUTANEOUS APPROACH: ICD-10-PCS | Performed by: ANESTHESIOLOGY

## 2022-12-12 PROCEDURE — 86901 BLOOD TYPING SEROLOGIC RH(D): CPT | Performed by: REGISTERED NURSE

## 2022-12-12 PROCEDURE — 3E0R3BZ INTRODUCTION OF ANESTHETIC AGENT INTO SPINAL CANAL, PERCUTANEOUS APPROACH: ICD-10-PCS | Performed by: ANESTHESIOLOGY

## 2022-12-12 PROCEDURE — 85027 COMPLETE CBC AUTOMATED: CPT | Performed by: REGISTERED NURSE

## 2022-12-12 PROCEDURE — 80349 CANNABINOIDS NATURAL: CPT | Performed by: REGISTERED NURSE

## 2022-12-12 PROCEDURE — 80307 DRUG TEST PRSMV CHEM ANLYZR: CPT | Performed by: REGISTERED NURSE

## 2022-12-12 PROCEDURE — 258N000003 HC RX IP 258 OP 636: Performed by: ANESTHESIOLOGY

## 2022-12-12 PROCEDURE — 250N000011 HC RX IP 250 OP 636: Performed by: ANESTHESIOLOGY

## 2022-12-12 PROCEDURE — U0005 INFEC AGEN DETEC AMPLI PROBE: HCPCS | Performed by: REGISTERED NURSE

## 2022-12-12 RX ORDER — SODIUM CHLORIDE, SODIUM LACTATE, POTASSIUM CHLORIDE, CALCIUM CHLORIDE 600; 310; 30; 20 MG/100ML; MG/100ML; MG/100ML; MG/100ML
INJECTION, SOLUTION INTRAVENOUS
Status: COMPLETED
Start: 2022-12-12 | End: 2022-12-12

## 2022-12-12 RX ORDER — LIDOCAINE 40 MG/G
CREAM TOPICAL
Status: DISCONTINUED | OUTPATIENT
Start: 2022-12-12 | End: 2022-12-12 | Stop reason: HOSPADM

## 2022-12-12 RX ORDER — NALOXONE HYDROCHLORIDE 0.4 MG/ML
0.2 INJECTION, SOLUTION INTRAMUSCULAR; INTRAVENOUS; SUBCUTANEOUS
Status: DISCONTINUED | OUTPATIENT
Start: 2022-12-12 | End: 2022-12-13 | Stop reason: HOSPADM

## 2022-12-12 RX ORDER — ONDANSETRON 4 MG/1
4 TABLET, ORALLY DISINTEGRATING ORAL EVERY 6 HOURS PRN
Status: DISCONTINUED | OUTPATIENT
Start: 2022-12-12 | End: 2022-12-13 | Stop reason: HOSPADM

## 2022-12-12 RX ORDER — KETOROLAC TROMETHAMINE 30 MG/ML
30 INJECTION, SOLUTION INTRAMUSCULAR; INTRAVENOUS
Status: DISCONTINUED | OUTPATIENT
Start: 2022-12-12 | End: 2022-12-14 | Stop reason: HOSPADM

## 2022-12-12 RX ORDER — FENTANYL CITRATE-0.9 % NACL/PF 10 MCG/ML
100 PLASTIC BAG, INJECTION (ML) INTRAVENOUS EVERY 5 MIN PRN
Status: DISCONTINUED | OUTPATIENT
Start: 2022-12-12 | End: 2022-12-13 | Stop reason: HOSPADM

## 2022-12-12 RX ORDER — OXYTOCIN 10 [USP'U]/ML
INJECTION, SOLUTION INTRAMUSCULAR; INTRAVENOUS
Status: DISCONTINUED
Start: 2022-12-12 | End: 2022-12-13 | Stop reason: HOSPADM

## 2022-12-12 RX ORDER — NALBUPHINE HYDROCHLORIDE 10 MG/ML
2.5-5 INJECTION, SOLUTION INTRAMUSCULAR; INTRAVENOUS; SUBCUTANEOUS EVERY 6 HOURS PRN
Status: DISCONTINUED | OUTPATIENT
Start: 2022-12-12 | End: 2022-12-14 | Stop reason: HOSPADM

## 2022-12-12 RX ORDER — MISOPROSTOL 200 UG/1
800 TABLET ORAL
Status: DISCONTINUED | OUTPATIENT
Start: 2022-12-12 | End: 2022-12-13 | Stop reason: HOSPADM

## 2022-12-12 RX ORDER — CITRIC ACID/SODIUM CITRATE 334-500MG
30 SOLUTION, ORAL ORAL
Status: DISCONTINUED | OUTPATIENT
Start: 2022-12-12 | End: 2022-12-13 | Stop reason: HOSPADM

## 2022-12-12 RX ORDER — MISOPROSTOL 200 UG/1
400 TABLET ORAL
Status: DISCONTINUED | OUTPATIENT
Start: 2022-12-12 | End: 2022-12-13 | Stop reason: HOSPADM

## 2022-12-12 RX ORDER — CITALOPRAM HYDROBROMIDE 10 MG/1
20 TABLET ORAL DAILY
Status: DISCONTINUED | OUTPATIENT
Start: 2022-12-13 | End: 2022-12-13 | Stop reason: HOSPADM

## 2022-12-12 RX ORDER — TRANEXAMIC ACID 10 MG/ML
1 INJECTION, SOLUTION INTRAVENOUS EVERY 30 MIN PRN
Status: DISCONTINUED | OUTPATIENT
Start: 2022-12-12 | End: 2022-12-13 | Stop reason: HOSPADM

## 2022-12-12 RX ORDER — OXYTOCIN 10 [USP'U]/ML
10 INJECTION, SOLUTION INTRAMUSCULAR; INTRAVENOUS
Status: DISCONTINUED | OUTPATIENT
Start: 2022-12-12 | End: 2022-12-13 | Stop reason: HOSPADM

## 2022-12-12 RX ORDER — METOCLOPRAMIDE HYDROCHLORIDE 5 MG/ML
10 INJECTION INTRAMUSCULAR; INTRAVENOUS EVERY 6 HOURS PRN
Status: DISCONTINUED | OUTPATIENT
Start: 2022-12-12 | End: 2022-12-13 | Stop reason: HOSPADM

## 2022-12-12 RX ORDER — OXYTOCIN 10 [USP'U]/ML
10 INJECTION, SOLUTION INTRAMUSCULAR; INTRAVENOUS
Status: DISCONTINUED | OUTPATIENT
Start: 2022-12-12 | End: 2022-12-14 | Stop reason: HOSPADM

## 2022-12-12 RX ORDER — METHYLERGONOVINE MALEATE 0.2 MG/ML
200 INJECTION INTRAVENOUS
Status: DISCONTINUED | OUTPATIENT
Start: 2022-12-12 | End: 2022-12-13 | Stop reason: HOSPADM

## 2022-12-12 RX ORDER — CARBOPROST TROMETHAMINE 250 UG/ML
250 INJECTION, SOLUTION INTRAMUSCULAR
Status: DISCONTINUED | OUTPATIENT
Start: 2022-12-12 | End: 2022-12-13 | Stop reason: HOSPADM

## 2022-12-12 RX ORDER — ONDANSETRON 2 MG/ML
4 INJECTION INTRAMUSCULAR; INTRAVENOUS EVERY 6 HOURS PRN
Status: DISCONTINUED | OUTPATIENT
Start: 2022-12-12 | End: 2022-12-13 | Stop reason: HOSPADM

## 2022-12-12 RX ORDER — OXYTOCIN/0.9 % SODIUM CHLORIDE 30/500 ML
100-340 PLASTIC BAG, INJECTION (ML) INTRAVENOUS CONTINUOUS PRN
Status: DISCONTINUED | OUTPATIENT
Start: 2022-12-12 | End: 2022-12-14 | Stop reason: HOSPADM

## 2022-12-12 RX ORDER — LIDOCAINE HYDROCHLORIDE 10 MG/ML
INJECTION, SOLUTION EPIDURAL; INFILTRATION; INTRACAUDAL; PERINEURAL
Status: DISCONTINUED
Start: 2022-12-12 | End: 2022-12-13 | Stop reason: HOSPADM

## 2022-12-12 RX ORDER — PROCHLORPERAZINE MALEATE 10 MG
10 TABLET ORAL EVERY 6 HOURS PRN
Status: DISCONTINUED | OUTPATIENT
Start: 2022-12-12 | End: 2022-12-13 | Stop reason: HOSPADM

## 2022-12-12 RX ORDER — IBUPROFEN 800 MG/1
800 TABLET, FILM COATED ORAL
Status: DISCONTINUED | OUTPATIENT
Start: 2022-12-12 | End: 2022-12-14 | Stop reason: HOSPADM

## 2022-12-12 RX ORDER — MISOPROSTOL 200 UG/1
TABLET ORAL
Status: DISCONTINUED
Start: 2022-12-12 | End: 2022-12-13 | Stop reason: HOSPADM

## 2022-12-12 RX ORDER — SODIUM CHLORIDE, SODIUM LACTATE, POTASSIUM CHLORIDE, CALCIUM CHLORIDE 600; 310; 30; 20 MG/100ML; MG/100ML; MG/100ML; MG/100ML
INJECTION, SOLUTION INTRAVENOUS
Status: COMPLETED
Start: 2022-12-12 | End: 2022-12-13

## 2022-12-12 RX ORDER — FENTANYL CITRATE 50 UG/ML
100 INJECTION, SOLUTION INTRAMUSCULAR; INTRAVENOUS
Status: DISCONTINUED | OUTPATIENT
Start: 2022-12-12 | End: 2022-12-13 | Stop reason: HOSPADM

## 2022-12-12 RX ORDER — FENTANYL/ROPIVACAINE/NS/PF 2MCG/ML-.1
PLASTIC BAG, INJECTION (ML) EPIDURAL
Status: DISCONTINUED | OUTPATIENT
Start: 2022-12-12 | End: 2022-12-13 | Stop reason: HOSPADM

## 2022-12-12 RX ORDER — NALOXONE HYDROCHLORIDE 0.4 MG/ML
0.4 INJECTION, SOLUTION INTRAMUSCULAR; INTRAVENOUS; SUBCUTANEOUS
Status: DISCONTINUED | OUTPATIENT
Start: 2022-12-12 | End: 2022-12-13 | Stop reason: HOSPADM

## 2022-12-12 RX ORDER — LIDOCAINE 40 MG/G
CREAM TOPICAL
Status: DISCONTINUED | OUTPATIENT
Start: 2022-12-12 | End: 2022-12-13 | Stop reason: HOSPADM

## 2022-12-12 RX ORDER — PROCHLORPERAZINE 25 MG
25 SUPPOSITORY, RECTAL RECTAL EVERY 12 HOURS PRN
Status: DISCONTINUED | OUTPATIENT
Start: 2022-12-12 | End: 2022-12-13 | Stop reason: HOSPADM

## 2022-12-12 RX ORDER — ACETAMINOPHEN 325 MG/1
650 TABLET ORAL EVERY 4 HOURS PRN
Status: DISCONTINUED | OUTPATIENT
Start: 2022-12-12 | End: 2022-12-13 | Stop reason: HOSPADM

## 2022-12-12 RX ORDER — OXYTOCIN/0.9 % SODIUM CHLORIDE 30/500 ML
PLASTIC BAG, INJECTION (ML) INTRAVENOUS
Status: DISCONTINUED
Start: 2022-12-12 | End: 2022-12-13 | Stop reason: HOSPADM

## 2022-12-12 RX ORDER — OXYTOCIN/0.9 % SODIUM CHLORIDE 30/500 ML
340 PLASTIC BAG, INJECTION (ML) INTRAVENOUS CONTINUOUS PRN
Status: DISCONTINUED | OUTPATIENT
Start: 2022-12-12 | End: 2022-12-13 | Stop reason: HOSPADM

## 2022-12-12 RX ORDER — METOCLOPRAMIDE 10 MG/1
10 TABLET ORAL EVERY 6 HOURS PRN
Status: DISCONTINUED | OUTPATIENT
Start: 2022-12-12 | End: 2022-12-13 | Stop reason: HOSPADM

## 2022-12-12 RX ADMIN — SODIUM CHLORIDE, POTASSIUM CHLORIDE, SODIUM LACTATE AND CALCIUM CHLORIDE 1000 ML: 600; 310; 30; 20 INJECTION, SOLUTION INTRAVENOUS at 22:48

## 2022-12-12 RX ADMIN — Medication: at 23:54

## 2022-12-12 RX ADMIN — BUPIVACAINE HYDROCHLORIDE 10 ML: 2.5 INJECTION, SOLUTION EPIDURAL; INFILTRATION; INTRACAUDAL at 23:35

## 2022-12-12 ASSESSMENT — ACTIVITIES OF DAILY LIVING (ADL)
CHANGE_IN_FUNCTIONAL_STATUS_SINCE_ONSET_OF_CURRENT_ILLNESS/INJURY: NO
ADLS_ACUITY_SCORE: 20
WALKING_OR_CLIMBING_STAIRS_DIFFICULTY: NO
DIFFICULTY_EATING/SWALLOWING: NO
VISION_MANAGEMENT: GLASSES
CONCENTRATING,_REMEMBERING_OR_MAKING_DECISIONS_DIFFICULTY: NO
DOING_ERRANDS_INDEPENDENTLY_DIFFICULTY: NO
WEAR_GLASSES_OR_BLIND: YES
DRESSING/BATHING_DIFFICULTY: NO
TOILETING_ISSUES: NO
FALL_HISTORY_WITHIN_LAST_SIX_MONTHS: NO

## 2022-12-12 NOTE — TELEPHONE ENCOUNTER
Reached out to patient and scheduled with Dr. Pichardo at 11:20 for estab OB visit    Miranda Villarreal RN

## 2022-12-12 NOTE — TELEPHONE ENCOUNTER
[facility] phone call message- general phone call:    Reason for call: The patient is currently 39 wks pregnant and there is no openings for appts this week.    Return call needed: Yes    OK to leave a message on voice mail? Yes       Primary language: English      needed? No    Call taken on December 12, 2022 at 12:55 PM by Laure Doty

## 2022-12-13 ENCOUNTER — TELEPHONE (OUTPATIENT)
Dept: FAMILY MEDICINE | Facility: CLINIC | Age: 27
End: 2022-12-13

## 2022-12-13 LAB — T PALLIDUM AB SER QL: NONREACTIVE

## 2022-12-13 PROCEDURE — 250N000011 HC RX IP 250 OP 636: Performed by: REGISTERED NURSE

## 2022-12-13 PROCEDURE — 250N000013 HC RX MED GY IP 250 OP 250 PS 637: Performed by: REGISTERED NURSE

## 2022-12-13 PROCEDURE — 999N000016 HC STATISTIC ATTENDANCE AT DELIVERY

## 2022-12-13 PROCEDURE — 722N000001 HC LABOR CARE VAGINAL DELIVERY SINGLE

## 2022-12-13 PROCEDURE — 258N000003 HC RX IP 258 OP 636

## 2022-12-13 PROCEDURE — G0463 HOSPITAL OUTPT CLINIC VISIT: HCPCS

## 2022-12-13 PROCEDURE — 0UQMXZZ REPAIR VULVA, EXTERNAL APPROACH: ICD-10-PCS | Performed by: REGISTERED NURSE

## 2022-12-13 PROCEDURE — 59409 OBSTETRICAL CARE: CPT | Performed by: REGISTERED NURSE

## 2022-12-13 PROCEDURE — 250N000009 HC RX 250: Performed by: REGISTERED NURSE

## 2022-12-13 PROCEDURE — 120N000002 HC R&B MED SURG/OB UMMC

## 2022-12-13 PROCEDURE — 10907ZC DRAINAGE OF AMNIOTIC FLUID, THERAPEUTIC FROM PRODUCTS OF CONCEPTION, VIA NATURAL OR ARTIFICIAL OPENING: ICD-10-PCS | Performed by: REGISTERED NURSE

## 2022-12-13 RX ORDER — HYDROCORTISONE 25 MG/G
CREAM TOPICAL 3 TIMES DAILY PRN
Status: DISCONTINUED | OUTPATIENT
Start: 2022-12-13 | End: 2022-12-14 | Stop reason: HOSPADM

## 2022-12-13 RX ORDER — SODIUM CHLORIDE, SODIUM LACTATE, POTASSIUM CHLORIDE, CALCIUM CHLORIDE 600; 310; 30; 20 MG/100ML; MG/100ML; MG/100ML; MG/100ML
INJECTION, SOLUTION INTRAVENOUS CONTINUOUS
Status: DISCONTINUED | OUTPATIENT
Start: 2022-12-13 | End: 2022-12-14 | Stop reason: HOSPADM

## 2022-12-13 RX ORDER — METHYLERGONOVINE MALEATE 0.2 MG/ML
200 INJECTION INTRAVENOUS
Status: DISCONTINUED | OUTPATIENT
Start: 2022-12-13 | End: 2022-12-14 | Stop reason: HOSPADM

## 2022-12-13 RX ORDER — CARBOPROST TROMETHAMINE 250 UG/ML
250 INJECTION, SOLUTION INTRAMUSCULAR
Status: DISCONTINUED | OUTPATIENT
Start: 2022-12-13 | End: 2022-12-14 | Stop reason: HOSPADM

## 2022-12-13 RX ORDER — NALOXONE HYDROCHLORIDE 1 MG/ML
0.4 INJECTION INTRAMUSCULAR; INTRAVENOUS; SUBCUTANEOUS
Status: DISCONTINUED | OUTPATIENT
Start: 2022-12-13 | End: 2022-12-14 | Stop reason: HOSPADM

## 2022-12-13 RX ORDER — MISOPROSTOL 200 UG/1
400 TABLET ORAL
Status: DISCONTINUED | OUTPATIENT
Start: 2022-12-13 | End: 2022-12-14 | Stop reason: HOSPADM

## 2022-12-13 RX ORDER — OXYTOCIN/0.9 % SODIUM CHLORIDE 30/500 ML
340 PLASTIC BAG, INJECTION (ML) INTRAVENOUS CONTINUOUS PRN
Status: DISCONTINUED | OUTPATIENT
Start: 2022-12-13 | End: 2022-12-14 | Stop reason: HOSPADM

## 2022-12-13 RX ORDER — NALOXONE HYDROCHLORIDE 1 MG/ML
0.2 INJECTION INTRAMUSCULAR; INTRAVENOUS; SUBCUTANEOUS
Status: DISCONTINUED | OUTPATIENT
Start: 2022-12-13 | End: 2022-12-14 | Stop reason: HOSPADM

## 2022-12-13 RX ORDER — BISACODYL 10 MG
10 SUPPOSITORY, RECTAL RECTAL DAILY PRN
Status: DISCONTINUED | OUTPATIENT
Start: 2022-12-13 | End: 2022-12-14 | Stop reason: HOSPADM

## 2022-12-13 RX ORDER — CALCIUM CARBONATE 500 MG/1
1000 TABLET, CHEWABLE ORAL DAILY PRN
Status: DISCONTINUED | OUTPATIENT
Start: 2022-12-13 | End: 2022-12-14 | Stop reason: HOSPADM

## 2022-12-13 RX ORDER — MISOPROSTOL 200 UG/1
800 TABLET ORAL
Status: DISCONTINUED | OUTPATIENT
Start: 2022-12-13 | End: 2022-12-14 | Stop reason: HOSPADM

## 2022-12-13 RX ORDER — OXYTOCIN 10 [USP'U]/ML
10 INJECTION, SOLUTION INTRAMUSCULAR; INTRAVENOUS
Status: DISCONTINUED | OUTPATIENT
Start: 2022-12-13 | End: 2022-12-14 | Stop reason: HOSPADM

## 2022-12-13 RX ORDER — MODIFIED LANOLIN
OINTMENT (GRAM) TOPICAL
Status: DISCONTINUED | OUTPATIENT
Start: 2022-12-13 | End: 2022-12-14 | Stop reason: HOSPADM

## 2022-12-13 RX ORDER — ACETAMINOPHEN 325 MG/1
650 TABLET ORAL EVERY 4 HOURS PRN
Status: DISCONTINUED | OUTPATIENT
Start: 2022-12-13 | End: 2022-12-14 | Stop reason: HOSPADM

## 2022-12-13 RX ORDER — TRANEXAMIC ACID 10 MG/ML
1 INJECTION, SOLUTION INTRAVENOUS EVERY 30 MIN PRN
Status: DISCONTINUED | OUTPATIENT
Start: 2022-12-13 | End: 2022-12-14 | Stop reason: HOSPADM

## 2022-12-13 RX ORDER — CITALOPRAM HYDROBROMIDE 20 MG/1
20 TABLET ORAL DAILY
Status: DISCONTINUED | OUTPATIENT
Start: 2022-12-13 | End: 2022-12-14 | Stop reason: HOSPADM

## 2022-12-13 RX ORDER — IBUPROFEN 800 MG/1
800 TABLET, FILM COATED ORAL EVERY 6 HOURS PRN
Status: DISCONTINUED | OUTPATIENT
Start: 2022-12-13 | End: 2022-12-14 | Stop reason: HOSPADM

## 2022-12-13 RX ORDER — DOCUSATE SODIUM 100 MG/1
100 CAPSULE, LIQUID FILLED ORAL DAILY
Status: DISCONTINUED | OUTPATIENT
Start: 2022-12-13 | End: 2022-12-14 | Stop reason: HOSPADM

## 2022-12-13 RX ADMIN — ACETAMINOPHEN 650 MG: 325 TABLET, FILM COATED ORAL at 09:23

## 2022-12-13 RX ADMIN — Medication 340 ML/HR: at 04:10

## 2022-12-13 RX ADMIN — KETOROLAC TROMETHAMINE 30 MG: 30 INJECTION, SOLUTION INTRAMUSCULAR; INTRAVENOUS at 05:00

## 2022-12-13 RX ADMIN — CITALOPRAM HYDROBROMIDE 20 MG: 20 TABLET ORAL at 20:56

## 2022-12-13 RX ADMIN — SODIUM CHLORIDE, POTASSIUM CHLORIDE, SODIUM LACTATE AND CALCIUM CHLORIDE: 600; 310; 30; 20 INJECTION, SOLUTION INTRAVENOUS at 00:15

## 2022-12-13 RX ADMIN — SODIUM CHLORIDE, SODIUM LACTATE, POTASSIUM CHLORIDE, CALCIUM CHLORIDE: 600; 310; 30; 20 INJECTION, SOLUTION INTRAVENOUS at 00:15

## 2022-12-13 RX ADMIN — DOCUSATE SODIUM 100 MG: 100 CAPSULE, LIQUID FILLED ORAL at 09:23

## 2022-12-13 RX ADMIN — LIDOCAINE HYDROCHLORIDE 20 ML: 10 INJECTION, SOLUTION EPIDURAL; INFILTRATION; INTRACAUDAL; PERINEURAL at 04:15

## 2022-12-13 RX ADMIN — ONDANSETRON 4 MG: 2 INJECTION INTRAMUSCULAR; INTRAVENOUS at 03:18

## 2022-12-13 RX ADMIN — CALCIUM CARBONATE (ANTACID) CHEW TAB 500 MG 500 MG: 500 CHEW TAB at 01:32

## 2022-12-13 RX ADMIN — IBUPROFEN 800 MG: 800 TABLET, FILM COATED ORAL at 20:56

## 2022-12-13 ASSESSMENT — ACTIVITIES OF DAILY LIVING (ADL)
ADLS_ACUITY_SCORE: 20

## 2022-12-13 NOTE — L&D DELIVERY NOTE
Delivery Summary    Blaire Owusu MRN# [id number]   Age: 27 year old YOB: 1995     ASSESSMENT & PLAN:   Routine  care     DELIVERY NOTE:    Labor:  Blaire is a 27 year old female who [facility] at 2200 on 2022 . Blaire reported contractions for the past 8 hours.  She was accompanied by LUCIAN Le, who is supportive. Following her arrival to the hospital, an assessment revealed Blaire to be in early labor with a cervical exam of 4/70/0. Blaire labored in multiple positions. She received an epidural 2330 and continued to labor on her own. At 0300 she felt increased pressure and SVE in bed revealed complete dilation at 0308. Membranes were bulging and AROM done for small amount clear fluid.       Birth:  Progressed to complete and pushed effectively with CNM coaching. FHR with variable decels with pushing effort, moderate variability throughout. NICU called to be present at delivery due to maternal selective serotonin reuptake inhibitor use during pregnancy. Head delivered ROP and restituted to ROT . No nuchal cord. Shoulders easily delivered under maternal effort.  Live female  delivered at 0408 over an intact perineum under epidural anesthesia. Infant directly to maternal abdomen, skin to skin. Delayed cord clamping for 1 minutes then clamped x2 and cut by CNM. Infant then brought to warmer for assessment by NNP, see provider note. IV pitocin infusing immediately after baby with pt's consent. Cord blood obtained for typing. Cord segment for gases sent. Intact placenta spontaneously delivered via [place]berg at 0412.  3 vessel cord. Fundus firm @ U. Vagina, perineum, and rectum inspected, found to be intact.  Bilateral labial lacerations noted, 1% infused prior and right laceration repaired with 4-0 suture using one interrupted stitch. Hemostasis noted. Mother stable. Infant admitted to NICU for additional respiratory support, see provider note.      Delivery Note:     IUP at 38 weeks gestation delivered on 2022.     delivery of a viable Female infant.  Weight : 7lb4oz  Apgars 1 minute 6; 5 minute 7, 10 minute 8   Labor was spontaneous.  Medications administered  in labor:  Pain Rx Epidural; Antibiotics No; Other none  Perineum: Intact, bilateral labial laceration, right repaired with x1 interrupted stitch   Placenta-mechanism: spontaneous, intact,  with a 3 vessel cord. IV oxytocin was given After delivery of baby  Quantitative Blood Loss was 200cc.   Complications of labor and delivery: Category two FHT tracing, infant admitted to NICU   Anticipated Discharge Date: 22  Birth attendants: WESLY Boss CNM, Gagandeep Singh, DO (PGY2 FM resident)    WESLY Boss CNM               Yamil Owusu-Blaire [[id number]]    Labor Event Times    Dilation complete date: 22 Complete time:  3:08 AM   Start pushing date/time: 2022 0311      Labor Length    2nd Stage (hrs): 0 (min): 59   3rd Stage (hrs): 0 (min): 4      Labor Events     labor?: No   steroids: None  Labor Type: Spontaneous  Predominate monitoring during 1st stage: continuous electronic fetal monitoring     Rupture date/time: 22 0303   Rupture type: Artificial Rupture of Membranes  Fluid color: Clear     Augmentation: AROM     Delivery/Placenta Date and Time    Delivery Date: 22 Delivery Time:  4:08 AM   Placenta Date/Time: 2022  4:12 AM  Oxytocin given at the time of delivery: after delivery of baby  Delivering clinician: Elizabeth Qureshi APRN CNM   Other personnel present at delivery:  Provider Role   Elizabeth Qureshi APRN CNM Certified Nurse Midwife   Collin Alejandro DO Resident   Talia Betancur RN Delivery Nurse   Vandana Melgar RN Delivery Assist         Vaginal Counts     Initial count performed by 2 team members:  Two Team Members   KEV Boss RN       Needles Suture Needles Sponges  (RETIRED) Instruments   Initial counts 2  5    Added to count  1     Relief counts       Final counts 2 1 5          Placed during labor Accounted for at the end of labor   FSE No NA   IUPC No NA   Cervidil No NA              Final count performed by 2 team members:  Two Team Members   Talia Betancur RN   Paola Qureshi CNM      Final count correct?: Yes     Apgars    Living status: Living   1 Minute 5 Minute 10 Minute 15 Minute 20 Minute   Skin color: 1  1  1      Heart rate: 1  2  2      Reflex irritability: 1  2  2      Muscle tone: 2  1  2      Respiratory effort: 1  1  1      Total: 6  7  8      Apgars assigned by: WESLY PIERRE     Cord    Cord Complications: None               Stem cell collection?: No        Resuscitation    Methods: NCPAP, Oximetry, Oxygen  [facility] Care at Delivery: [facility] Delivery Note    Asked by WESLY Telles to attend the delivery of this term, female infant with a gestational age of 38 6/7 weeks secondary to materal selective serotonin reuptake inhibitor use..      Infant delivered at 0408 hours on 2022. Infant had spontaneous respirations at birth. She was given 60 seconds of DCC. She was placed on a warmer, dried, stimulated, and orally suctioned for large amount of clear fluid at birth. Apgars were 6 at one minute and 7 at five minutes of age. Gross PE is WNL except for **. Infant was shown to mother and father and will be transferred to the ** for further care.    Chaparrita Gallegos, [facility] 2022 5:03 AM         [facility] Measurements    Weight: 7 lb 4.1 oz       Labor Events and Shoulder Dystocia    Fetal Tracing Prior to Delivery: Category 2  Fetal Tracing Comments: Moderate variability, variable decels with pushing not tracing through completely   Shoulder dystocia present?: Neg     Delivery (Maternal) (Provider to Complete) ([id number])    Episiotomy: None  Perineal lacerations: None    Labial laceration: bilateral Repaired?: Yes   Repair suture: 4-0 Vicryl  Number  of repair packets: 1  Genital tract inspection done: Pos     Blood Loss  Mother: Blaire Owusu #[id number]   Start of Mother's Information    Delivery Blood Loss  12/12/22 1608 - 12/13/22 0506    Delivery QBL (mL) Hospital Encounter 215 mL    Total  215 mL         End of Mother's Information  Mother: Blaire Owusu #[id number]          Delivery - Provider to Complete ([id number])    Delivering clinician: Elizabeth Qureshi APRN CNM  CNM Care: Exclusive CNM care in labor  Attempted Delivery Types (Choose all that apply): Spontaneous Vaginal Delivery  Delivery Type (Choose the 1 that will go to the Birth History): Vaginal, Spontaneous                   Other personnel:  Provider Role   Elizabeth Qureshi APRN CNM Certified Nurse Midwife   Collin Alejandro DO Resident   Talia Betancur, RN Delivery Nurse   Vandana Melgar RN Delivery Assist                Placenta    Date/Time: 12/13/2022  4:12 AM  Removal: Spontaneous  Comments: Dionicio   Disposition: Hospital disposal           Anesthesia    Method: Epidural  Cervical dilation at placement: 4-7                Presentation and Position    Presentation: Vertex    Position: Right Occiput Posterior                 WESLY Boss CNM

## 2022-12-13 NOTE — PLAN OF CARE
Vaginal Delivery Note   of viable Female with Paola Qureshi CNM in attendance.  NICU/Nursery LEANNA Ross present.  Infant with spontaneous cry, to mother's abdomen, dried and stimulated.  APGAR at 1 minute:  6 and APGAR at 5 minutes:  7.  Placenta delivered with out complication, none, bilateral periurethral laceration, with repair, mani cares provided.  Mother in stable condition. Baby transferred to NICU.

## 2022-12-13 NOTE — PROGRESS NOTES
[facility]  Intrapartum Progress Note  2022 2:44 AM  Date of service: 2022    SUBJECTIVE:  2:00AM Strip Check    OBJECTIVE:   Patient Vitals for the past 8 hrs:   BP Temp Temp src Resp SpO2   22 0035 105/56 -- -- -- --   22 0031 111/58 -- -- -- --   22 0025 108/59 -- -- -- --   22 0021 111/59 -- -- -- --   22 0016 119/58 -- -- -- --   22 0015 114/59 -- -- -- --   22 0010 107/58 -- -- -- --   22 0005 110/55 97.6  F (36.4  C) Oral 18 100 %   22 0001 112/57 -- -- -- --   225 119/65 -- -- -- 98 %   22 2349 127/67 -- -- -- --   22 2347 125/72 -- -- -- --   22 2345 124/70 -- -- -- 97 %   22 2343 117/64 -- -- -- --   22 2341 130/69 -- -- -- --   22 2339 129/67 -- -- -- --   22 -- -- -- -- 97 %   22 126/73 -- -- 18 --       Electronic Fetal Monitoring:  O: Baseline rate normal  Variability moderate  Accelerations present  Decelerations not present    Assessment: Category I EFM interpretation suggests absence of concern for fetal metabolic acidemia at this time due to moderate variability and accelerations present    Uterine Activity normal.      Strip reviewed remotely via [employer]     ASSESSMENT and PLAN:  Blaire Owusu is a 27 year old  at 38w5d in early labor.     Pregnancy has been complicated by:  - Bipolar Disorder, MDD, and MILES  - Psychiatric medication use (currently on Celexa, tapered off Quetiapine)  - Anemia affect third trimester (now s/p IV iron x3)  - GERD/Nausea/Vomit  - Breech Presentation at 35w (now cephalic spontaneously)   - High Lying placenta (visualized on POCUS)    2:00AM strip check. Baby's HR is steady at around 130s, variability a tad less than before. Contractions have also slowed down a tad. Baby is likely in sleep phase. We continue to monitor. Anticipate . Will do in-person check at 4AM with CNM.    ADDENDUM  4:43AM  Called by RN team at ~3:00 that patient was feeling more pressure and wanted rest of team to come assess. Labor progressed into delivery. See delivery note for details.     Patient Active Problem List   Diagnosis     ADHD (attention deficit hyperactivity disorder)     Deliberate self-cutting     MILES (generalized anxiety disorder)     Major depression in remission (H)     Menorrhagia     Oppositional defiant disorder     Parent/child conflict     Risk of  labor     Encounter for supervision of high risk pregnancy in second trimester, antepartum     PROM (premature rupture of membranes)      (normal spontaneous vaginal delivery)     Heartburn during pregnancy, antepartum     Bipolar disorder in full remission, most recent episode unspecified type (H)     Maternal iron deficiency anemia complicating pregnancy in third trimester     Labor and delivery, indication for care       # Pain Assessment:  Current Pain Score 2022   Patient currently in pain? denies   Pain score (0-10) -   Pain descriptors -     Collin Alejandro, DO

## 2022-12-13 NOTE — LACTATION NOTE
"This note was copied from a baby's chart.  D:  I met with Blaire; Zita is her 2nd baby.  She nursed her 1st for 18 months until stopping due to medication change.  She is normally in good health (per chart hx bipolar, self-harm, depression, anxiety, ADHD, eating disorder), takes Celexa (is off Seroquel), and has no history of breast/chest surgery or trauma.  She has already started to pump 12-15 ml.   I:  I gave her introductory materials and went over pumping guidelines.  I reviewed use of the pump, cleaning, labeling, storing, and logging.   I explained how to access the videos on hand expression and hands-on pumping.  I helped her make a hands-free pumping bra.  We discussed skin to skin holding and how to reach your lactation goals.  We talked about medications during breastfeeding.  She verbalized understanding via teachback.  I advised her to call her insurance company about pump coverage.  I moved her to Maintain setting and discussed use of the letdown button.  Feeding goal is breastfeeding.  Her 1st child was in the NICU for similar issues for about 5 days and she was eventually able to get him exclusively breastfeeding \"with a lot of persistence\".  A:  Mom has information she needs to initiate her supply.   P:  Will continue to provide lactation support.    Olga Streeter, RNC, IBCLC            "

## 2022-12-13 NOTE — CARE PLAN
Data: Blaire Owusu transferred to [facility] via wheelchair at 0630. Stop in NICU to visit baby.  Action: Receiving unit notified of transfer: Yes. Patient and family notified of room change. Report given to LEANNA Nuno at 0557. Belongings sent to receiving unit. Accompanied by Registered Nurse. Oriented patient to surroundings. Call light within reach.  Response: Patient tolerated transfer and is stable.

## 2022-12-13 NOTE — H&P
"ADMIT NOTE  =================  38w5d    Blaire Owusu is a 27 year old female with an Patient's last menstrual period was 2022 (exact date). and Estimated Date of Delivery: Dec 21, 2022 [facility] at 10:23 PM in early labor.     HPI  ================  Reports that she has been having contractions on and off for the past 2 days, they became much more intense this afternoon and closer together, currently feeling them every 3-4 minutes. States she had a cervical exam in clinic 3 days ago and was 1 cm at that time. Denies LOF, vaginal bleeding.     Denies fever, cough, SOB or chest pain.   Agreeable to Covid-19 testing  Contractions- every 3-4 minutes  Fetal movement- active  ROM- no.  Vaginal bleeding- none  GBS- negative  FOB- is involved  Other labor support- none    Weight gain- 163 - 177 lbs, Total weight gain- 15 lbs  Height- 5'10\"  BMI- 23  First prenatal visit at 7 weeks, Total visits- 8    PROBLEM LIST  =================  Patient Active Problem List    Diagnosis Date Noted     Labor and delivery, indication for care 2022     Priority: Medium     Maternal iron deficiency anemia complicating pregnancy in third trimester 2022     Priority: Medium     Bipolar disorder in full remission, most recent episode unspecified type (H) 2022     Priority: Medium     Heartburn during pregnancy, antepartum 2019     Priority: Medium      (normal spontaneous vaginal delivery) 2019     Priority: Medium     PROM (premature rupture of membranes) 2019     Priority: Medium     Encounter for supervision of high risk pregnancy in second trimester, antepartum 2018     Priority: Medium     HIGH RISK  with LIEN  Dec 21, 2022  MATERNAL HISTORY/DIAGNOSES   1. Bipolar disorder, depression, history of cutting (currently on slow Seroquel taper)  2. Due for pap- would like to get at 36w when obtaining GBS  Blood Type:   OB PROVIDERS   1. " Marily Vega, pager [phone]  2. Collin Alejandro, pager [phone]  PRENATAL CARE PLAN     Consults: Genetics (1st trimester genetic screening), social work to assist with insurance (see  note)-now on 's insurance    Ultrasounds: Dating on 22, anatomy US @18w, FMF recommending repeat US at 28w d/t medication  DELIVERY PLAN     Planned mode/timing of delivery: vaginal    Notifications during labor: Page resident OB providers above    Post Partum Contraception: OCP (had paraguard before and didn't like the bleeding)    Does not want to know sex until delivery  GBS:       Risk of  labor 2018     Priority: Medium     Q13,15,17: History of Substance Abuse  Q20: Depression, bi-polar, anxiety, schizophrenia this pregnancy       MILES (generalized anxiety disorder) 2018     Priority: Medium     Major depression in remission (H) 2018     Priority: Medium     Deliberate self-cutting 2012     Priority: Medium     Overview:   Long standing issue with multiple inpatient treatments.        Menorrhagia 2011     Priority: Medium     Overview: Previously on OCPs.       Oppositional defiant disorder 2011     Priority: Medium     Parent/child conflict 2011     Priority: Medium     ADHD (attention deficit hyperactivity disorder) 2010     Priority: Medium       HISTORIES  ============  Allergies   Allergen Reactions     Lactose GI Disturbance     Past Medical History:   Diagnosis Date     Anxiety      Depression      Depressive disorder      Eating disorder Bulemia     Maternal iron deficiency anemia complicating pregnancy in third trimester 11/3/2022     Mutilations, self     Cutting     Past Surgical History:   Procedure Laterality Date     DENTAL SURGERY Bilateral 2012    [place] teeth removal, all 4   .  Family History   Problem Relation Age of Onset     Hypertension Father      Social History     Tobacco Use     Smoking status: Former     Packs/day: 0.10     Years:  1.00     Pack years: 0.10     Types: Cigarettes     Quit date: 2018     Years since quittin.0     Smokeless tobacco: Never   Substance Use Topics     Alcohol use: Yes     Comment: bottle of Vodka/month     OB History    Para Term  AB Living   2 1 1 0 0 1   SAB IAB Ectopic Multiple Live Births   0 0 0 0 1      # Outcome Date GA Lbr [place]/2nd Weight Sex Delivery Anes PTL Lv   2 Current            1 Term 19 37w1d 10:52 / 01:53 3.856 kg (8 lb 8 oz) M Vag-Spont Nitrous, EPI N RL      Name: MARQUEZ,MALE-RYSA      Apgar1: 7  Apgar5: 4        LABS:   ===========  Prenatal Labs:  Rhogam not indicated   Lab Results   Component Value Date    ABO O 2019    RH Pos 2019    AS Negative 2022    RUQIGG Positive 2022    HEPBANG Nonreactive 2022    HGB 12.3 2022    HIAGAB Nonreactive 2022    GLU1 64 (L) 2022     Rubella immune  Lab Results   Component Value Date    GBS Positive 2019     Other labs:  COVID-19 PCR Results    COVID-19 PCR Results   No data to display.         COVID-19 Antibody Results, Testing for Immunity    COVID-19 Antibody Results, Testing for Immunity   No data to display.            No results found for this or any previous visit (from the past 24 hour(s)).    ROS  =========  Pt denies significant respiratory, cardiovacular, GI, or muscular/skeletalcomplaints.    See RN data base ROS.     PHYSICAL EXAM:  ===============  /73   Resp 18   LMP 2022 (Exact Date)   SpO2 97%   General appearance: uncomfortable with contractions  GENERAL APPEARANCE: healthy, alert and no distress  RESP: breathing comfortably on room air   CV: well perfused   ABDOMEN:  soft, nontender, no epigastric pain  SKIN: no suspicious lesions or rashes  NEURO: Denies headache, blurred vision, other vision changes  PSYCH: mentation appears normal. and affect normal/bright  Legs: No edema     Abdomen: gravid, vertex fetus per Leopold's, non-tender  between contractions.   Cephalic presentation confirmed by BSUS  EFW-  7 lbs.   CONTRACTIONS: every 3-4 minutes  FETAL HEART TONES: continuous EFM- baseline 120 with moderate variability and positive accelerations. No decelerations.  PELVIC EXAM: 4/ 70%/ Mid/ soft/ 0   BLOODY SHOW: no   ROM:no  FLUID: none  ROMPLUS: not done    # Pain Assessment:  Current Pain Score 2022   Patient currently in pain? denies   Pain score (0-10) -   Pain descriptors -   - Blaire is experiencing pain due to contractions. Pain management was discussed and the plan was created in a collaborative fashion.  Blaire's response to the current recommendations: engaged  - Pharmacologic adjuvants: epidural           ASSESSMENT:  ==============  IUP @ 38w5d admitted in early labor   NST REACTIVE  Fetal Heart Tones - category one  GBS- negative  Covid- pending  H/o marijuana use during pregnancy  Bipolar disorder - on Seroquel taper  Depression - selective serotonin reuptake inhibitor use during pregnancy    Patient Active Problem List   Diagnosis     ADHD (attention deficit hyperactivity disorder)     Deliberate self-cutting     MILES (generalized anxiety disorder)     Major depression in remission (H)     Menorrhagia     Oppositional defiant disorder     Parent/child conflict     Risk of  labor     Encounter for supervision of high risk pregnancy in second trimester, antepartum     PROM (premature rupture of membranes)      (normal spontaneous vaginal delivery)     Heartburn during pregnancy, antepartum     Bipolar disorder in full remission, most recent episode unspecified type (H)     Maternal iron deficiency anemia complicating pregnancy in third trimester     Labor and delivery, indication for care       PLAN:  ===========  - Admit - see IP orders.   - Pain medication options of nitrous oxide, fentanyl IV and epidural anesthesia reviewed with pt. Pt is interested in epidural. Anesthesia notified.   - Ambulation, hydration, position  changes, birthing ball and tub options to facilitate labor reviewed with pt .  - [facility] residents Marily Vega and Collin Alejandro paged and aware pt is in labor.   - Anticipate   - Reevaluate in 2-4 hours/sooner prn   WESLY Boss CNM

## 2022-12-13 NOTE — PROGRESS NOTES
[facility]  Inpatient Primary Care Note    Blaire Owusu MRN# [id number]   Age: 27 year old YOB: [birth year] 11:34 PM    Patient admitted: 12/12/2022  9:33 PM  Reason for admission: Encounter for triage in pregnant patient [Z36.89]  Labor and delivery, indication for care [O75.9]  Primary inpatient team: [facility]    Home clinic: [facility]  Primary care provider: Marily Vega DO         Primary provider communication:     1. I have reviewed the patient s admission History & Physical: Yes Discussed in person with CNM on call and Dr. Alejandro, patient's other OB provider.  2. I have reviewed associated daily notes: Yes    I appreciate the contributions of the inpatient team to the care of my patient.  If there are questions regarding this patient, the best way to contact me is pager.    Marily Vega, DO   she/her  PGY-3  Family Medicine

## 2022-12-13 NOTE — PROGRESS NOTES
[facility]  Intrapartum Progress Note  2022 11:35 PM  Date of service: 2022    SUBJECTIVE:  Blaire RUEDA is a 27F  at 38w5d (LMP 3/16/22; LIEN 2022) who was admitted to the [facility] on 2022 at 10:23PM in early labor. She is currently at 4/70/-1, which is progression from her triage episode on 2022 where she was 1 and closed.    She is currently doing well - just received her epidural d/t pain and nausea following her cervical check. She states she initially felt contractions Q7min until she came in, and now it's closer to Q3-4min. Feeling baby move.     OBJECTIVE:   Patient Vitals for the past 8 hrs:   BP Resp SpO2   223 -- -- 97 %   22 2202 126/73 18 --     Gen: no apparent distress, resting comfortably  Abd: Gravid.  EFW: 1,126g at 28w1d - by estimated extrapolation (200g/week) - ~3,126g    Sterile Vaginal Exam:  Was performed earlier by our CNM colleague  Membranes: intact   Cervix: 4/70%/-1    Presentation: Cephalic by initial bedside ultrasound on admission      Electronic Fetal Monitoring: at 12:00AM  O: Baseline rate normal (~130)  Variability moderate  Accelerations present  Decelerations not present    Assessment: Category I EFM interpretation suggests absence of concern for fetal metabolic acidemia at this time due to moderate variability and accelerations present    Uterine Activity normal.      Strip reviewed remotely via [employer]     ASSESSMENT and PLAN:  Blaire Owusu is a 27 year old  at 38w5d in early labor. With her current cervical assessment findings, she is in latent labor, though near active based on her effacement and dilation.     Pregnancy has been complicated by:  - Bipolar Disorder, MDD, and MILES  - Psychiatric medication use (currently on Celexa, tapered off Quetiapine)  - Anemia affect third trimester (now s/p IV iron x3)  - GERD/Nausea/Vomit  - Breech Presentation at 35w (now cephalic  spontaneously)   - High Lying placenta (visualized on POCUS)    With progression of cervical change from 2022 and more consistent contractions, patient is now in labor - considered early labor based on her LIEN (2022). FHT Cat 1. She is GBS negative. Just received epidural at 11:50PM. Based on her current cervical assessment, she has a Bishops of 9 - suggesting a favorable likelihood of  w/ potentially no need for augmentation.     At this time, patient is INTACT with [facility]. I discussed with her that we will re-assess around 3-4am to check in. If at that time she is still unruptured, we can consider artifical rupture considering her GBS negative status.       Patient Active Problem List   Diagnosis     ADHD (attention deficit hyperactivity disorder)     Deliberate self-cutting     MILES (generalized anxiety disorder)     Major depression in remission (H)     Menorrhagia     Oppositional defiant disorder     Parent/child conflict     Risk of  labor     Encounter for supervision of high risk pregnancy in second trimester, antepartum     PROM (premature rupture of membranes)      (normal spontaneous vaginal delivery)     Heartburn during pregnancy, antepartum     Bipolar disorder in full remission, most recent episode unspecified type (H)     Maternal iron deficiency anemia complicating pregnancy in third trimester     Labor and delivery, indication for care       # Pain Assessment:  Current Pain Score 2022   Patient currently in pain? denies   Pain score (0-10) -   Pain descriptors -   - Blaire is experiencing pain due to contractions and labor. Pain management was discussed with Blaire and her significant other and the plan was created in a collaborative fashion.  Blaire's response to the current recommendations: engaged  - Please see the plan for pain management as documented above      Collin Alejandro DO

## 2022-12-13 NOTE — PLAN OF CARE
VSS. Fundus midline, firm, and U/U. Lochia scant. Pain adequately managed with tylenol and ibuprofen. Voiding without difficulty. Pumping independently with good results. Visiting baby in NICU throughout the day. Continue with plan of care.

## 2022-12-13 NOTE — PROGRESS NOTES
Patient arrived to [facility] unit via wheelchair at 0700,with belongings, accompanied by spouse/ significant other, with infant in NICU. Received report from LEANNA Melgar and checked bands. Unit and room orientation started. Call light within arms reach; no concerns present at this time. Report passed on to LEANNA Moe. Continue with plan of care.

## 2022-12-13 NOTE — PLAN OF CARE
Patient reporting increased pain and discomfort.  Requesting an epidural for pain relief. [id number] MDA called and in room at 2305. Patient and procedure correctly identified/ verified with MDA. Consent signed. 1000 mL fluid bolus given prior to epidural placement.  Epidural placed by MDA without complication. Test dose/ bolus given by MDA, patient tolerated well. Patient reports pain relief after procedure.

## 2022-12-13 NOTE — TELEPHONE ENCOUNTER
Please call patient to schedule 2 week and 6 week postpartum visits:    Date of Delivery: 12/13/22  Anticipated Date of Discharge: 12/14/22    Please use dot phrase: [id number]    Please document all calls. Close encounter after appointment is scheduled or after last attempt has been made    Michaela Hernadez RN

## 2022-12-13 NOTE — ANESTHESIA PREPROCEDURE EVALUATION
Anesthesia Pre-Procedure Evaluation    Patient: Blaire Owusu   MRN: [id number] : 1995        Procedure :           Past Medical History:   Diagnosis Date     Anxiety      Depression      Depressive disorder      Eating disorder Bulemia     Maternal iron deficiency anemia complicating pregnancy in third trimester 11/3/2022     Mutilations, self     Cutting      Past Surgical History:   Procedure Laterality Date     DENTAL SURGERY Bilateral 2012    [place] teeth removal, all 4      Allergies   Allergen Reactions     Lactose GI Disturbance      Social History     Tobacco Use     Smoking status: Former     Packs/day: 0.10     Years: 1.00     Pack years: 0.10     Types: Cigarettes     Quit date: 2018     Years since quittin.0     Smokeless tobacco: Never   Substance Use Topics     Alcohol use: Yes     Comment: bottle of Vodka/month      Wt Readings from Last 1 Encounters:   22 82.8 kg (182 lb 9.6 oz)        Anesthesia Evaluation   Pt has had prior anesthetic. Type: OB Labor Epidural.        ROS/MED HX  ENT/Pulmonary:       Neurologic:       Cardiovascular:    (-) PIH   METS/Exercise Tolerance:     Hematologic:       Musculoskeletal:   (+) low back pain (SI Joint Pain),     GI/Hepatic:     (+) GERD,     Renal/Genitourinary:       Endo:    (-) obesity   Psychiatric/Substance Use:     (+) psychiatric history anxiety, bipolar and depression     Infectious Disease:       Malignancy:       Other:     (-) previous        Physical Exam    Airway        Mallampati: II   TM distance: > 3 FB   Neck ROM: full   Mouth opening: > 3 cm    Respiratory Devices and Support         Dental           Cardiovascular   cardiovascular exam normal          Pulmonary   pulmonary exam normal            Other findings: Breech Presentation at 33w  Cephalic Presentation at 35w    OUTSIDE LABS:  CBC:   Lab Results   Component Value Date    WBC 14.3 (H) 2022    WBC 26.9 (H) 2020    HGB 12.3 2022     HGB 11.0 (L) 11/03/2022    HCT 37.5 05/09/2022    HCT 45.5 08/26/2020     05/09/2022     (H) 08/26/2020     BMP:   Lab Results   Component Value Date     08/26/2020     04/01/2011    POTASSIUM 3.8 08/26/2020    POTASSIUM 3.7 04/01/2011    CHLORIDE 101 08/26/2020    CHLORIDE 107 04/01/2011    CO2 21 08/26/2020    CO2 26 04/01/2011    BUN 16 08/26/2020    BUN 11 04/01/2011    CR 1.03 08/26/2020    CR 0.58 04/01/2011     (H) 08/26/2020    GLC 80 04/01/2011     COAGS: No results found for: PTT, INR, FIBR  POC:   Lab Results   Component Value Date    HCG Positive (A) 04/19/2022     HEPATIC:   Lab Results   Component Value Date    ALBUMIN 3.8 08/26/2020    PROTTOTAL 7.8 08/26/2020    ALT 51 (H) 08/26/2020    AST 96 (H) 08/26/2020    ALKPHOS 166 (H) 08/26/2020    BILITOTAL 0.8 08/26/2020     OTHER:   Lab Results   Component Value Date    MUKUND 8.0 (L) 08/26/2020    TSH 1.61 04/01/2011       Anesthesia Plan    ASA Status:  2      Anesthesia Type: Epidural.              Consents    Anesthesia Plan(s) and associated risks, benefits, and realistic alternatives discussed. Questions answered and patient/representative(s) expressed understanding.    - Discussed:     - Discussed with:  Patient         Postoperative Care            Comments:                Vaughn Brito MD

## 2022-12-13 NOTE — ANESTHESIA PROCEDURE NOTES
Epidural catheter Procedure Note    Pre-Procedure   Staff -        Anesthesiologist:  Mary Burger MD       Resident/Fellow: Vaughn Brito MD       Performed By: resident       Location: OB       Procedure Start/Stop Times: 12/12/2022 11:22 PM and 12/12/2022 11:43 PM       Pre-Anesthestic Checklist: patient identified, IV checked, risks and benefits discussed, informed consent, monitors and equipment checked, pre-op evaluation, at physician/surgeon's request and post-op pain management  Timeout:       Correct Patient: Yes        Correct Procedure: Yes        Correct Site: Yes        Correct Position: Yes   Procedure Documentation  Procedure: epidural catheter       Patient Position: sitting       Patient Prep/Sterile Barriers: sterile gloves, mask, patient draped       Skin prep: Chloraprep       Local skin infiltrated with mL of 1% lidocaine.        Insertion Site: L3-4. (midline approach).       Technique: LORT saline        BAM at 4 cm.       Needle Type: [employer] needle       Needle Gauge: 17.        Needle Length (Inches): 5        Catheter: 19 G.          Catheter threaded easily.         5 cm epidural space.         Threaded 9 cm at skin.         # of attempts: 1 and  # of redirects:  0    Assessment/Narrative         Paresthesias: No.       Test dose of mL lidocaine 1.5% w/ 1:200,000 epinephrine at 23:31 CST.         Test dose negative, 3 minutes after injection, for signs of intravascular, subdural, or intrathecal injection.       Insertion/Infusion Method: LORT saline       Aspiration negative for Heme or CSF via Epidural Catheter.       Sensory Level Left: T7.       Sensory Level Right: T10.    Medication(s) Administered   0.125% bupivacaine 5 mL + fentanyl 20 mcg + NS 5 mL (Epidural) (Mixture components: bupivacaine HCl (PF) 0.25 % Soln, 5 mL; fentaNYL (PF) 100 MCG/2ML Soln, 20 mcg; sodium chloride 0.9 % Soln, 5 mL) - EPIDURAL   10 mL - 12/12/2022 11:35:00 PM  Medication Administration Time:  "12/12/2022 11:22 PM      FOR [facility] ONLY:   Pain Team Contact information: please page the Pain Team Via [email or web address]. Search \"Pain\". During daytime hours, please page the attending first. At night please page the resident first.    "

## 2022-12-14 ENCOUNTER — TELEPHONE (OUTPATIENT)
Dept: FAMILY MEDICINE | Facility: CLINIC | Age: 27
End: 2022-12-14

## 2022-12-14 VITALS
HEART RATE: 63 BPM | TEMPERATURE: 98.4 F | RESPIRATION RATE: 16 BRPM | OXYGEN SATURATION: 91 % | SYSTOLIC BLOOD PRESSURE: 110 MMHG | DIASTOLIC BLOOD PRESSURE: 63 MMHG

## 2022-12-14 DIAGNOSIS — Z30.8 ENCOUNTER FOR OTHER CONTRACEPTIVE MANAGEMENT: Primary | ICD-10-CM

## 2022-12-14 LAB
CANNABINOIDS UR CFM-MCNC: 174 NG/ML
CARBOXYTHC/CREAT UR: 272 NG/MG CREAT
HGB BLD-MCNC: 11.2 G/DL (ref 11.7–15.7)
HUMAN PAPILLOMA VIRUS 16 DNA: NEGATIVE
HUMAN PAPILLOMA VIRUS 18 DNA: NEGATIVE
HUMAN PAPILLOMA VIRUS FINAL DIAGNOSIS: NORMAL
HUMAN PAPILLOMA VIRUS OTHER HR: NEGATIVE

## 2022-12-14 PROCEDURE — 85018 HEMOGLOBIN: CPT | Performed by: REGISTERED NURSE

## 2022-12-14 PROCEDURE — 36415 COLL VENOUS BLD VENIPUNCTURE: CPT | Performed by: REGISTERED NURSE

## 2022-12-14 PROCEDURE — 99238 HOSP IP/OBS DSCHRG MGMT 30/<: CPT | Mod: GC | Performed by: STUDENT IN AN ORGANIZED HEALTH CARE EDUCATION/TRAINING PROGRAM

## 2022-12-14 ASSESSMENT — ACTIVITIES OF DAILY LIVING (ADL)
ADLS_ACUITY_SCORE: 20

## 2022-12-14 NOTE — PROVIDER NOTIFICATION
12/14/22 1228   Provider Notification   Provider Name/Title Social Work Pager   Method of Notification Electronic Page   Request Evaluate in Person   Notification Reason Other     Pt says her ride will be here in about 20 minutes.     Call back:  will call Pt to complete initial assessment.

## 2022-12-14 NOTE — DISCHARGE SUMMARY
[facility]  Post-partum Discharge Summary    Blaire Owusu MRN# [id number]   Age: 27 year old YOB: 1995     Date of Admission:  2022  Date of Discharge::  2022  Admitting Physician:  WESLY Badillo CNM  Discharge Physician:  Dragan Ackerman DO     Home clinic: [facility]            Admission Diagnoses:   Encounter for triage in pregnant patient [Z36.89]  Labor and delivery, indication for care [O75.9]          Discharge Diagnosis:     Normal spontaneous vaginal delivery  Intrauterine pregnancy at 38w6d weeks gestation  Patient Active Problem List   Diagnosis    ADHD (attention deficit hyperactivity disorder)    Deliberate self-cutting    MILES (generalized anxiety disorder)    Major depression in remission (H)    Menorrhagia    Oppositional defiant disorder    Parent/child conflict    Risk of  labor    Encounter for supervision of high risk pregnancy in second trimester, antepartum    PROM (premature rupture of membranes)     (normal spontaneous vaginal delivery)    Heartburn during pregnancy, antepartum    Bipolar disorder in full remission, most recent episode unspecified type (H)    Maternal iron deficiency anemia complicating pregnancy in third trimester    Labor and delivery, indication for care             Procedures:     Procedure(s): No additional procedures performed       No procedures performed during this admission           Medications Prior to Admission:     Medications Prior to Admission   Medication Sig Dispense Refill Last Dose    citalopram (CELEXA) 20 MG tablet Take 20 mg by mouth daily.       famotidine (PEPCID) 40 MG tablet Take 1 tablet (40 mg) by mouth 2 times daily for 60 days 120 tablet 1     ondansetron (ZOFRAN ODT) 4 MG ODT tab Take 1 tablet (4 mg) by mouth every 8 hours as needed for nausea 30 tablet 1     Prenatal Vit-Fe Fumarate-FA (PRENATAL MULTIVITAMIN W/IRON) 27-0.8 MG tablet Take 1 tablet by mouth daily                 Discharge Medications:     Current Discharge Medication List        CONTINUE these medications which have NOT CHANGED    Details   citalopram (CELEXA) 20 MG tablet Take 20 mg by mouth daily.      famotidine (PEPCID) 40 MG tablet Take 1 tablet (40 mg) by mouth 2 times daily for 60 days  Qty: 120 tablet, Refills: 1    Associated Diagnoses: Heartburn during pregnancy, antepartum      ondansetron (ZOFRAN ODT) 4 MG ODT tab Take 1 tablet (4 mg) by mouth every 8 hours as needed for nausea  Qty: 30 tablet, Refills: 1    Associated Diagnoses: Heartburn during pregnancy, antepartum      Prenatal Vit-Fe Fumarate-FA (PRENATAL MULTIVITAMIN W/IRON) 27-0.8 MG tablet Take 1 tablet by mouth daily                   Consultations:   No consultations were requested during this admission          Brief History of Labor:     Labor:  Blaire is a 27 year old female who presented to the [facility] at [facility] at 2200 on 2022 . Blaire reported contractions for the past 8 hours.  She was accompanied by LUCIAN Le, who is supportive. Following her arrival to the hospital, an assessment revealed Blaire to be in early labor with a cervical exam of 4/70/0. Blaire labored in multiple positions. She received an epidural 2330 and continued to labor on her own. At 0300 she felt increased pressure and SVE in bed revealed complete dilation at 0308. Membranes were bulging and AROM done for small amount clear fluid.        Birth:  Progressed to complete and pushed effectively with CNM coaching. FHR with variable decels with pushing effort, moderate variability throughout. NICU called to be present at delivery due to maternal selective serotonin reuptake inhibitor use during pregnancy. Head delivered ROP and restituted to ROT . No nuchal cord. Shoulders easily delivered under maternal effort.  Live female  delivered at 0408 over an intact perineum under epidural anesthesia. Infant directly to maternal abdomen, skin to  skin. Delayed cord clamping for 1 minutes then clamped x2 and cut by CNM. Infant then brought to warmer for assessment by NNP, see provider note. IV pitocin infusing immediately after baby with pt's consent. Cord blood obtained for typing. Cord segment for gases sent. Intact placenta spontaneously delivered via Greenberg at 0412.  3 vessel cord. Fundus firm @ U. Vagina, perineum, and rectum inspected, found to be intact.  Bilateral labial lacerations noted, 1% infused prior and right laceration repaired with 4-0 suture using one interrupted stitch. Hemostasis noted. Mother stable. Infant admitted to NICU for additional respiratory support, see provider note.        Hospital Course (HPI):   The patient's hospital course was unremarkable.  On discharge, her pain was well controlled. Vaginal bleeding is decreased.  Voiding without difficulty.  Ambulating well and tolerating a normal diet.  No fever. Both breast and formula.  Infant is in the NICU.  She was discharged on post-partum day #1. Contraception plan: IUD, not paraguard. Post partum depression education was provided.         D/C Physical Exam:     Vitals:    12/13/22 1139 12/13/22 1545 12/13/22 2332 12/14/22 0558   BP: 133/60 124/75 113/68 110/63   BP Location: Left arm Left arm Left arm Left arm   Patient Position:  Semi-Denise's Semi-Denise's Semi-Denise's   Cuff Size:  Adult Regular Adult Regular Adult Regular   Pulse: 72 75 67 63   Resp: 16 16 16 16   Temp: 97.6  F (36.4  C) 98.3  F (36.8  C) 98  F (36.7  C) 98.4  F (36.9  C)   TempSrc: Oral Oral Oral Oral   SpO2:           GENERAL:         healthy, alert and no distress  RESPIRATORY:   No increased work of breathing, good air exchange, clear to auscultation bilaterally, no crackles or wheezing   CARDIOVASCULAR:   Normal apical impulse, regular rate and rhythm, normal S1 and S2, no S3 or S4, and no murmur noted   ABDOMEN:   No scars, normal bowel sounds, soft, non-distended, non-tender, no masses palpated, no  hepatosplenomegally  Fundal height at level of umbilicus + 1 cm.  Firm and non-tender.   EXTREMITIES:          No edema    Post-partum hemoglobin:   Hemoglobin   Date Value Ref Range Status   2022 11.2 (L) 11.7 - 15.7 g/dL Final   2020 13.9 11.7 - 15.7 g/dL Final           Discharge Instructions and Follow-Up:     Discharge diet: Regular   Discharge activity: Activity as tolerated   Discharge follow-up: Follow up with primary care provider in 14 days   Wound care: NA              Discharge Disposition:     Discharged to home        #  Complicated by iron deficiency anemia and bipolar disorder. Hgb on discharge 11.2 down from 12.8, an appropriate drop. Baby in NICU improving. Patient's previous child also had a 5-day NICU stay and was more serious than this stay seems to be at this point. Patient and partner ready to go home, and were packed when we entered the room this morning. Reports that they have everything they need at home for baby and have a plan to follow up with doctor in 2 weeks at [facility]. Follows with Dr. Vega and Dr. Alejandro. Pregnancy spacing recommendations and instructions regarding single stitch repair given.       #Bipolar disorder in full remission  Recommend restarting outpatient medications without change and follow up with clinic on 2-week visit.    #Contraception Plan  OCP's, progestin only 2/2 patient's desire to breastfeed. Will defer to outpatient follow up in 2 weeks. Had a paraguard previously and would prefer not to have another 2/2 bleeding. Does want another IUD. Will forward note to [facility] cl[facility].        Mother desires IUD or Nexplanon, [place] [facility] clinic referral placed. Please Schedule  Yes     Sosa Monroy, DO

## 2022-12-14 NOTE — PLAN OF CARE
Postpartum VSS. Assessments WDL. Patient denies pain, did not want writer to wake her up during the night to administer pain meds as patient stated she hardly slept the night prior. Fundus firm, midline and below the U. Scant amounts of lochia with no odor or clots present. Infant is in NICU, mother is pumping independently and bringing to NICU. Call light within reach. Continue with plan of care.    Kaleigh Gomez RN

## 2022-12-14 NOTE — PROVIDER NOTIFICATION
12/14/22 1115   Provider Notification   Provider Name/Title Social Work Pager   Method of Notification Electronic Page   Request Evaluate in Person   Notification Reason Other  (Paged to notify that Pt is discharging today.)

## 2022-12-14 NOTE — CONSULTS
[facility]  MATERNAL CHILD HEALTH   INITIAL PSYCHOSOCIAL ASSESSMENT     DATA:     Presenting Information: Mom, Blaire, is a 27 year old  who delivered a girl , Zita (Pronounced: R-eye-laura), on 22 at Gestational Age: 38 weeks 6 days in the setting of  . SW was consulted for Positive Tox Screen and NICU Admission.     Living Situation: Parents, Blaire and Rey, are , who live together in [place], [place], along with their 3 year old son, Zafar.    Social Support: Blaire and Rey's immediate families live locally. They are very supportive and involved with their grandson, Zafar. Maternal Grandfather will be primary childcare for inessa Ahumada, once her Mom returns     Employment: Blaire works at [employer] in [place], [place]. She is an . She will go back to work in 2023. Rey works at the [employer] in the G[employer] Department cleaning the dormitories. He carries the health insurance. He has 6 weeks of paid paternity leave. He plans on taking it.      Insurance: Zita will be added to Dad's health insurance which is through [employer]. He will call to have her added.     Source of Financial Support: parental employment and paid time off.     Mental Health History:     Diagnoses: Hx of Bipolar Dx; Generalized Anxiety Disorder, Depression, ADHD    Medications: Celexa    Therapy: [facility] - Blaire has a psychiatrist that manages her medications. She stopped therapy when she found out she was pregnant this time around. She will re-connect with her if she feels it is warranted.     History of Postpartum Mood Disorders: Blaire denied any history of PMAD's with her son, Zafar. She verbalized understanding of what s/s of PMAD's/ She acknowleded the differences between baby blues and PMAD's.  did provide education about Post Partum Support [place].     Chemical Health History: Yes.     Substances: Marijuana     Last  Use/Frequency: Last use was late last week.     Toxicology Screens in Pregnancy: Unknown.     Toxicology Screen at Delivery: Preliminary tox screen was positive for THC.     Legal/Child Protection Involvement: No history of CPS involvement.     INTERVENTION:       Chart review    Collaboration with team.     Discussion with Mom that CPS report will be made d/t positive tox screen.     Conducted Psychosocial Assessment    Introduction to Maternal Child Health SW role and scope of practice    Validated emotions and provided supportive listening    Provided psychoeducation on  mood and anxiety disorders    SW described process for birth certificate, social security card and insurance process.     Provided resources and referrals    parking pass and Info for [facility]     ASSESSMENT:     Coping: Blaire and Rye were very communicative and open about their supports, emotions and feelings about Zita being in NICU. Blaire shared that Zafar was in the NICU for about 5 days after his birth. She shared she has an idea of what to expect. Blaire was open and honest about her THC use and shared she used it to help with her sleep and severe nausea through this pregnancy. She and spouse communicated appropriately with one another and appear to have a strong relationship.      Assessment of parental risk for PMAD: Moderate to high risk, given mental health hx, though she reports a strong familial and community support system.     Risk Factors: maternal mental health history or concerns     Resiliency Factors & Strengths: local family, experienced parents, strong social support, stable housing, reliable transportation, connected with mental health support, able and willing to ask for help/accept help, able and willing to ask advocate for self/baby, demonstrated commitment to being present and engaged in baby's cares, demonstrated ability to integrate new information  and actively seeking resources     PLAN:     SW will continue to  follow for supportive intervention. Primary SW ([place], [facility]) will follow-up and fax final tox screen to [facility] (F: [phone])    GEORGETTE He, [facility]  Clinical    Pediatric Hematology Oncology   [facility]   Monday-Thursday   Phone: [phone]  Pager: [phone]    NO LETTER

## 2022-12-14 NOTE — PLAN OF CARE
Data: Vital signs stable, postpartum assessments within normal limits.   Eating and drinking without nausea or vomiting.  Up ad rahul, and voiding without difficulty.   Pumping for baby Zita in NICU.  Pain managed with tylenol and ibuprofen. Pt states she is comfortable.  Perineum appears to be healing well, no s/s infection.  Discharge outcomes on care plan met.   Action: Review of care plan, teaching, and discharge instructions done.Breast pump provided to take home. Pt has no discharge meds, all are OTC.   Response: Patient states understanding and comfort with her discharge instructions and plan of care. All questions addressed. She will discharge home with her .

## 2022-12-14 NOTE — PLAN OF CARE
Goal Outcome Evaluation: [id number]      Plan of Care Reviewed With: patient      VSS and postpartum assessment WDL. She is up ad rahul, voiding, pain managed with medication as needed (see MAR). Perineum appears to be healing well with no s/s infection. Uterus firm and midline. Scant lochia rubra. No breast or nipple pain; pumping independently. Patient is passing flatus. Support person present at bedside; patient goes to visit baby often in NICU. Education provided on plan of care, self-care techniques, and discharge tasks. Continue with plan of care.

## 2022-12-21 ENCOUNTER — TELEPHONE (OUTPATIENT)
Dept: FAMILY MEDICINE | Facility: CLINIC | Age: 27
End: 2022-12-21

## 2022-12-21 NOTE — TELEPHONE ENCOUNTER
[facility] Mother & Baby Post Delivery Process  Follow up Phone Call - Mom  2022     Called and spoke with patient     Blaire Owusu, a 27 year old female    Did she receive a home health nurse visit? No     Topics discussed with patient  Breastfeeding, hand-expression, pumping: breastfeeding and pumping while baby in NICU going well  Bleeding: slowing down  Pain: deies  Headache: denies  Swelling: denies  Postpartum depression/fatigue:   I have blamed myself unnecessarily when things went wrong  No   I have been anxious or worried for no apparent reason No               I have felt scared or panicky for no apparent reason No   Bladder/Bowel concerns: has had a BM since delivery and went ok. Would like stool softener on ramirez      Follow Up  2-3 day  visit has been scheduled? baby still in NICU but will be coming to [facility]   2 week mom/baby appointment has been scheduled? Yes   6 week postpartum mom visit has been scheduled? No     Michaela Hernadez RN

## 2023-01-03 ENCOUNTER — MEDICAL CORRESPONDENCE (OUTPATIENT)
Dept: HEALTH INFORMATION MANAGEMENT | Facility: CLINIC | Age: 28
End: 2023-01-03

## 2023-01-31 ENCOUNTER — OFFICE VISIT (OUTPATIENT)
Dept: FAMILY MEDICINE | Facility: CLINIC | Age: 28
End: 2023-01-31
Payer: COMMERCIAL

## 2023-01-31 VITALS
SYSTOLIC BLOOD PRESSURE: 97 MMHG | HEART RATE: 92 BPM | HEIGHT: 70 IN | WEIGHT: 157.2 LBS | TEMPERATURE: 98.4 F | BODY MASS INDEX: 22.5 KG/M2 | DIASTOLIC BLOOD PRESSURE: 58 MMHG | OXYGEN SATURATION: 97 %

## 2023-01-31 DIAGNOSIS — Z30.430 ENCOUNTER FOR INSERTION OF INTRAUTERINE CONTRACEPTIVE DEVICE: Primary | ICD-10-CM

## 2023-01-31 LAB — HCG UR QL: NEGATIVE

## 2023-01-31 PROCEDURE — 99207 PR DROP WITH A PROCEDURE: CPT | Performed by: STUDENT IN AN ORGANIZED HEALTH CARE EDUCATION/TRAINING PROGRAM

## 2023-01-31 PROCEDURE — 58300 INSERT INTRAUTERINE DEVICE: CPT | Mod: GC | Performed by: STUDENT IN AN ORGANIZED HEALTH CARE EDUCATION/TRAINING PROGRAM

## 2023-01-31 PROCEDURE — 81025 URINE PREGNANCY TEST: CPT | Performed by: STUDENT IN AN ORGANIZED HEALTH CARE EDUCATION/TRAINING PROGRAM

## 2023-01-31 NOTE — PATIENT INSTRUCTIONS
IUD AFTERCARE INSTRUCTIONS     Uterine cramping is common after IUD placement. You can help relieve the discomfort with heating pads, Tylenol (acetaminophen), Aspirin or Advil (ibuprofen). If your cramping becomes very painful, please call the clinic.     Irregular bleeding and spotting is normal for the first few months after the IUD is placed. In some cases, women may experience irregular bleeding or spotting for up to six months after the IUD is placed. This bleeding can be annoying at first but usually will become lighter with the Mirena IUD quickly. Call the clinic if your bleeding is excessive and not getting better.     Your period will likely be shorter and lighter with a Mirena IUD. Approximately 40% of people will stop having periods altogether with the Mirena IUD. Your period may be heavier and longer with the Paragard IUD.     IUDs do not protect against sexually transmitted infections including the AIDS virus (HIV), warts (HPV), gonorrhea, Chlamydia, and herpes. Condoms should be used to decrease the risk sexually transmitted infections. If you think that you have been exposed to a sexually transmitted infection, please call the clinic.     If you had the IUD placed for birth control, the Paragard IUD is effective immediately. The Mirena IUD is effective immediately if it was inserted within seven days after the start of your period. If you have Mirena inserted at any other time during your menstrual cycle, use another method of birth control, like condoms for at least 7 days.     It is possible for the IUD to come out of the uterus. If it does slip out of place, it is most likely to happen in the first few months after being put in. To make sure your IUD is in place, you can feel for the IUD strings between periods. To check for strings, wash your hands. Then, sit or squat down. Place one finger into your vagina until you feel your cervix. It will feel hard and rubbery, like the end of your nose. The  string ends should be coming through your cervix. Do not pull on the strings. If the strings feel much longer than before, if you feel the hard plastic part of the IUD, or if you cannot feel the strings at all, the IUD may have moved out of place. Please call the clinic and consider using a back up form of birth control until you are seen.     Keep your follow-up appointment for 4-6 weeks after the IUD has been placed.     Pregnancy is unlikely after IUD placement, but can happen. If you have early pregnancy symptoms like nausea and vomiting, breast tenderness, frequent urination or abdominal pain, you can take a pregnancy test. Please call the clinic if you have any concerns or if your pregnancy test is positive.     The IUD should only be removed by a healthcare provider.   The Mirena IUD should be removed and/or replaced after 8 years.   The Paragard IUD should be removed and/or replaced after 12 years.     Warning Signs   Call the clinic if any of the following occurs:      Severe abdominal pain or cramping      Unusual bleeding      Fever or chills      Foul smelling vaginal discharge      Painful intercourse      Positive pregnancy test.         Scheduling:  If you have any concerns about today's visit or wish to schedule another appointment please call our office during normal business hours [phone] (8-5:00 M-F)  If a referral was made to a [facility] and you don't get a call from central scheduling please call [phone].  If a Mammogram was ordered for you at [facility] call [phone] to schedule or change your appointment.  If you had an XRay/CT/Ultrasound/MRI ordered the number is [phone] to schedule or change your radiology appointment.

## 2023-01-31 NOTE — PROGRESS NOTES
[facility]  IUD Insertion Note    Blaire Owusu is a patient of Marily Valdovinos here for an IUD - Mirena insertion   Indication: Post Partum Contraception    Counseling: Discussed potential side effects of Paraguard, including increased bleeding and cramping, as well as the Mirena, including unpredictable spotting and amenorrhea.  Patient aware to check for strings every month.    Consent: Affirmation of informed consent was signed and scanned into the medical record. Risks, benefits and alternatives were discussed including risk of infection, bleeding and small risk of uterine perforation.  Patient's questions were elicited and answered.   Procedure safety checklist was completed:  Yes  Time Out (Pause for the Cause) completed: Yes    Labs: UPT negative    Technique:   Patient was premedicated with ibuprofen:   Yes  Uterine position was confirmed by bimanual exam: Yes   Using a sterile speculum and equipment, the cervix was examined.     The cervix was cleansed with Betadine prep. A tenaculum was applied to the cervix with tension to straighten the cervical canal.  The uterus was sounded to 6.5cm.  A Mirena IUD was inserted in the usual fashion and strings trimmed 3cm below the cervix.  EBL: minimal  Complications: No  Tolerance: Pt tolerated procedure well and was in stable condition.  They were observed in the clinic for 15 min with post-procedure BP.     Follow up: Pt was instructed to call if fever, chills, heavy bleeding, severe cramping or foul smelling discharge. May take 600 mg ibuprofen QID prn for mild cramping.  They were advised to check the IUD strings monthly and recommended to return in 1 month for IUD string check.    Resident: Collin Alejandro DO  Faculty: Dr. Bryson present for and supervised this entire procedure.

## 2023-02-02 NOTE — PROGRESS NOTES
Preceptor Attestation:   I was present for and supervised the entire procedure. I have verified the content of the note, which accurately reflects my assessment of the patient and the plan of care.   Supervising Physician:  Malgorzata Bryson DO.

## 2023-04-07 ENCOUNTER — TRANSFERRED RECORDS (OUTPATIENT)
Dept: HEALTH INFORMATION MANAGEMENT | Facility: CLINIC | Age: 28
End: 2023-04-07

## 2023-06-02 ENCOUNTER — HEALTH MAINTENANCE LETTER (OUTPATIENT)
Age: 28
End: 2023-06-02

## 2023-08-10 ENCOUNTER — TRANSFERRED RECORDS (OUTPATIENT)
Dept: HEALTH INFORMATION MANAGEMENT | Facility: CLINIC | Age: 28
End: 2023-08-10
Payer: COMMERCIAL

## 2023-08-15 ENCOUNTER — TRANSFERRED RECORDS (OUTPATIENT)
Dept: HEALTH INFORMATION MANAGEMENT | Facility: CLINIC | Age: 28
End: 2023-08-15
Payer: COMMERCIAL

## 2023-08-25 ENCOUNTER — OFFICE VISIT (OUTPATIENT)
Dept: FAMILY MEDICINE | Facility: CLINIC | Age: 28
End: 2023-08-25
Payer: COMMERCIAL

## 2023-08-25 ENCOUNTER — HOSPITAL ENCOUNTER (EMERGENCY)
Facility: CLINIC | Age: 28
Discharge: HOME | End: 2023-08-25
Payer: COMMERCIAL

## 2023-08-25 VITALS
OXYGEN SATURATION: 99 % | BODY MASS INDEX: 19.93 KG/M2 | SYSTOLIC BLOOD PRESSURE: 106 MMHG | HEART RATE: 80 BPM | WEIGHT: 138.4 LBS | DIASTOLIC BLOOD PRESSURE: 67 MMHG

## 2023-08-25 DIAGNOSIS — F33.1 MODERATE EPISODE OF RECURRENT MAJOR DEPRESSIVE DISORDER (H): ICD-10-CM

## 2023-08-25 DIAGNOSIS — F31.62 BIPOLAR DISORDER, CURRENT EPISODE MIXED, MODERATE (H): ICD-10-CM

## 2023-08-25 DIAGNOSIS — R45.851 PASSIVE SUICIDAL IDEATIONS: Primary | ICD-10-CM

## 2023-08-25 PROBLEM — F31.70 BIPOLAR DISORDER IN FULL REMISSION, MOST RECENT EPISODE UNSPECIFIED TYPE (H): Status: RESOLVED | Noted: 2022-04-19 | Resolved: 2023-08-25

## 2023-08-25 PROCEDURE — 99215 OFFICE O/P EST HI 40 MIN: CPT | Mod: GC

## 2023-08-25 RX ORDER — CITALOPRAM HYDROBROMIDE 10 MG/1
10 TABLET ORAL DAILY
Qty: 30 TABLET | Refills: 2 | Status: SHIPPED | OUTPATIENT
Start: 2023-08-25 | End: 2023-08-29

## 2023-08-25 ASSESSMENT — PATIENT HEALTH QUESTIONNAIRE - PHQ9: SUM OF ALL RESPONSES TO PHQ QUESTIONS 1-9: 20

## 2023-08-25 ASSESSMENT — COLUMBIA-SUICIDE SEVERITY RATING SCALE - C-SSRS
5. IN THE PAST MONTH, HAVE YOU STARTED TO WORK OUT OR WORKED OUT THE DETAILS OF HOW TO KILL YOURSELF? DO YOU INTEND TO CARRY OUT THIS PLAN?: NO
6. HAVE YOU EVER DONE ANYTHING, STARTED TO DO ANYTHING, OR PREPARED TO DO ANYTHING TO END YOUR LIFE?: NO
5. IN THE PAST MONTH, HAVE YOU STARTED TO WORK OUT OR WORKED OUT THE DETAILS OF HOW TO KILL YOURSELF? DO YOU INTEND TO CARRY OUT THIS PLAN?: NO
3. IN THE PAST MONTH, HAVE YOU BEEN THINKING ABOUT HOW YOU MIGHT KILL YOURSELF?: NO
2. IN THE PAST MONTH, HAVE YOU ACTUALLY HAD ANY THOUGHTS OF KILLING YOURSELF?: YES
1. WITHIN THE PAST MONTH, HAVE YOU WISHED YOU WERE DEAD OR WISHED YOU COULD GO TO SLEEP AND NOT WAKE UP?: YES

## 2023-08-25 NOTE — PROGRESS NOTES
Assessment & Plan     Passive suicidal ideations  Concern for acute lula vs hypomania   The patient expresses passive suicidal ideations without signs of active self harm, denying suicidal ideation and homicidal ideation as well as intent to harm self or defined plan. She has been under a lot of stressors managing work life. Concern for patient's safety so would like to provide medical therapy today but patient has been following well with a psychiatrist. Obtained release of information forms but unable to reach psychiatrist today.     Discussed that patient should go to [facility] Unit at [facility]. Patient reached father to obtain a ride. Gave patient the address and called [facility] to update them on the patient's case.     Bipolar disorder, current episode mixed, moderate (H)  Currently with mixed manic and depressive symptoms, likely type 1 based on the severity of both. Seems to have insight to her auditory hallucinations but it's unclear if this is her conscious voice. Here voluntarily seeking help and support. Patient has recently increased her citalopram which is likely exacerbating her symptoms as monotherapy for bipolar disorder, but exam is reassuring against signs of psychosis. Patient directed to [facility] today to reestablish Seroquel or Olanzapine as Bipolar therapy.     Future med options pending [facility] evaluation and medication plan as discussed with Pharmacist:   - Patient recommended to taper Citalopram from 40 mg daily to 10 mg and establish atypical antipsychotic regimen  -Recommend Seroquel 300 mg daily vs Olanzapine 10-15 mg daily  -Consider lower dose of antipsychotic as above with restart of Divalproex Sodium 125 mg to reduce drowsiness with antipsychotics.   -Reestablish 20 mg Citalopram once antipsychotic regimen is in place.     citalopram (CELEXA) 10 MG tablet          Take 1 tablet (10 mg) by mouth daily, Disp-30 tablet, R-2, E-Prescribe       Lactating mother  Patient is currently  breastfeeding 9 month old and concerned with potential effects of antipsychotics on baby. Would like a second opinion as psychiatrist was apprehensive in providing previously regimen of Seroquel 300mg daily and lactation. Seroquel provided a beneficial treatment effect in the past. Looking at Up to Date and Medications and Mothers Milk 2014 guideline, Seroquel has a L2 Probably Compatible rating based on limited data, but studies report safety for baby with ongoing therapy. UP to Date shows Olanzapine is a safer antipsychotic regimen. Reasonable to transition to formula at this time which was recommended as patient has had significant weight loss. Offering breast pump so patient can pump while tapering off from breast feeding.   -Breast Pump  956}     Depression Screening Follow Up        8/25/2023    11:57 AM   PHQ   PHQ-9 Total Score 20   Q9: Thoughts of better off dead/self-harm past 2 weeks Several days         8/25/2023    11:57 AM   Last PHQ-9   1.  Little interest or pleasure in doing things 3   2.  Feeling down, depressed, or hopeless 3   3.  Trouble falling or staying asleep, or sleeping too much 3   4.  Feeling tired or having little energy 0   5.  Poor appetite or overeating 3   6.  Feeling bad about yourself 3   7.  Trouble concentrating 3   8.  Moving slowly or restless 1   Q9: Thoughts of better off dead/self-harm past 2 weeks 1   PHQ-9 Total Score 20   Difficulty at work, home, or with people Extremely dIfficult             8/25/2023    12:56 PM   C-SSRS (Brief [place])   Within the last month, have you wished you were dead or wished you could go to sleep and not wake up? Yes   Within the last month, have you had any actual thoughts of killing yourself? Yes   Within the last month, have you been thinking about how you might do this? No   Within the last month, have you had these thoughts and had some intention of acting on them? No   Within the last month, have you started to work out or worked out  the details of how to kill yourself with the intent to carry out this plan? No   Within the last month, have you ever done anything, started to do anything, or prepared to do anything to end your life? No       Follow Up        Follow Up Actions Taken  Crisis resource information provided in the After Visit Summary  Referred patient back to mental health provider  Patient to follow up with PCP.  Clinic staff to schedule appointment if able.    Addendum   Could not reach psychiatrist directly. Referring to Genie at [facility].     Discussed the following ways the patient can remain in a safe environment:  remove alcohol, remove drugs, secure medications: none, dispose of old medications , remove access to firearms: none, remove things I could use to hurt myself: none, and be around others    No follow-ups on file.    Ray Montesinos MD  [facility] ISAAC Rudd is a 28 year old, presenting for the following health issues:  Consult (Pt is here to seek help to see what she can do to manage her depression  with medication while breast feeding..)    HPI [id number]}    BPD diagnosis 2020. Placed on daily: Seroquel 300mg, citalopram 20 mg and Divalproex 125mg.   Worked very well, felt muted as a person. Going to bed at 6pm and waking up 6am: sleeping way too long. Full time student and working.   Was : walked out of the job recently.   Graduated in 2022, got pregnant in March and weaned off divalproex.     Want to see options are with pharmacotherapy.     Currently breastfeeding.     Postpartum:  and patient doing well,  noticing a shift in behavior, thinks patient has been in a severe depressive state for awhile.     Mood:   Highs and lows. Lasts 2 weeks to a month for each mood.   Depressed/suicidal:   -Can do   -No intimacy  -Lost interest in day to day pleasurable activity  -Shame, worthless or guilt  -Denies thinking of hurting self, no plan, passive thoughts of  day dreaming accident  -Has been hospitalized as teenager at [facility] ([facility])  -Denies Suicidal Ideation, Homicidal Ideation  -No access to weapons at home, no excessive medication at hand, lives in house    Manic  -Can't sleep, can't eat (dropped a lot of wait)  -Motivated  -Impulsive  -Will stay up all night taking care of plans, appointments  -Taking on multiple hobbies at one time  -Hypefixitating on one thing for a few things, obsess about it. [place] to random trips to  -Denies sensory and visual hallucinations. Unsure if she has audible hallucinations of her conscious, it wont stop, only bad things suggested, someone elses voice.     Citalopram was increased, 20 mg to 40mg (20 mg BID).    Following with psychiatrist and therapist.      Psychiatrist: [facility]. Located in [facility]. Dr. Byrd. Began seeing since Opioid overdose.   Saw them last Monday. Thinking about canceling care there as it's not meeting her care needs. Issues with breastfeeding and psychiatrist and not wanting to place on Seroquel. Not opposed to using formula for baby.     Review of Systems   Constitutional, HEENT, cardiovascular, pulmonary, gi and gu systems are negative, except as otherwise noted.      Objective    /67   Pulse 80   Wt 62.8 kg (138 lb 6.4 oz)   SpO2 99%   BMI 19.93 kg/m    Body mass index is 19.93 kg/m .  Physical Exam   GENERAL: alert, oriented and well appearing  Pulm: Non labored breathing  Skin: Well healed transverse scars on left forearm, no other lesions  PSYCH: mentation appears normal, affect normal/bright, anxious, judgement and insight intact, appearance well groomed, increased rate of speech,   Coherent, goal directed speech; non tangential  Not tracking to internal stimuli    ----- Service Performed and Documented by Resident or Fellow ------

## 2023-08-25 NOTE — PROGRESS NOTES
Evaluated patient this noon as PM covering preceptor. Agree with concern for hypomania vs lula in the context of pt self increasing selective serotonin reuptake inhibitor. Resident provider unable to reach pt's psychiatrist by phone to discuss med mgmt.     Agree with acute evaluation at [facility] for acute medication stabilization and close follow up with Psychiatrist or PCP. Discussed with pt who is agreeable. She does not feel safe to drive; low risk and no indication for EMS transport. Family member will drive her to [facility]. Resident provider called ahead to give report.

## 2023-08-25 NOTE — PROGRESS NOTES
Preceptor Attestation:    I discussed the patient with the resident and evaluated the patient in person. I have verified the content of the note, which accurately reflects my assessment of the patient and the plan of care.  Care coordination with psychiatry team and if urgency [facility] unit.    Supervising Physician:  Mert Parsons MD, MD.

## 2023-08-25 NOTE — PATIENT INSTRUCTIONS
"For acute mental health evaluation:     Go to the [facility] unit through the [facility] Emergency Department:     [facility]  [address] [place], [address]    We will call ahead to let them know you're coming     Your emotional health is important to us!  If you feel that you may hurt yourself or others, please seek help through the hospital ER, or 9-1-1.  You may also call [place] Crisis [facility], toll-free, at 1.453.837.5321 for immediate support.      The National Suicide Prevention Lifeline at  988 or 1-597.387.1295 can be reached 24/7.     Trans Lifeline can be reached at 708-037-4483.   For LGBT youth (ages 24 and younger) contemplating suicide, the [employer]erto Project Lifeline can be reached at 1-425.600.2989.  Filiberto Program: Text \"start\" to 128 967    If your current psychiatrist is not working well for you, please inform your psychiatrist if you want to transfer care and have them help transition this as well as by your PCP team.     "

## 2023-08-28 ENCOUNTER — HOSPITAL ENCOUNTER (EMERGENCY)
Facility: CLINIC | Age: 28
Discharge: HOME | End: 2023-08-29
Attending: EMERGENCY MEDICINE | Admitting: EMERGENCY MEDICINE
Payer: COMMERCIAL

## 2023-08-28 DIAGNOSIS — F31.62 BIPOLAR DISORDER, CURRENT EPISODE MIXED, MODERATE (H): ICD-10-CM

## 2023-08-28 DIAGNOSIS — R45.851 PASSIVE SUICIDAL IDEATIONS: ICD-10-CM

## 2023-08-28 PROBLEM — F41.1 GAD (GENERALIZED ANXIETY DISORDER): Status: ACTIVE | Noted: 2018-03-14

## 2023-08-28 PROCEDURE — 99285 EMERGENCY DEPT VISIT HI MDM: CPT | Mod: 25

## 2023-08-28 PROCEDURE — 250N000013 HC RX MED GY IP 250 OP 250 PS 637: Performed by: CLINICAL NURSE SPECIALIST

## 2023-08-28 PROCEDURE — 90791 PSYCH DIAGNOSTIC EVALUATION: CPT

## 2023-08-28 RX ORDER — QUETIAPINE 150 MG/1
150 TABLET, FILM COATED, EXTENDED RELEASE ORAL AT BEDTIME
Status: DISCONTINUED | OUTPATIENT
Start: 2023-08-28 | End: 2023-08-29 | Stop reason: HOSPADM

## 2023-08-28 RX ADMIN — QUETIAPINE FUMARATE 150 MG: 150 TABLET, EXTENDED RELEASE ORAL at 21:50

## 2023-08-28 ASSESSMENT — ACTIVITIES OF DAILY LIVING (ADL)
ADLS_ACUITY_SCORE: 35

## 2023-08-28 NOTE — CONSULTS
Diagnostic Evaluation Consultation  Crisis Assessment    Patient Name: Blaire Owusu  Age:  28 year old  Legal Sex: female  Gender Identity: female  Race:      White  Ethnicity: Not  or   Language: English      Patient was assessed: In person      Patient location: [facility] EMERGENCY DEPT                             [facility]    Referral Data and Chief Complaint  Blaire Owusu presents to the ED with family/friends. Patient is presenting to the ED for the following concerns: Suicidal ideation, Depression, Anxiety (recent lula).   Factors that make the mental health crisis life threatening or complex are:  Pt presents to the ED for worsening symptoms of bipolar, particularly rapid cycling of lula and depression. Pt is 9 months post-partum. She has also had four close friends or family members die over the past year, including a friend to alcoholism, a friend to opioid overdose, a friend's daughter to sepsis, and her father's friend to murder. Pt also quit her position as an  about a week ago. At present, pt is endorsing depressive symptoms of frequent crying, anhedonia, difficulty getting out of bed, decrease completion of hygiene activities, and lowered energy and motivation. Pt also endorses intrusive suicidal thoughts that are ego-dystonic. Pt reports that she has been going to bridges with vague suicidal thoughts, having intrusive images of driving off of cliffs, and was recently looking at a video of suicide by police. Pt reports no desire to follow-through with her suicidal thoughts, citing her family as an important protective factor against suicie. However, the intensity and frequency of her suicidal thoughts has been increasing over the past week. Pt tells this writer that she has also been experiencing increasingly frequent manic episodes wiht her last one about a week ago where she will have multiple new ideas that she does not see to completion  and spends excessive amounts of money. When asked about sleep or appetite, pt reports that she is sleeping 5 hours at most right now and does not eat unless someone prepares food for her. When asked about substance use, pt reports marijuana use three times daily and social alcohol use. She reports a history of opioid and cocaine use with an overdose in 2020. She denies HI or current NSSI. She shares that she has previously engaged in NSSI via cutting with her last episode taking place 2-3 years ago..      Informed Consent and Assessment Methods  Explained the crisis assessment process, including applicable information disclosures and limits to confidentiality, assessed understanding of the process, and obtained consent to proceed with the assessment.  Assessment methods included conducting a formal interview with patient, review of medical records, collaboration with medical staff, and obtaining relevant collateral information from family and community providers when available.  : done     Patient response to interventions: acceptance expressed, verbalizes understanding  Coping skills were attempted to reduce the crisis:  Pt has been accessing support from her family, particularly her  and parents.     History of the Crisis   Pt reports that she was previously on Seroquel but discontinued this during her pregnancy. She was placed on Celexa instead with varying dosages between 10-40 mg. She is on 10 mg at present. Pt attributes her current mental health crisis to the hormonal fluctuations of pregnancy and post-partum, changes in medication, and grieving the deaths of multiple friends. She does have a complex mental health history with multiple suicide attempts during her adolescence via hanging.    Brief Psychosocial History  Family:  , Children yes (Pt has a 4-year-old son and 9-month-old daughter.)  Support System:  , Parent(s)  Employment Status:  unemployed  Source of Income:   "salary/wages  Financial Environmental Concerns:  No concerns identified  Current Hobbies:  home improvement  Barriers in Personal Life:  emotional concerns    Significant Clinical History  Current Anxiety Symptoms:  excessive worry, anxious  Current Depression/Trauma:  sense of doom, difficulty concentrating, crying or feels like crying, hoplessness, helplessness, sadness, thoughts of death/suicide  Current Somatic Symptoms:  excessive worry, anxious  Current Psychosis/Thought Disturbance:     Current Eating Symptoms:  loss of appetite  Chemical Use History:  Alcohol: Social  Benzodiazepines: None  Opiates:  (history of opioid use, no opioid use at present)  Cocaine:  (history of cocaine use, no use at present)  Marijuana: Daily  Other Use: None   Past diagnosis:  Bipolar Disorder, Anxiety Disorder  Family history:  No known history of mental health or chemical health concerns  Past treatment:  Individual therapy, Inpatient Hospitalization, Psychiatric Medication Management  Details of most recent treatment:  Pt is currently followed by an outpatient therapist and psychiatrist. Per chart review, pt had multiple inpatient psychiatric hospitalizations during her adolescence.  Other relevant history:  No other relevant history.       Collateral Information  Is there collateral information: Yes     Collateral information name, relationship, phone number:  Rey Merino, , PH: [phone]    What happened today: Pt has been experiencing alternating manic and depressive episodes. At times, she is hopeless and inconsolable. At other times, she is \"so happy\", nothing bothers her, and she has scattered ideas, such as what color she wants to paint every room in the house. She is easily overwhelmed.     What is different about patient's functioning: Pt's  has been increasingly concerned about suicide over the past 3 months. When she is overwhelmed, she says that nobody cares about her, that nobody is helping " her, and that she should just end it. She has threatened to cut herself but not expressed a plan for suicide.     Concern about alcohol/drug use: At present, pt uses alcohol socially and marijuana.      What do you think the patient needs: Pt needs a robust plan for medication management.     Has patient made comments about wanting to kill themselves/others: yes (Pt has made vague suicidal statements without a plan. No concerns for HI.)    If d/c is recommended, can they take part in safety/aftercare planning:  yes    Additional collateral information:  Pt's 9-month-old daughter was in the NICU for 2 weeks. Pt has a history of ELIZABETH but is only using alcohol socially and marijuana at this time. Her psychiatric medications have been changed as a result of her pregnancy.     Risk Assessment  Ida Suicide Severity Rating Scale Full Clinical Version: 08/28/23  Suicidal Ideation  Q1 Wish to be Dead (Lifetime): Yes  Q2 Non-Specific Active Suicidal Thoughts (Lifetime): Yes  3. Active Suicidal Ideation with any Methods (Not Plan) Without Intent to Act (Lifetime): Yes  Q4 Active Suicidal Ideation with Some Intent to Act, Without Specific Plan (Lifetime): Yes  Q5 Active Suicidal Ideation with Specific Plan and Intent (Lifetime): Yes  Q6 Suicide Behavior (Lifetime): yes     Suicidal Behavior (Lifetime)  Actual Attempt (Lifetime): Yes  Total Number of Actual Attempts (Lifetime):  (at least 2 prior attempts per chart review)  Actual Attempt Description (Lifetime): Per chart review, pt attempted to hang herself during her adolescence.  Has subject engaged in non-suicidal self-injurious behavior? (Lifetime): Yes  Interrupted Attempts (Lifetime): No  Aborted or Self-Interrupted Attempt (Lifetime): No  Preparatory Acts or Behavior (Lifetime): No    Ida Suicide Severity Rating Scale Recent: 08/28/23  Suicidal Ideation (Recent)  Q1 Wished to be Dead (Past Month): yes  Q2 Suicidal Thoughts (Past Month): yes  Q3 Suicidal Thought  Method: yes  Q4 Suicidal Intent without Specific Plan: no  Q5 Suicide Intent with Specific Plan: no  Level of Risk per Screen: moderate risk  Intensity of Ideation (Recent)  Most Severe Ideation Rating (Past 1 Month): 3  Frequency (Past 1 Month): Daily or almost daily  Duration (Past 1 Month): Less than 1 hour/some of the time  Controllability (Past 1 Month): Can control thoughts with some difficulty  Deterrents (Past 1 Month): Deterrents probably stopped you  Reasons for Ideation (Past 1 Month): Completely to end or stop the pain (You couldn't go on living with the pain or how you were feeling)  Suicidal Behavior (Recent)  Actual Attempt (Past 3 Months): No  Total Number of Actual Attempts (Past 3 Months): 0  Has subject engaged in non-suicidal self-injurious behavior? (Past 3 Months): No  Interrupted Attempts (Past 3 Months): No  Total Number of Interrupted Attempts (Past 3 Months): 0  Aborted or Self-Interrupted Attempt (Past 3 Months): No  Total Number of Aborted or Self-Interrupted Attempts (Past 3 Months): 0  Preparatory Acts or Behavior (Past 3 Months): Yes (Pt reports that she has gone to bridges without intent to jump off of them.)  Total Number of Preparatory Acts (Past 3 Months):  (more than one visit to a bridge)    Environmental or Psychosocial Events: helplessness/hopelessness, unemployment/underemployment, neither working nor attending school  Protective Factors: Protective Factors: strong bond to family unit, community support, or employment, intact marriage or domestic partnership, responsibilities and duties to others, including pets and children, lives in a responsibly safe and stable environment, good treatment engagement, supportive ongoing medical and mental health care relationships, help seeking, sense of importance of health and wellness    Does the patient have thoughts of harming others? Feels Like Hurting Others: no  Previous Attempt to Hurt Others: no  Is the patient engaging in sexually  inappropriate behavior?: no    Is the patient engaging in sexually inappropriate behavior?  no        Mental Status Exam   Affect: Blunted  Appearance: Appropriate  Attention Span/Concentration: Attentive  Eye Contact: Engaged    Fund of Knowledge: Appropriate   Language /Speech Content: Fluent  Language /Speech Volume: Normal  Language /Speech Rate/Productions: Normal  Recent Memory: Intact  Remote Memory: Intact  Mood: Anxious, Sad, Depressed  Orientation to Person: Yes   Orientation to Place: Yes  Orientation to Time of Day: Yes  Orientation to Date: Yes     Situation (Do they understand why they are here?): Yes  Psychomotor Behavior: Normal  Thought Content: Suicidal  Thought Form: Intact     Medication  Psychotropic medications: Pt is taking Celexa 10 mg.  Medication Orders - Psychiatric (From admission, onward)      Start     Dose/Rate Route Frequency Ordered Stop    23 2200  QUEtiapine (SEROquel XR) 24 hr tablet 150 mg         150 mg Oral AT BEDTIME 23 1549               Current Care Team  Patient Care Team:  Ray Montesinos MD as PCP - General (Family Medicine)  Michaela Gutierrez, RN as Lead Care Coordinator (Primary Care - CC)  Collin Alejandro DO as Resident (Student in organized health care education/training program)  Marnie Arredondo MD as Assigned OBGYN Provider  Ray Montesinos MD as Assigned PCP  Rochelle Almonte as Psychiatrist  Christi Medina as Therapist    Diagnosis  Patient Active Problem List   Diagnosis Code    ADHD (attention deficit hyperactivity disorder) F90.9    Deliberate self-cutting Z72.89    MILES (generalized anxiety disorder) F41.1    Major depression in remission (H) F32.5    Menorrhagia N92.0    Oppositional defiant disorder F91.3    Parent/child conflict Z62.820    Risk of  labor O09.899    Encounter for supervision of high risk pregnancy in second trimester, antepartum O09.92    PROM (premature rupture of membranes) O42.90     (normal spontaneous vaginal  delivery) O80    Heartburn during pregnancy, antepartum O26.899, R12    Maternal iron deficiency anemia complicating pregnancy in third trimester O99.013, D50.9    Labor and delivery, indication for care O75.9    Passive suicidal ideations R45.851    Bipolar disorder, current episode mixed, moderate (H) F31.62    Lactating mother Z39.1       Primary Problem This Admission  Active Hospital Problems    Bipolar disorder, current episode mixed, moderate (H)      MILES (generalized anxiety disorder)        Clinical Summary and Substantiation of Recommendations   It is the recommendation of this writer that pt remain on observation status for continued monitoring of suicidal ideation. If pt further stabilizes in the ED, she would benefit from a referral for an IOP in addition to her outpatient therapy and psychiatry.       Patient coping skills attempted to reduce the crisis:  Pt has been accessing support from her family, particularly her  and parents.    Disposition  Recommended disposition: Observation, Individual Therapy, Medication Management        Reviewed case and recommendations with attending provider. Attending Name: Gertrude Taylor       Attending concurs with disposition: yes       Patient and/or validated legal guardian concurs with disposition:   yes       Final disposition:  observation    Legal status on admission: Voluntary/Patient has signed consent for treatment    Assessment Details   Total duration spent on the patient case in minutes: 30 min     CPT code(s) utilized: 62599 - Psychotherapy for Crisis - 60 (30-74*) min    FLAVIO Zuniga, Psychotherapist  [facility] - Triage & Transition Services  Callback: [phone]

## 2023-08-28 NOTE — PROGRESS NOTES
Diagnosed with Bipolar Disorder on 2020, she delivered a baby 9 months ago and in order to keep the baby safe she has stopped taking all her medications.  She is breastfeeding and also bottle feeding, patient brought her own pump, if  recommends to stop breastfeeding , in order to start medications she will agree with that.   Patient has been manic, not sleeping, not eating much, losing weight, and having impulsive thoughts. Her  has taken all credits cards,      Patient denies any suicidal ideation, she is here with a friend and would like to go to [facility].   She came to ED last week, but she left.  Denies any suicidal ideation.

## 2023-08-28 NOTE — PROGRESS NOTES
[facility]  ED to EMPATH Checklist:      Goal for EMPATH: Medication management and Bipolar    Current Behavior: Calm    Safety Concerns: None    Legal Hold Status: Voluntary    Medically Cleared by ED provider: Yes    Patient Therapeutically Searched: Therapeutic search by ED staff (strings, belts, shoes, pockets, electronics, etc.)    Belongings: Remain with patient    Independent Ambulation at Baseline: Yes    Participates in Care/Conversation: Yes    Patient Informed about EMPATH: Yes    DEC: Not ordered    Patient Ready to be Transferred to EMPATH? Yes

## 2023-08-28 NOTE — ED TRIAGE NOTES
Patient came to the ED Thursday last week after she was referred here for a manic episode, but left the ED because she was overwhelmed. Patient returns today with hopes of getting into the EMPATH unit for medication management to help with bipolar disorder.      Triage Assessment       Row Name 08/28/23 1102       Triage Assessment (Adult)    Airway WDL WDL       Respiratory WDL    Respiratory WDL WDL       Skin Circulation/Temperature WDL    Skin Circulation/Temperature WDL WDL       Cardiac WDL    Cardiac WDL WDL       Peripheral/Neurovascular WDL    Peripheral Neurovascular WDL WDL       Cognitive/Neuro/Behavioral WDL    Cognitive/Neuro/Behavioral WDL WDL

## 2023-08-28 NOTE — ED PROVIDER NOTES
[facility]  [facility] Emergency Department  Observation Initiation Date: Aug 28, 2023    Blaire Owusu MRN: [id number]   Age: 28 year old YOB: 1995     History     Chief Complaint   Patient presents with    Mental Health Problem     HPI  Blaire Owusu is a 28 year old female with a past history notable for bipolar disorder, posttraumatic stress disorder, self-injurious behaviors, substance use, and suicidal thoughts. She does have a history of attempts, the most recent one was 10 years ago. She has not engaged in self-injury in 2 years. She has not used substances in 3 years.    She presented to the ED this morning for admission to [facility] which was recommended to her by the psychiatrist she saw at [facility] on 8/24/23. She did present to the [facility] ED, but left without being seen because it did not appear that anyone knew that she was sent by [facility] and she was very uncomfortable in the waiting room. She said that she thought that everyone was looking at her and talking about her. She went home and her  and parents saw to it that she was never alone since she was expressing passive suicidal thoughts. She reports that she does not have a plan but has thoughts about not minding if she had a car accident.     With no improvement in her symptoms over the weekend, she came to the emergency department for psychiatric evaluation at the [facility] unit. She is nearing 5 hours in the ED.    She is 9 months post-partum and is breastfeeding. She would ideally like to continue to do so, and also get her emerging lula under control. She was tapered off Seroquel 300 mg, Depakote 1500 mg, and Celexa 10 mg over the course of her pregnancy with her 9 month old daughter. She was restarted by her community psychiatrist on Celexa but refused to add a bipolar agent. She then impulsively decided that she was going to self-increase her dose last week, and she  noticed that she has gotten worse since then, with poor sleep, decreased appetite, increased impulsivity, and reckless driving. She has a 3-4 week alternating cycle of depression and lula. Depression is characterized by vegetative symptoms, passive suicidal thoughts, and difficulty with ADLs. She reports having been diagnosed with bipolar disorder in , and with major depressive disorder, ADHD, and PTSD prior. She has been in various forms of treatment since she was 9 years old. ADHD has been treated with stimulants which she said led to significant mood lability.        Past Medical History  Past Medical History:   Diagnosis Date    Anxiety     Bipolar disorder in full remission, most recent episode unspecified type (H) 2022    Depression     Depressive disorder     Eating disorder Bulemia    Maternal iron deficiency anemia complicating pregnancy in third trimester 2022    Mutilations, self     Cutting     Past Surgical History:   Procedure Laterality Date    DENTAL SURGERY Bilateral 2012    [place] teeth removal, all 4     citalopram (CELEXA) 10 MG tablet  Prenatal Vit-Fe Fumarate-FA (PRENATAL MULTIVITAMIN W/IRON) 27-0.8 MG tablet      Allergies   Allergen Reactions    Lactose GI Disturbance     Family History  Family History   Problem Relation Age of Onset    Hypertension Father      Social History   Social History     Tobacco Use    Smoking status: Former     Packs/day: 0.10     Years: 1.00     Pack years: 0.10     Types: Cigarettes     Quit date: 2018     Years since quittin.7    Smokeless tobacco: Never   Substance Use Topics    Alcohol use: Yes     Comment: bottle of Vodka/month    Drug use: Yes     Types: Marijuana, Opiates     Comment: extasy      Past medical history, past surgical history, medications, allergies, family history, and social history were reviewed with the patient. No additional pertinent items.       Review of Systems  A medically appropriate review of systems was  "performed with pertinent positives and negatives noted in the HPI, and all other systems negative.    Physical Examination   BP: 129/67  Pulse: 98  Temp: 97.4  F (36.3  C)  Resp: 14  Height: 177.8 cm (5' 10\")  Weight: 62.1 kg (137 lb)  SpO2: 98 %    Physical Exam  General: Appears stated age.   Neuro: Alert and fully oriented. Extremities appear to demonstrate normal strength on visual inspection.   Integumentary/Skin: no rash visualized, normal color    Psychiatric Examination   Appearance: awake, alert, adequately groomed, and neatly groomed  Attitude:  cooperative  Eye Contact:  fair  Mood:  anxious  Affect:  appropriate and in normal range and mood congruent  Speech:  clear, coherent  Psychomotor Behavior:  no evidence of tardive dyskinesia, dystonia, or tics  Thought Process:  logical, linear, and goal oriented  Associations:  no loose associations  Thought Content:  passive suicidal ideation present  Insight:  fair  Judgement:  limited  Oriented to:  time, person, and place  Attention Span and Concentration:  fair  Recent and Remote Memory:  intact  Language: able to name/identify objects without impairment  Fund of Knowledge: intact with awareness of current and past events    ED Course        Labs Ordered and Resulted from Time of ED Arrival to Time of ED Departure - No data to display    Assessments & Plan (with Medical Decision Making)   Patient presenting with emerging lula most likely secondary to having self-increased citalopram. She started receiving psychiatric care at age 9 for ADHD, depression at age 13, and bipolar disorder at age 25. She had a period of stability when she was prescribed Seroquel, Depakote, and Celexa, although she did feel very tired and sedated on the combination. She was tapered off all of her medications because she became pregnant with her daughter who is now 9 months. She has experienced worsening passive suicidal ideation and has reached the point where she would like to " figure out what medications would be safe for her to take so that she can become psychiatrically stable and ideally continue to breastfeed. She denies any current thoughts of engaging in self-injurious behaviors. Nursing notes reviewed noting no acute issues.     I have reviewed the assessment completed by the [facility].     During the observation period, the patient did not require medications for agitation, and did not require restraints/seclusion for patient and/or provider safety.     The patient was found to have a psychiatric condition that would benefit from an observation stay in the emergency department for further psychiatric stabilization and/or coordination of a safe disposition. The observation plan includes serial assessments of psychiatric condition, potential administration of medications if indicated, further disposition pending the patient's psychiatric course during the monitoring period.     Preliminary diagnosis:    ICD-10-CM    1. Bipolar disorder, current episode mixed, moderate (H)  F31.62       2. Lactating mother  Z39.1       3. Passive suicidal ideations  R45.851            Treatment Plan:  - start Seroquel  mg at bedtime for treatment of initial and middle insomnia and emerging lula.  - she took her last dose of Celexa 10 mg this morning. Previously, she had increased her dose to 20 mg BID. Will discuss with Dr. Keith in the morning regarding continuing Celexa as she does appear to have mixed episodes and would need coverage for depression.  - observation status tonight, with reassessment in the morning to see how she is tolerating Seroquel and whether there is any improvement in her symptoms  - participate in the therapeutic milieu of the [facility] unit.      --  WESLY Perez CNP   [facility] EMERGENCY DEPT  Em[facility] Unit       Gertrude Taylor APRN CNP  08/28/23 0078

## 2023-08-28 NOTE — ED PROVIDER NOTES
"    History     Chief Complaint:  Mental Health Problem       HPI   Blaire Owusu is a 28 year old female with history of bipolar disorder, anxiety, and depression who presents with manic behavior and passive suicidal thoughts. She reports have worse manic episodes recently and increased her Citalopram dose which did not help. She was seen at [facility] by her psychiatrist who recommended she be seen in the ED. Here she notes having worse manic symptoms including flight of thought and impulsiveness including quitting her job and spending more money recently. She has also been day dreaming of terrible things happening to her though she has not active plan to harm herself. Endorses daily marijuana use and social alcohol use.       Independent Historian:    None     Review of External Notes:    I reviewed office visit from August 25    Medications:    Celexa     Past Medical History:    Anxiety  Bipolar disorder in full remission, most recent episode unspecified type (H)  Depression  Depressive disorder  Eating disorder  Maternal iron deficiency anemia complicating pregnancy in third trimester  Mutilations, self    Past Surgical History:    [place] tooth extraction      Physical Exam   Patient Vitals for the past 24 hrs:   BP Temp Temp src Pulse Resp SpO2 Height Weight   08/28/23 1107 129/67 97.4  F (36.3  C) Temporal 98 14 98 % 1.778 m (5' 10\") 62.1 kg (137 lb)        Physical Exam  General: Alert, interactive in mild distress  Head:  Scalp is atraumatic  Eyes:  The pupils are equal, round, and reactive to light    EOM's intact    No scleral icterus  ENT:      Nose:  The external nose is normal  Ears:  External ears are normal  Mouth/Throat: The oropharynx is normal    Mucus membranes are moist      Neck:  Normal range of motion.      There is no rigidity.    Trachea is in the midline         CV:  Regular rate and rhythm    No murmur   Resp:  Breath sounds are clear bilaterally    Non-labored, no " retractions or accessory muscle use     MS:  Normal strength in all 4 extremities  Skin:  Warm and dry, No rash or lesions noted.  Neuro:   Strength 5/5 x4.      GCS: 15  Psych: Appropriate interactions.  Forced speech. Anxious affect. passive suicidal ideation.    Emergency Department Course   Laboratory:  Labs Ordered and Resulted from Time of ED Arrival to Time of ED Departure - No data to display       Emergency Department Course & Assessments:    PSS-3      Date and Time Over the past 2 weeks have you felt down, depressed, or hopeless? Over the past 2 weeks have you had thoughts of killing yourself? Have you ever attempted to kill yourself? When did this last happen? User   08/28/23 1107 no yes no -- NESTOR          C-SSRS ([place])      Date and Time Q1 Wished to be Dead (Past Month) Q2 Suicidal Thoughts (Past Month) Q3 Suicidal Thought Method Q4 Suicidal Intent without Specific Plan Q5 Suicide Intent with Specific Plan Q6 Suicide Behavior (Lifetime) Within the Past 3 Months? RETIRED: Level of Risk per Screen Screening Not Complete User   08/28/23 1107 yes yes no yes no no -- -- -- NESTOR              Suicide assessment completed by mental health (D.E.C., LCSW, etc.)    Interventions:  Medications - No data to display     Assessments:  1204 I obtained history and examined the patient as noted above.     Independent Interpretation (X-rays, CTs, rhythm strip):  None    Consultations/Discussion of Management or Tests:  Patient will be seen by mental health professionals in our [facility] unit    Social Determinants of Health affecting care:  Stress/Adjustment Disorders     Disposition:  The patient was transferred to [facility].     Impression & Plan    Medical Decision Making:  Follow presentation history and physical examination were performed, the above work-up was undertaken.  Findings are consistent with likely hypomania possibly secondary to her breast-feeding versus adjustments in her citalopram.  Given her passive  suicidal ideation and escalating manic behavior including quitting her job I believe she would benefit from further evaluation by mental health professionals in our empath unit.  She is hemodynamically stable and I find her to be medically appropriate for the empath unit at this time.  Patient was in agreement this plan of action.  There is no signs of ingestion, toxidrome, or more concerning illness.    Diagnosis:    ICD-10-CM    1. Mental health-related complaint  Z71.1              Scribe Disclosure:  I, Prince Stewart, am serving as a scribe at 12:00 PM on 8/28/2023 to document services personally performed by Moi Lopez MD based on my observations and the provider's statements to me.  8/28/2023   Moi Lopez MD Trigger, Brandon Thomas, MD  08/28/23 7774

## 2023-08-28 NOTE — PROGRESS NOTES
28 year old female with history of bipolar disorder received from ED for lula.  Reports she has been manic for the last few weeks, noting insomnia, low appetite, weight loss, impulsive spending, and intrusive thoughts. Pt is 9 months postpartum and prior to pregnancy had been taking seroquel but stopped during pregnancy. Pt is currently breastfeeding but is willing to bottle feed so she can get restarted on medications for bipolar disorder. She has been taking celexa 10 mg daily. Pt reports daily marijuana use and occasional alcohol use (more frequently during lula). She denies any concern for alcohol withdrawal. Pt denies SI at this time but feels worried if she were to have intrusive suicidal thoughts she may act impulsively due to lula. Denies HI/AH/VH.    Nursing and risk assessments completed. Assessments reviewed with LMHP and physician. Admission information reviewed with patient. Patient given a tour of [facility] and instructions on using the facility. Questions regarding [facility] addressed. Pt safety search completed.

## 2023-08-29 VITALS
RESPIRATION RATE: 16 BRPM | DIASTOLIC BLOOD PRESSURE: 70 MMHG | HEART RATE: 69 BPM | OXYGEN SATURATION: 98 % | HEIGHT: 70 IN | WEIGHT: 137 LBS | TEMPERATURE: 99.2 F | BODY MASS INDEX: 19.61 KG/M2 | SYSTOLIC BLOOD PRESSURE: 106 MMHG

## 2023-08-29 LAB
AMPHETAMINES UR QL SCN: ABNORMAL
BARBITURATES UR QL SCN: ABNORMAL
BENZODIAZ UR QL SCN: ABNORMAL
BZE UR QL SCN: ABNORMAL
CANNABINOIDS UR QL SCN: ABNORMAL
HCG UR QL: NEGATIVE
OPIATES UR QL SCN: ABNORMAL
PCP QUAL URINE (ROCHE): ABNORMAL

## 2023-08-29 PROCEDURE — 81025 URINE PREGNANCY TEST: CPT | Performed by: EMERGENCY MEDICINE

## 2023-08-29 PROCEDURE — 80307 DRUG TEST PRSMV CHEM ANLYZR: CPT | Performed by: EMERGENCY MEDICINE

## 2023-08-29 PROCEDURE — 99284 EMERGENCY DEPT VISIT MOD MDM: CPT | Performed by: CLINICAL NURSE SPECIALIST

## 2023-08-29 RX ORDER — QUETIAPINE 150 MG/1
150 TABLET, FILM COATED, EXTENDED RELEASE ORAL AT BEDTIME
Qty: 30 TABLET | Refills: 0 | Status: SHIPPED | OUTPATIENT
Start: 2023-08-29

## 2023-08-29 ASSESSMENT — ACTIVITIES OF DAILY LIVING (ADL)
ADLS_ACUITY_SCORE: 35

## 2023-08-29 ASSESSMENT — COLUMBIA-SUICIDE SEVERITY RATING SCALE - C-SSRS
1. SINCE LAST CONTACT, HAVE YOU WISHED YOU WERE DEAD OR WISHED YOU COULD GO TO SLEEP AND NOT WAKE UP?: NO
ATTEMPT SINCE LAST CONTACT: NO
2. HAVE YOU ACTUALLY HAD ANY THOUGHTS OF KILLING YOURSELF?: NO
SUICIDE, SINCE LAST CONTACT: NO
6. HAVE YOU EVER DONE ANYTHING, STARTED TO DO ANYTHING, OR PREPARED TO DO ANYTHING TO END YOUR LIFE?: NO
TOTAL  NUMBER OF ABORTED OR SELF INTERRUPTED ATTEMPTS SINCE LAST CONTACT: NO
TOTAL  NUMBER OF INTERRUPTED ATTEMPTS SINCE LAST CONTACT: NO

## 2023-08-29 NOTE — PROGRESS NOTES
[facility] Progress Note    Client Name: Blaire Owusu  Date: August 29, 2023  Service Type:  Group Therapy  Facilitator:GEORGETTE Durbin, [facility]          Response:  Patient did not participate in group.      FLAVIO Avelar

## 2023-08-29 NOTE — PROGRESS NOTES
"Pt calm and cooperative. VSS. Full range affect, mood is described as \"good\". She states she slept well overnight. She notes that her goal coming to [facility] was to restart medications so she feels hopeful about starting seroquel again. She denies any intrusive thoughts and reports her main concern at this time is really missing her children who are currently staying with her father. Pt denies any SI. Pt would like to be set up with a new psychiatrist at time of discharge as she did not feel well supported by her previous one. Pt feels ready to discharge home today following reassessment by LMHP and provider.   "

## 2023-08-29 NOTE — PROGRESS NOTES
"Triage & Transition Services [facility]     Progress Note    Patient: Blaire goes by \"Blaire,\" uses she/her pronouns  Date of Service: August 29, 2023  Site of Service:   Patient was seen in-person.     Presenting problem:  Please see initial DEC/[facility] Crisis Assessment completed by FLAVIO Zuniga on yesterday for complete assessment information. Notable concerns include 9mos post partum, rapid cycling between lula and depression.     Individuals Present: Blaire & FLAVIO Avelar    Session start: 1330  Session end: 1350  Session duration in minutes: 20  CPT utilized: 01420 - Psychotherapy (with patient) - 30 (16-37*) min    Current Presentation:   Feels that she is doing much better and ready to go home.  Slept quite well with seroquel and hopeful this will continue to work for her.     Additional Collateral Information:  None at this time.     Risk Assessment:    [place] Suicide Severity Rating Scale Since Last Contact: 08/29/23    Suicidal Ideation (Since Last Contact)  1. Wish to be Dead (Since Last Contact): No  2. Non-Specific Active Suicidal Thoughts (Since Last Contact): No  Suicidal Behavior (Since Last Contact)  Actual Attempt (Since Last Contact): No  Has subject engaged in non-suicidal self-injurious behavior? (Since Last Contact): No  Interrupted Attempts (Since Last Contact): No  Aborted or Self-Interrupted Attempt (Since Last Contact): No  Preparatory Acts or Behavior (Since Last Contact): No  Suicide (Since Last Contact): No     C-SSRS Risk (Since Last Contact)  Calculated C-SSRS Risk Score (Since Last Contact): No Risk Indicated    Mental Status Exam     Affect: Appropriate   Appearance: Appropriate    Attention Span/Concentration: Attentive  Eye Contact: Engaged   Fund of Knowledge: Appropriate    Language /Speech Content: Fluent   Language /Speech Volume: Normal    Language /Speech Rate/Productions: Normal    Recent Memory: Intact   Remote Memory: Intact   Mood: Anxious  "   Orientation to Person: Yes    Orientation to Place: Yes   Orientation to Time of Day: Yes    Orientation to Date: Yes    Situation (Do they understand why they are here?): Yes    Psychomotor Behavior: Normal    Thought Content: Clear   Thought Form: Intact     Diagnosis:    Bipolar disorder, current episode mixed, moderate (H) F31.62       Therapeutic Intervention(s):   Provided active listening, unconditional positive regard, and validation. Engaged in safety planning.  Engaged in guided discovery, explored patient's perspectives and helped expand them through socratic dialogue. Coached on coping techniques/relaxation skills to help improve distress tolerance and managing intense emotions. Reviewed healthy living that supports positive mental health, including looking at sleep hygiene, regular movement, nutrition, and regular socialization. Provided positive reinforcement for progress towards goals, gains in knowledge, and application of skills previously taught.     Treatment Objective(s) Addressed:   The focus of this session was on rapport building, identifying and practicing coping strategies, safety planning, assessing safety, identifying treatment goals, and identifying additional supports.     Progress Towards Goals:   Patient reports improving symptoms. Patient is making progress towards treatment goals as evidenced by being able to sleep well, feeling much better, no longer experiencing intrusive SI.     Case Management:   Appointment scheduled for psychiatric provider and packet of information put together for IOP/ day treatment programs.  Also provided information for [facility] mother baby program.      Plan and Clinical Summary and Substantiation of Recommendations:   Discharge: Pt appears calm, appropriate, no longer experiencing intrusive SI, symptoms of lula or significant depression. She feels able to keep herself safe and wishes to return home. She has good support from friends and family and return  home with follow up with psychiatric providers and therapist appears to be appropriate at this time.     Attending concurs with disposition: Yes         FLAVIO Avelar
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
## 2023-08-29 NOTE — DISCHARGE INSTRUCTIONS
Aftercare Plan      Follow up with established providers and supports as scheduled. Continue taking medications as prescribed. Abstain from drugs and alcohol. Utilize your [facility] mental [facility] crisis team as needed. They are available . Contact information is listed below.     The following appointment was made on your behalf. If you need to make changes or cancel please contact the clinic/provider directly.     Date: 2023  Time: 2:00 pm - 3:00 pm  Provider: Vanita Ortiz, ROJELIO, CNP, PMLORAP, RN - has some speciality in  mental health. *See attached info  Location: [facility], [address] [place] [address], [place] [address]  Phone: [phone]  Type: Telepsychiatry    Patient Instructions  For all appointments: you will be sent information to fill out the intake paperwork online via text or email. Paperwork is required to hold the appointment, and must be completed at least 24 hours prior to your appointment. You will also be sent a link via email or text to attend the appointment remotely. You can contact the clinic at [phone], or visit the website at [email or web address]. Initial appointments are scheduled for 60 minutes, so allow for a full hour for the appointment.  -----------------------------------------------------------------    You also discussed the option of seeing a psychiatric provider through [facility] who specializes in  mental health.  If you wish to pursue this, you can call the number below:    [facility] Mother Baby Program: Multi-Generational Mental Health and Parenting Support for Families  The Mother-Baby Program offers a range of outpatient mental health and parenting support for individuals during pregnancy and parenting of children ages 0-5 years. Our staff will help you decide which of our programs best fits your needs - let us help you figure out the next step for your healing.  [address] [facility] [place] [address]  [phone]    If I am feeling unsafe or I am in a crisis, I will:   Contact my established care providers   Call the National Suicide Prevention Lifeline: 646.705.9189   Go to the nearest emergency room   Call 911     Warning signs that I or other people might notice when a crisis is developing for me: changes to sleep, appetite or mood, increased anger, agitation or irritability, feeling depressed or hopeless, spending more time alone or talking less, increased crying, decreased productivity, seeing or hearing things that aren't there, thoughts of not wanting to live anymore or of actually killing myself, thoughts of hurting others    Things I am able to do on my own to cope or help me feel better: watching a favorite tv show or movie, listening to music I enjoy, going outside and breathing fresh air, going for a walk or exercising, taking a shower or bath, a cold or hot beverage, a healthy snack, drawing/coloring/painting, journaling, singing or dancing, deep breathing     I can try practicing square breathing when I begin to feel anxious - inhale through the nose for the count of 4 and the first line on the square. Exhale through the mouth for the count of 4 for the second line of the square. Repeat to complete the square. Repeat the square as many times as needed.    I can also use my five senses to practice mindfulness and grounding. What are five things I can see, four things I can hear, three things I can feel, two things I can smell, and one thing I can taste.     Things that I am able to do with others to cope or help me feel better: sometimes just talking or spending time with someone else, sharing a meal or having coffee, watching a movie or playing a game, going for a walk or exercising    I can also use community resources including mental health hotlines, [facility] crisis teams, or apps.     Things I can use or do for distraction: movies/tv, music, reading, games,  "drawing/coloring/painting or other art, essential oils, exercise, cleaning/organizing, puzzles, crossword puzzles, word search, Sudoku       I can also download a meditation or relaxation adrienne, like Calm, Headspace, or Insight Timer (all three offer a free version)    Changes I can make to support my mental health and wellness: Attend scheduled mental health therapy and psychiatric appointments. Take my medications as prescribed. Maintain a daily schedule/routine. Abstain from all mood altering substances, including drugs, alcohol, or medications not currently prescribed to me. Implement a self-care routine.      People in my life that I can ask for help: friends or family, trusted teachers/staff/colleagues, trusted members of my community or place of [employer], mental health crisis lines, or 911    Your [facility] has a mental health crisis team you can call 24/7: [facility], [phone]    Other things that are important when I m in crisis: to remember that the feelings I am having right now are temporary, and it won't feel like this forever, and that it is okay and important to ask for help    Crisis Lines  Crisis Text Line  Text 872513  You will be connected with a trained live crisis counselor to provide support.    Por espanol, texto  MIKAEL a 718753 o texto a 442-AYUDAME en WhatsApp    National Hope Line  1.800.SUICIDE [7721897]      Community Resources  Fast Tracker  Linking people to mental health and substance use disorder resources  fasttrackermn.org     [place] Mental Health Warm Line  Peer to peer support  Monday thru Saturday, 12 pm to 10 pm  496.258.9787 or 4.299.757.1588  Text \"Support\" to 88273    National [place] [employer] Mental [employer]ness (NARDA)  665.087.1234 or 1.888.NARDA.HELPS      Mental Health Apps  My3  https://my3app.org/    VirtualHopeBox  https://CashStar.org/apps/virtual-hope-box/      Additional Information  Today you were seen by a licensed mental health professional " through Triage and Transition services, Behavioral Healthcare Providers ([facility])  for a crisis assessment in the Emergency Department at [facility].  It is recommended that you follow up with your established providers (psychiatrist, mental health therapist, and/or primary care doctor - as relevant) as soon as possible. Coordinators from [facility] will be calling you in the next 24-48 hours to ensure that you have the resources you need.  You can also contact [facility] coordinators directly at [phone]. You may have been scheduled for or offered an appointment with a mental health provider. [facility] maintains an extensive network of licensed behavioral health providers to connect patients with the services they need.  We do not charge providers a fee to participate in our referral network.  We match patients with providers based on a patient's specific needs, insurance coverage, and location.  Our first effort will be to refer you to a provider within your care system, and will utilize providers outside your care system as needed.

## 2023-08-29 NOTE — PROGRESS NOTES
Discharge instructions reviewed with patient including follow-up care plan. Educated on medication regimen and advised not to stop prescribed medication without consulting their physician. Reviewed safety plan and outpatient resources. Pt verbalized understanding of discharge instructions and all questions were answered. Patient's discharge medications were sent to home pharmacy. Pt's breast milk was returned to patient at discharge with ice pack and advised to place milk in refrigerator immediately upon returning home. Pt verbalized understanding. Denies SI. All belongings which were brought into the hospital have been returned to patient. Escorted off the unit at  1:23 PM  accompanied by LEANNA Cheatham.  Discharged to home in stable condition via private car driven by father.

## 2023-08-29 NOTE — PROGRESS NOTES
EmPATH Group Progress Note    Client Name: Blaire Owusu  Date: August 28, 2023  Service Type:  Group Therapy  Session Start Time:  1930  Session End Time: 2020  Session Length: 50  Attendees: Patient and other group members  Facilitator:me@ [employer]     Topic:   EmPATH goals, positive reframing    Intervention:    Group process: support, challenge, affirm, psycho-education.     Response:  Patient did participate in group. Behavior in group was to remain engaged with writer and other group member the entire session, answering all questions and offering support to peer. [facility]. She reports experiencing mental health sxs postpartum and is seeking greater supports, including changing outpt providers. Pt reports awareness of her sxs and need of level of care. She shared that she has been experiencing dissociative episodes and has been practicing grounding techniques and distraction and sxs persist. Writer offered information on impact of thoughts and ways to reframe and pt asked questions and reports she has experience with CBT, DBT and EMDR.       Andrei Miller

## 2023-08-29 NOTE — ED NOTES
Pt has spent time resting on the recliner watching tv. Pt lactating. She pumped and discarded the milk this shift. Pt denies any SI/HI/Renee. Pt denied any needs at the moment.

## 2023-08-30 ENCOUNTER — PATIENT OUTREACH (OUTPATIENT)
Dept: CARE COORDINATION | Facility: CLINIC | Age: 28
End: 2023-08-30
Payer: COMMERCIAL

## 2023-08-30 NOTE — PROGRESS NOTES
Clinical Product Navigator RN reviewed chart; patient on payer product coverage.  Review results:   CPN Initial Information Gathering  Referral Source: IP Report  Referrals Places: Care Coordination    Patient identified via Epic report as recent ED EmPATH Unit for bipolar disorder and passive suicidal ideation. Per chart review, patient was on EmPATH Unit for observation. Patient follows with Dr. Montesinos at [facility], as well as Psychiatry through [facility]. Primary Care CC referral placed to assess for any additional support or resources needs.    Marnie Son RN CC  Casual Clinical Product Navigator Coverage

## 2023-09-07 ENCOUNTER — TELEPHONE (OUTPATIENT)
Dept: FAMILY MEDICINE | Facility: CLINIC | Age: 28
End: 2023-09-07
Payer: COMMERCIAL

## 2023-09-07 NOTE — TELEPHONE ENCOUNTER
"Care Coordinator received message from Marnie Son RN.     \"Patient identified via Epic report as recent ED EmPATH Unit for bipolar disorder and passive suicidal ideation. Per chart review, patient was on EmPATH Unit for observation. Patient follows with Dr. Montesinos at [facility], as well as Psychiatry through [facility]. Primary Care CC referral placed to assess for any additional support or resources needs\".       Care Coordinator attempted to reach patient to discuss any additional support or resource needs, CC has to leave message on patient voicemail.       Victoria Garces  Care Coordinator  [facility]  [phone]    "

## 2023-09-12 NOTE — TELEPHONE ENCOUNTER
9/12/23 Care Coordinator again attempted to reach out to patient to discuss additional support and/or resource needs. Care Coordinator again left message on patient voicemail. CC sending letter. No more to be done with this request.      Victoria Garces  Care Coordinator  [facility]  [phone]

## 2024-07-06 ENCOUNTER — HEALTH MAINTENANCE LETTER (OUTPATIENT)
Age: 29
End: 2024-07-06

## 2025-07-13 ENCOUNTER — HEALTH MAINTENANCE LETTER (OUTPATIENT)
Age: 30
End: 2025-07-13